# Patient Record
Sex: MALE | Race: BLACK OR AFRICAN AMERICAN | NOT HISPANIC OR LATINO
[De-identification: names, ages, dates, MRNs, and addresses within clinical notes are randomized per-mention and may not be internally consistent; named-entity substitution may affect disease eponyms.]

---

## 2018-07-12 ENCOUNTER — APPOINTMENT (OUTPATIENT)
Dept: NEPHROLOGY | Facility: CLINIC | Age: 83
End: 2018-07-12
Payer: MEDICARE

## 2018-07-12 VITALS
HEART RATE: 85 BPM | BODY MASS INDEX: 25.16 KG/M2 | DIASTOLIC BLOOD PRESSURE: 70 MMHG | HEIGHT: 68 IN | SYSTOLIC BLOOD PRESSURE: 138 MMHG | WEIGHT: 166 LBS | TEMPERATURE: 97.6 F | RESPIRATION RATE: 15 BRPM | OXYGEN SATURATION: 98 %

## 2018-07-12 DIAGNOSIS — Z86.79 PERSONAL HISTORY OF OTHER DISEASES OF THE CIRCULATORY SYSTEM: ICD-10-CM

## 2018-07-12 PROCEDURE — 99204 OFFICE O/P NEW MOD 45 MIN: CPT | Mod: GC

## 2018-07-12 RX ORDER — PROMETHAZINE HYDROCHLORIDE AND DEXTROMETHORPHAN HYDROBROMIDE ORAL SOLUTION 15; 6.25 MG/5ML; MG/5ML
6.25-15 SOLUTION ORAL
Qty: 118 | Refills: 0 | Status: DISCONTINUED | COMMUNITY
Start: 2018-06-09

## 2018-07-12 RX ORDER — PNV NO.95/FERROUS FUM/FOLIC AC 28MG-0.8MG
100 TABLET ORAL DAILY
Refills: 0 | Status: ACTIVE | COMMUNITY
Start: 2018-07-12

## 2018-07-19 ENCOUNTER — FORM ENCOUNTER (OUTPATIENT)
Age: 83
End: 2018-07-19

## 2018-07-19 ENCOUNTER — RX RENEWAL (OUTPATIENT)
Age: 83
End: 2018-07-19

## 2018-07-19 ENCOUNTER — RESULT REVIEW (OUTPATIENT)
Age: 83
End: 2018-07-19

## 2018-07-20 ENCOUNTER — OUTPATIENT (OUTPATIENT)
Dept: OUTPATIENT SERVICES | Facility: HOSPITAL | Age: 83
LOS: 1 days | End: 2018-07-20
Payer: MEDICARE

## 2018-07-20 ENCOUNTER — APPOINTMENT (OUTPATIENT)
Dept: ULTRASOUND IMAGING | Facility: IMAGING CENTER | Age: 83
End: 2018-07-20
Payer: MEDICARE

## 2018-07-20 DIAGNOSIS — Z00.8 ENCOUNTER FOR OTHER GENERAL EXAMINATION: ICD-10-CM

## 2018-07-20 LAB
ALBUMIN SERPL ELPH-MCNC: 3.6 G/DL
ANION GAP SERPL CALC-SCNC: 17 MMOL/L
APPEARANCE: CLEAR
BACTERIA: NEGATIVE
BILIRUBIN URINE: NEGATIVE
BLOOD URINE: ABNORMAL
BUN SERPL-MCNC: 21 MG/DL
CALCIUM SERPL-MCNC: 9.8 MG/DL
CHLORIDE SERPL-SCNC: 104 MMOL/L
CO2 SERPL-SCNC: 24 MMOL/L
COLOR: YELLOW
CREAT SERPL-MCNC: 1.43 MG/DL
CREAT SPEC-SCNC: 155 MG/DL
CREAT/PROT UR: 4.3 RATIO
GLUCOSE QUALITATIVE U: NEGATIVE MG/DL
GLUCOSE SERPL-MCNC: 153 MG/DL
HBV SURFACE AG SER QL: NONREACTIVE
HCV AB SER QL: NONREACTIVE
HCV S/CO RATIO: 0.07 S/CO
HYALINE CASTS: 0 /LPF
KETONES URINE: NEGATIVE
LEUKOCYTE ESTERASE URINE: NEGATIVE
M PROTEIN SPEC IFE-MCNC: NORMAL
MICROSCOPIC-UA: NORMAL
NITRITE URINE: NEGATIVE
PH URINE: 5.5
PHOSPHATE SERPL-MCNC: 3.2 MG/DL
PHOSPHOLIPASE A2 RECEPTOR ELISA: <2 RU/ML
PHOSPHOLIPASE A2 RECEPTOR IFA: NEGATIVE
POTASSIUM SERPL-SCNC: 4.7 MMOL/L
PROT UR-MCNC: 670 MG/DL
PROTEIN URINE: >300 MG/DL
RED BLOOD CELLS URINE: 15 /HPF
SODIUM SERPL-SCNC: 145 MMOL/L
SPECIFIC GRAVITY URINE: 1.02
SQUAMOUS EPITHELIAL CELLS: 2 /HPF
UROBILINOGEN URINE: NEGATIVE MG/DL
WHITE BLOOD CELLS URINE: 6 /HPF

## 2018-07-20 PROCEDURE — 93975 VASCULAR STUDY: CPT | Mod: 26

## 2018-07-20 PROCEDURE — 93975 VASCULAR STUDY: CPT

## 2019-04-18 ENCOUNTER — APPOINTMENT (OUTPATIENT)
Dept: NEPHROLOGY | Facility: CLINIC | Age: 84
End: 2019-04-18
Payer: MEDICARE

## 2019-04-18 VITALS
HEIGHT: 68 IN | WEIGHT: 165 LBS | TEMPERATURE: 97.9 F | HEART RATE: 89 BPM | SYSTOLIC BLOOD PRESSURE: 170 MMHG | DIASTOLIC BLOOD PRESSURE: 72 MMHG | RESPIRATION RATE: 16 BRPM | BODY MASS INDEX: 25.01 KG/M2

## 2019-04-18 VITALS — SYSTOLIC BLOOD PRESSURE: 160 MMHG | DIASTOLIC BLOOD PRESSURE: 75 MMHG

## 2019-04-18 PROCEDURE — 99214 OFFICE O/P EST MOD 30 MIN: CPT | Mod: GC

## 2019-04-18 RX ORDER — SIMVASTATIN 20 MG/1
20 TABLET, FILM COATED ORAL
Qty: 90 | Refills: 0 | Status: ACTIVE | COMMUNITY
Start: 2019-04-18

## 2019-04-18 NOTE — HISTORY OF PRESENT ILLNESS
[FreeTextEntry1] : 84-year-old male with long standing history of DM, HTN, CKD and proteinuria has come for follow-up visit after ~9 months. Pt. was seen for initial visit for CKD/proteinuria on 7/12/18. During initial visit on 7/12/18, pt found to have an elevated but stable Scr of 1.43 and elevated spot urine TP/CR of 4.3. Pt. also found to have IgA kappa band on serum CARLY. Pt. was subsequently advised to undergo hematology/oncology evaluation at [facility] however has not seen a hematologist/oncologist as advised.  \par \par Currently, pt. feels well but gives history of bilateral LE edema. Pt. denies SOB, CP, abdominal pain, nausea, vomiting or headache.

## 2019-04-18 NOTE — REVIEW OF SYSTEMS
[Lower Ext Edema] : lower extremity edema [As noted in HPI] : as noted in HPI [As Noted in HPI] : as noted in HPI [Limb Swelling] : limb swelling [Fever] : no fever [Chills] : no chills [Feeling Tired] : not feeling tired [Eyesight Problems] : no eyesight problems [Sore Throat] : no sore throat [Chest Pain] : no chest pain [Shortness Of Breath] : no shortness of breath [Vomiting] : no vomiting [Dysuria] : no dysuria [Joint Swelling] : no joint swelling [Limb Pain] : no limb pain [Skin Lesions] : no skin lesions [Confused] : no confusion [Dizziness] : no dizziness [Anxiety] : no anxiety [Depression] : no depression [Easy Bleeding] : no tendency for easy bleeding [Easy Bruising] : no tendency for easy bruising

## 2019-04-18 NOTE — PHYSICAL EXAM
[General Appearance - Alert] : alert [Outer Ear] : the ears and nose were normal in appearance [Sclera] : the sclera and conjunctiva were normal [Auscultation Breath Sounds / Voice Sounds] : lungs were clear to auscultation bilaterally [Jugular Venous Distention Increased] : there was no jugular-venous distention [Heart Sounds] : normal S1 and S2 [Abdomen Soft] : soft [No CVA Tenderness] : no ~M costovertebral angle tenderness [Skin Color & Pigmentation] : normal skin color and pigmentation [Abnormal Walk] : normal gait [Affect] : the affect was normal [Oriented To Time, Place, And Person] : oriented to person, place, and time [Mood] : the mood was normal [FreeTextEntry1] : Bilateral LE pitting edema present

## 2019-04-18 NOTE — ASSESSMENT
[FreeTextEntry1] : 1. CKD stage 3/Proteinuria: Pt. with CKD in setting of longstanding DM and HTN. On review of previous labs in [facility], Scr was 1.10 on 10/13/15 and 1.01 on 3/3/16. Spot urine microalbumin/creatinine was 72 in 10/13/15 which increased to 235 on 3/3/16. Scr increased to 1.58 on labs done in PMD's office on 5/2/18. Spot urine microalbumin/creatinine ratio was also elevated at 1909 on 5/2/18. Serum albumin was low at 3.4 on 5/2/18. Scr sent during initial office visit on 7/12/18 was elevated but stable at 1.43 and spot urine TP/CR was significantly elevated at 4.3. Serum albumin was WNL at 3.6 on 7/12/19. Serological workup including HBsAg, Hep C ab, and anti-PLA2R Ab were non-reactive/negative. Pt. however found to have IgA kappa band on serum CARLY. Pt. was subsequently advised to undergo hematology/oncology evaluation at [facility] however has not seen a hematologist/oncologist as advised. Renal ultrasound on 7/20/18 without evidence of renal masses, hydronephrosis, calculi, or renal artery stenosis. Check renal panel, spot urine TP/CR, CBC, and serum immunofixation today. Pt. advised to undergo hematology/oncology evaluation at [facility] soon. Phone number and address for hematology/oncology office at [facility]  provided to patient. Patient is currently on ACE-I. Importance of good glycemic and BP control discussed with patient. Patient advised to avoid NSAIDs, RCAs, and other nephrotoxins. Pt. currently on metformin. Will need to discontinue Metformin if eGFR decreases further. \par \par 2. HTN: BP elevated during office visit today. Advised patient to take torsemide 10 mg daily. Low salt diet advised. Monitor BP.\par \par 3. LE edema: Patient noted to have bilateral LE edema on examination today. Advised to start torsemide as above. Low salt diet advised. Monitor body weight. \par \par RTC: 1 month

## 2019-04-24 ENCOUNTER — APPOINTMENT (OUTPATIENT)
Dept: NEPHROLOGY | Facility: CLINIC | Age: 84
End: 2019-04-24
Payer: MEDICARE

## 2019-04-24 PROCEDURE — 36415 COLL VENOUS BLD VENIPUNCTURE: CPT

## 2019-04-25 ENCOUNTER — RESULT REVIEW (OUTPATIENT)
Age: 84
End: 2019-04-25

## 2019-04-25 LAB
ALBUMIN SERPL ELPH-MCNC: 3.5 G/DL
ANION GAP SERPL CALC-SCNC: 12 MMOL/L
BASOPHILS # BLD AUTO: 0.02 K/UL
BASOPHILS NFR BLD AUTO: 0.4 %
BUN SERPL-MCNC: 39 MG/DL
CALCIUM SERPL-MCNC: 9.4 MG/DL
CHLORIDE SERPL-SCNC: 106 MMOL/L
CO2 SERPL-SCNC: 25 MMOL/L
CREAT SERPL-MCNC: 2.68 MG/DL
CREAT SPEC-SCNC: 145 MG/DL
CREAT/PROT UR: 4 RATIO
EOSINOPHIL # BLD AUTO: 0.28 K/UL
EOSINOPHIL NFR BLD AUTO: 5.2 %
GLUCOSE SERPL-MCNC: 110 MG/DL
HCT VFR BLD CALC: 33.6 %
HGB BLD-MCNC: 10.3 G/DL
IMM GRANULOCYTES NFR BLD AUTO: 0.2 %
LYMPHOCYTES # BLD AUTO: 1.68 K/UL
LYMPHOCYTES NFR BLD AUTO: 31.5 %
M PROTEIN SPEC IFE-MCNC: NORMAL
MAN DIFF?: NORMAL
MCHC RBC-ENTMCNC: 27.5 PG
MCHC RBC-ENTMCNC: 30.7 GM/DL
MCV RBC AUTO: 89.8 FL
MONOCYTES # BLD AUTO: 0.47 K/UL
MONOCYTES NFR BLD AUTO: 8.8 %
NEUTROPHILS # BLD AUTO: 2.88 K/UL
NEUTROPHILS NFR BLD AUTO: 53.9 %
PHOSPHATE SERPL-MCNC: 4 MG/DL
PLATELET # BLD AUTO: 264 K/UL
POTASSIUM SERPL-SCNC: 5.7 MMOL/L
PROT UR-MCNC: 574 MG/DL
RBC # BLD: 3.74 M/UL
RBC # FLD: 13.5 %
SODIUM SERPL-SCNC: 143 MMOL/L
WBC # FLD AUTO: 5.34 K/UL

## 2019-04-26 LAB
ALBUMIN SERPL ELPH-MCNC: 3.6 G/DL
ANION GAP SERPL CALC-SCNC: 12 MMOL/L
BUN SERPL-MCNC: 42 MG/DL
CALCIUM SERPL-MCNC: 9.8 MG/DL
CHLORIDE SERPL-SCNC: 100 MMOL/L
CO2 SERPL-SCNC: 27 MMOL/L
CREAT SERPL-MCNC: 2.6 MG/DL
GLUCOSE SERPL-MCNC: 137 MG/DL
PHOSPHATE SERPL-MCNC: 4.9 MG/DL
POTASSIUM SERPL-SCNC: 5.1 MMOL/L
SODIUM SERPL-SCNC: 139 MMOL/L

## 2019-05-08 ENCOUNTER — OUTPATIENT (OUTPATIENT)
Dept: OUTPATIENT SERVICES | Facility: HOSPITAL | Age: 84
LOS: 1 days | Discharge: HOME | End: 2019-05-08

## 2019-05-08 DIAGNOSIS — D64.9 ANEMIA, UNSPECIFIED: ICD-10-CM

## 2019-05-09 ENCOUNTER — APPOINTMENT (OUTPATIENT)
Dept: HEMATOLOGY ONCOLOGY | Facility: CLINIC | Age: 84
End: 2019-05-09
Payer: MEDICARE

## 2019-05-09 VITALS
RESPIRATION RATE: 16 BRPM | SYSTOLIC BLOOD PRESSURE: 172 MMHG | TEMPERATURE: 97.6 F | HEIGHT: 67.8 IN | BODY MASS INDEX: 24.27 KG/M2 | OXYGEN SATURATION: 99 % | HEART RATE: 90 BPM | WEIGHT: 158.29 LBS | DIASTOLIC BLOOD PRESSURE: 76 MMHG

## 2019-05-09 DIAGNOSIS — Z78.9 OTHER SPECIFIED HEALTH STATUS: ICD-10-CM

## 2019-05-09 PROCEDURE — 99205 OFFICE O/P NEW HI 60 MIN: CPT

## 2019-05-09 RX ORDER — SITAGLIPTIN 100 MG/1
100 TABLET, FILM COATED ORAL DAILY
Qty: 30 | Refills: 0 | Status: ACTIVE | COMMUNITY
Start: 2019-05-09

## 2019-05-09 RX ORDER — METFORMIN HYDROCHLORIDE 1000 MG/1
1000 TABLET, EXTENDED RELEASE ORAL
Qty: 60 | Refills: 0 | Status: DISCONTINUED | COMMUNITY
Start: 2017-12-01 | End: 2019-05-09

## 2019-05-09 NOTE — PHYSICAL EXAM
[Fully active, able to carry on all pre-disease performance without restriction] : Status 0 - Fully active, able to carry on all pre-disease performance without restriction [Normal] : affect appropriate [de-identified] : upper and lower dentures; no oral lesions

## 2019-05-09 NOTE — HISTORY OF PRESENT ILLNESS
[Constitutional] : Constitutional [Cardiovascular] : Cardiovascular [Dermatologic] : Dermatologic [ENT] : ENT [Endocrine] : Endocrine [Gastrointestinal] : Gastrointestinal [Genitourinary] : Genitourinary [Gynecologic] : Gynecologic [Infectious] : Infectious [Musculoskeletal] : Musculoskeletal [Pain] : Pain [Neurologic] : Neurologic [Hematologic] : Hematologic [Pulmonary] : Pulmonary [Date: ____________] : Patient's last distress assessment performed on [unfilled]. [0 - No Distress] : Distress Level: 0 [de-identified] : Kris Ponce is a 84 year old male presenting to the office for hematologic evaluation. He is referred here by Dr. Jorje Abreu (nephrologist).\par The patient has a history of hypertension for 25 years. He has been taking medication for control og blood pressure. There is no history of myocardial infarction stroke or peripheral vascular disease\par The patient has a history of diabetes for 25 years. He has been treated for diabetes with metformin.\par The patietn was noted to have IgA kappa light chain elevation in 2018\par  Patient completed lab studies completed between 4/18/2019 - 4/25/2019 and the following results were noted: Creatinine 1.43, elevated spot urine T P/CR 4.3, IgA kappa band on serum I F E.\par Past medical history includes DM, HTN, CKD, proteinuria, bilateral lower extremity edema. \par \par  [FreeTextEntry1] : initial evaluation [de-identified] : Currently, pt. feels well but gives history of bilateral LE edema. Pt. denies SOB, CP, abdominal pain, nausea, vomiting or headache.  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 The patient feels entirely well. He has occasional joint pains. there is no history of spontaneous fracture

## 2019-05-09 NOTE — CONSULT LETTER
[Consult Letter:] : I had the pleasure of evaluating your patient, [unfilled]. [Dear  ___] : Dear  [unfilled], [Sincerely,] : Sincerely, [FreeTextEntry2] : Ventura Abreu MD\par Nephology\par [address] [place] [address]\par [facility] [id number] [FreeTextEntry3] : Tom Palencia

## 2019-05-09 NOTE — REVIEW OF SYSTEMS
[Skin Rash] : skin rash [Negative] : Allergic/Immunologic [Joint Stiffness] : no joint stiffness [Joint Pain] : no joint pain [FreeTextEntry2] : 8 lbs [Muscle Pain] : no muscle pain [de-identified] : tinea

## 2019-05-09 NOTE — ASSESSMENT
[Supportive] : Goals of care discussed with patient: Supportive [Palliative Care Plan] : not applicable at this time [FreeTextEntry1] : Mr cathy Ponce is a well appearing 84 year old male who continues to work (as a ); he is interactive and he has not required dialysis despite moderate renal insufficiency. 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 It is not likely to be contributory to his mild renal insufficiency as he has prior diabetes and hypertension. The mono clonal gammopathy has been stable; it is of the Ig A variety and I would advise continued characterization and monitoring. There is no history of skeletal events.\par I shall request serum immunoelectrophoresis, CMP, free serum light chains, and measurement of 24 hour light chain production. THe clinical findings are not compatible with light chain disease or multiple myeloma. Urinary and serum levels to be obtained on May 13; requisition mailed to patient. RTC 2 weeks. Patient seen with SAI CLANCY

## 2019-05-13 ENCOUNTER — RESULT REVIEW (OUTPATIENT)
Age: 84
End: 2019-05-13

## 2019-05-13 ENCOUNTER — APPOINTMENT (OUTPATIENT)
Dept: HEMATOLOGY ONCOLOGY | Facility: CLINIC | Age: 84
End: 2019-05-13

## 2019-05-13 LAB
ALBUMIN SERPL ELPH-MCNC: 3.7 G/DL
ALP BLD-CCNC: 95 U/L
ALT SERPL-CCNC: 12 U/L
ANION GAP SERPL CALC-SCNC: 13 MMOL/L
AST SERPL-CCNC: 19 U/L
BASOPHILS # BLD AUTO: 0 K/UL — SIGNIFICANT CHANGE UP (ref 0–0.2)
BASOPHILS NFR BLD AUTO: 0.4 % — SIGNIFICANT CHANGE UP (ref 0–2)
BILIRUB SERPL-MCNC: 0.2 MG/DL
BUN SERPL-MCNC: 31 MG/DL
CALCIUM SERPL-MCNC: 9.4 MG/DL
CHLORIDE SERPL-SCNC: 105 MMOL/L
CO2 SERPL-SCNC: 24 MMOL/L
CREAT SERPL-MCNC: 2.35 MG/DL
EOSINOPHIL # BLD AUTO: 0.2 K/UL — SIGNIFICANT CHANGE UP (ref 0–0.5)
EOSINOPHIL NFR BLD AUTO: 3.7 % — SIGNIFICANT CHANGE UP (ref 0–6)
GLUCOSE SERPL-MCNC: 156 MG/DL
HCT VFR BLD CALC: 32.7 % — LOW (ref 39–50)
HGB BLD-MCNC: 11.5 G/DL — LOW (ref 13–17)
LYMPHOCYTES # BLD AUTO: 2 K/UL — SIGNIFICANT CHANGE UP (ref 1–3.3)
LYMPHOCYTES # BLD AUTO: 37.9 % — SIGNIFICANT CHANGE UP (ref 13–44)
MCHC RBC-ENTMCNC: 30.1 PG — SIGNIFICANT CHANGE UP (ref 27–34)
MCHC RBC-ENTMCNC: 35.1 G/DL — SIGNIFICANT CHANGE UP (ref 32–36)
MCV RBC AUTO: 85.7 FL — SIGNIFICANT CHANGE UP (ref 80–100)
MONOCYTES # BLD AUTO: 0.4 K/UL — SIGNIFICANT CHANGE UP (ref 0–0.9)
MONOCYTES NFR BLD AUTO: 8 % — SIGNIFICANT CHANGE UP (ref 2–14)
NEUTROPHILS # BLD AUTO: 2.7 K/UL — SIGNIFICANT CHANGE UP (ref 1.8–7.4)
NEUTROPHILS NFR BLD AUTO: 50 % — SIGNIFICANT CHANGE UP (ref 43–77)
PLATELET # BLD AUTO: 245 K/UL — SIGNIFICANT CHANGE UP (ref 150–400)
POTASSIUM SERPL-SCNC: 4.8 MMOL/L
PROT SERPL-MCNC: 6.8 G/DL
RBC # BLD: 3.81 M/UL — LOW (ref 4.2–5.8)
RBC # FLD: 12.3 % — SIGNIFICANT CHANGE UP (ref 10.3–14.5)
SODIUM SERPL-SCNC: 142 MMOL/L
WBC # BLD: 5.4 K/UL — SIGNIFICANT CHANGE UP (ref 3.8–10.5)
WBC # FLD AUTO: 5.4 K/UL — SIGNIFICANT CHANGE UP (ref 3.8–10.5)

## 2019-05-14 LAB
DEPRECATED KAPPA LC FREE/LAMBDA SER: 2.3 RATIO
KAPPA LC CSF-MCNC: 3.41 MG/DL
KAPPA LC SERPL-MCNC: 7.84 MG/DL

## 2019-05-15 LAB
ALBUMIN MFR SERPL ELPH: 49.9 %
ALBUMIN SERPL-MCNC: 3.4 G/DL
ALBUMIN/GLOB SERPL: 1 RATIO
ALPHA1 GLOB MFR SERPL ELPH: 4.8 %
ALPHA1 GLOB SERPL ELPH-MCNC: 0.3 G/DL
ALPHA2 GLOB MFR SERPL ELPH: 17.1 %
ALPHA2 GLOB SERPL ELPH-MCNC: 1.2 G/DL
B-GLOBULIN MFR SERPL ELPH: 20.1 %
B-GLOBULIN SERPL ELPH-MCNC: 1.4 G/DL
DEPRECATED KAPPA LC FREE/LAMBDA SER: 2.3 RATIO
GAMMA GLOB FLD ELPH-MCNC: 0.6 G/DL
GAMMA GLOB MFR SERPL ELPH: 8.1 %
IGA SER QL IEP: 701 MG/DL
IGG SER QL IEP: 534 MG/DL
IGM SER QL IEP: 20 MG/DL
INTERPRETATION SERPL IEP-IMP: NORMAL
KAPPA LC CSF-MCNC: 3.41 MG/DL
KAPPA LC SERPL-MCNC: 7.84 MG/DL
M PROTEIN MFR SERPL ELPH: 4.8 %
M PROTEIN SPEC IFE-MCNC: NORMAL
MONOCLON BAND OBS SERPL: 0.3 G/DL
PROT SERPL-MCNC: 6.8 G/DL
PROT SERPL-MCNC: 6.8 G/DL

## 2019-05-16 ENCOUNTER — APPOINTMENT (OUTPATIENT)
Dept: NEPHROLOGY | Facility: CLINIC | Age: 84
End: 2019-05-16
Payer: MEDICARE

## 2019-05-16 VITALS — SYSTOLIC BLOOD PRESSURE: 160 MMHG | DIASTOLIC BLOOD PRESSURE: 80 MMHG

## 2019-05-16 VITALS
DIASTOLIC BLOOD PRESSURE: 72 MMHG | TEMPERATURE: 97.7 F | BODY MASS INDEX: 24.7 KG/M2 | OXYGEN SATURATION: 97 % | SYSTOLIC BLOOD PRESSURE: 163 MMHG | HEART RATE: 90 BPM | RESPIRATION RATE: 15 BRPM | WEIGHT: 161.5 LBS

## 2019-05-16 DIAGNOSIS — R80.9 PROTEINURIA, UNSPECIFIED: ICD-10-CM

## 2019-05-16 LAB
KAPPA LC 24H UR-MCNC: 15.9 MG/DL
KAPPA LC 24H UR-MRATE: 190.8 MG/24 H
LAMBDA LC 24H UR-MCNC: 5.16 MG/DL
LAMBDA LC 24H UR-MRATE: 61.92 MG/24 H
SPECIMEN VOL 24H UR: 1200 ML/24 H

## 2019-05-16 PROCEDURE — 99214 OFFICE O/P EST MOD 30 MIN: CPT

## 2019-05-16 NOTE — ASSESSMENT
[FreeTextEntry1] : 1. CKD stage 3/Proteinuria: Pt. with CKD and proteinuria in setting of longstanding DM and HTN. Serological workup (sent in the past) including HBsAg, Hep C ab, and anti-PLA2R Ab were non-reactive/negative. Pt. however found to have IgA kappa band on serum CARLY. Pt. saw hematologist/oncologist Dr. Palencia at [facility] on 5/9/19 for evaluation. Dr. Conroy's note reviewed. Pt. has a follow-up appointment with Dr. Palencia on 5/22/19. Renal ultrasound on 7/20/18 without evidence of renal masses, hydronephrosis, calculi, or renal artery stenosis. Recent Scr elevated/stable at 2.35 on 5/13/19. Patient is currently on ACE-I. Importance of good glycemic and BP control discussed with patient. Patient advised to avoid NSAIDs, RCAs, and other nephrotoxins. Monitor renal function.\par \par 2. HTN: Pt. with elevated BP readings during office visit today. Increased torsemide to 20 mg daily for LE edema and HTN management. Low salt diet advised. Monitor BP.\par \par 3. LE edema: Patient with bilateral LE edema on examination today. Dose of torsemide to be increased (see above). Low salt diet advised. Monitor body weight. \par \par RTC: 1 month

## 2019-05-16 NOTE — PHYSICAL EXAM
[Sclera] : the sclera and conjunctiva were normal [General Appearance - Alert] : alert [Outer Ear] : the ears and nose were normal in appearance [Jugular Venous Distention Increased] : there was no jugular-venous distention [Auscultation Breath Sounds / Voice Sounds] : lungs were clear to auscultation bilaterally [Heart Sounds] : normal S1 and S2 [Abdomen Soft] : soft [No CVA Tenderness] : no ~M costovertebral angle tenderness [Abnormal Walk] : normal gait [Skin Color & Pigmentation] : normal skin color and pigmentation [Affect] : the affect was normal [Oriented To Time, Place, And Person] : oriented to person, place, and time [Mood] : the mood was normal [FreeTextEntry1] : Bilateral LE pitting edema present

## 2019-05-16 NOTE — REVIEW OF SYSTEMS
[As noted in HPI] : as noted in HPI [Lower Ext Edema] : lower extremity edema [As Noted in HPI] : as noted in HPI [Limb Swelling] : limb swelling [Fever] : no fever [Chills] : no chills [Eyesight Problems] : no eyesight problems [Feeling Tired] : not feeling tired [Sore Throat] : no sore throat [Chest Pain] : no chest pain [Vomiting] : no vomiting [Shortness Of Breath] : no shortness of breath [Dysuria] : no dysuria [Joint Swelling] : no joint swelling [Limb Pain] : no limb pain [Skin Lesions] : no skin lesions [Confused] : no confusion [Dizziness] : no dizziness [Anxiety] : no anxiety [Depression] : no depression [Easy Bleeding] : no tendency for easy bleeding [Easy Bruising] : no tendency for easy bruising

## 2019-05-16 NOTE — HISTORY OF PRESENT ILLNESS
[FreeTextEntry1] : 84-year-old male with long standing history of DM, HTN, CKD and proteinuria has come for follow-up visit. Pt. was last seen in clinic on 4/18/19. Pt. saw hematologist/oncologist Dr. Palencia at [facility] on 5/9/19 for evaluation of IgA kappa band that was seen on serum CARLY. Pt. says he has a follow-up appointment with Dr. Palencia on 5/22/19. \par \par Currently, pt. feels well. Pt. says his bilateral LE edema decreased after initiating oral diuretic therapy. Pt. denies SOB, CP, abdominal pain, nausea, vomiting or headache.

## 2019-05-22 ENCOUNTER — APPOINTMENT (OUTPATIENT)
Dept: HEMATOLOGY ONCOLOGY | Facility: CLINIC | Age: 84
End: 2019-05-22
Payer: MEDICARE

## 2019-05-22 VITALS
BODY MASS INDEX: 25.15 KG/M2 | DIASTOLIC BLOOD PRESSURE: 67 MMHG | OXYGEN SATURATION: 99 % | SYSTOLIC BLOOD PRESSURE: 156 MMHG | WEIGHT: 164.46 LBS | TEMPERATURE: 97.4 F | RESPIRATION RATE: 16 BRPM | HEART RATE: 82 BPM

## 2019-05-22 DIAGNOSIS — D47.2 MONOCLONAL GAMMOPATHY: ICD-10-CM

## 2019-05-22 DIAGNOSIS — Z86.39 PERSONAL HISTORY OF OTHER ENDOCRINE, NUTRITIONAL AND METABOLIC DISEASE: ICD-10-CM

## 2019-05-22 PROCEDURE — 99214 OFFICE O/P EST MOD 30 MIN: CPT

## 2019-05-22 NOTE — PHYSICAL EXAM
[Restricted in physically strenuous activity but ambulatory and able to carry out work of a light or sedentary nature] : Status 1- Restricted in physically strenuous activity but ambulatory and able to carry out work of a light or sedentary nature, e.g., light house work, office work [Normal] : affect appropriate [de-identified] : upper and lower dentures; no oral lesions

## 2019-05-22 NOTE — ASSESSMENT
[Supportive] : Goals of care discussed with patient: Supportive [Palliative Care Plan] : not applicable at this time [FreeTextEntry1] : I think that the major reason for creatine elevation is long standing hypertension and diabetes. he does have monoclonal gammopathy but the kappa and lambda levels are elevated; the degree of elevation is not to the extent typically seen in light chain disease.\par History and physical examination reviews and no new findings are noted\par Findings are not compatible with diagnosis of multiple myeloma\par Please continue treatment of hypertension and diabetes.\par RTC in 6 months

## 2019-05-22 NOTE — RESULTS/DATA
[FreeTextEntry1] : review of the immunoelectrophoresis: lower IgG and IgM; elevation IgA to 701; elevation of both free light kappa 7.84  and lambda light chains 3.41; ratio is minimally elevated at 3.3. M spike 0.3;\par He has excretion of both lambda and kappa free light in his 24 hour total urine

## 2019-05-22 NOTE — HISTORY OF PRESENT ILLNESS
[de-identified] : Kris Ponce is a 84 year old male presenting to the office for hematologic evaluation. He is referred here by Dr. Jorje Abreu (nephrologist).\par The patient has a history of hypertension for 25 years. He has been taking medication for control og blood pressure. There is no history of myocardial infarction stroke or peripheral vascular disease\par The patient has a history of diabetes for 25 years. He has been treated for diabetes with metformin.\par The patietn was noted to have IgA kappa light chain elevation in 2018\par  Patient completed lab studies completed between 4/18/2019 - 4/25/2019 and the following results were noted: Creatinine 1.43, elevated spot urine T P/CR 4.3, IgA kappa band on serum I F E.\par Past medical history includes DM, HTN, CKD, proteinuria, bilateral lower extremity edema. \par \par  [FreeTextEntry1] : observation

## 2019-05-22 NOTE — REASON FOR VISIT
[Follow-Up Visit] : a follow-up visit for [Blood Count Assessment] : blood count assessment [FreeTextEntry2] : I am here for the elevated plasma protein

## 2019-08-30 ENCOUNTER — APPOINTMENT (OUTPATIENT)
Dept: NEPHROLOGY | Facility: CLINIC | Age: 84
End: 2019-08-30

## 2019-09-06 ENCOUNTER — RX RENEWAL (OUTPATIENT)
Age: 84
End: 2019-09-06

## 2019-09-08 ENCOUNTER — RX RENEWAL (OUTPATIENT)
Age: 84
End: 2019-09-08

## 2019-09-19 ENCOUNTER — RX RENEWAL (OUTPATIENT)
Age: 84
End: 2019-09-19

## 2019-09-19 ENCOUNTER — EMERGENCY (EMERGENCY)
Facility: HOSPITAL | Age: 84
LOS: 1 days | Discharge: HOME | End: 2019-09-19
Attending: EMERGENCY MEDICINE | Admitting: EMERGENCY MEDICINE
Payer: MEDICARE

## 2019-09-19 ENCOUNTER — APPOINTMENT (OUTPATIENT)
Dept: NEPHROLOGY | Facility: CLINIC | Age: 84
End: 2019-09-19
Payer: MEDICARE

## 2019-09-19 ENCOUNTER — RESULT REVIEW (OUTPATIENT)
Age: 84
End: 2019-09-19

## 2019-09-19 VITALS
SYSTOLIC BLOOD PRESSURE: 166 MMHG | TEMPERATURE: 98 F | DIASTOLIC BLOOD PRESSURE: 71 MMHG | RESPIRATION RATE: 16 BRPM | HEART RATE: 82 BPM | OXYGEN SATURATION: 100 %

## 2019-09-19 VITALS
SYSTOLIC BLOOD PRESSURE: 175 MMHG | HEIGHT: 67.8 IN | RESPIRATION RATE: 16 BRPM | BODY MASS INDEX: 24.07 KG/M2 | OXYGEN SATURATION: 99 % | TEMPERATURE: 98.4 F | WEIGHT: 157 LBS | HEART RATE: 68 BPM | DIASTOLIC BLOOD PRESSURE: 74 MMHG

## 2019-09-19 VITALS
DIASTOLIC BLOOD PRESSURE: 75 MMHG | SYSTOLIC BLOOD PRESSURE: 163 MMHG | TEMPERATURE: 98 F | HEART RATE: 79 BPM | RESPIRATION RATE: 16 BRPM | OXYGEN SATURATION: 100 %

## 2019-09-19 VITALS — SYSTOLIC BLOOD PRESSURE: 170 MMHG | DIASTOLIC BLOOD PRESSURE: 85 MMHG

## 2019-09-19 DIAGNOSIS — N18.9 CHRONIC KIDNEY DISEASE, UNSPECIFIED: ICD-10-CM

## 2019-09-19 LAB
ALBUMIN SERPL ELPH-MCNC: 3.4 G/DL — SIGNIFICANT CHANGE UP (ref 3.3–5)
ALP SERPL-CCNC: 81 U/L — SIGNIFICANT CHANGE UP (ref 40–120)
ALT FLD-CCNC: 23 U/L — SIGNIFICANT CHANGE UP (ref 4–41)
ANION GAP SERPL CALC-SCNC: 12 MMO/L — SIGNIFICANT CHANGE UP (ref 7–14)
AST SERPL-CCNC: 39 U/L — SIGNIFICANT CHANGE UP (ref 4–40)
BASOPHILS # BLD AUTO: 0.02 K/UL — SIGNIFICANT CHANGE UP (ref 0–0.2)
BASOPHILS NFR BLD AUTO: 0.3 % — SIGNIFICANT CHANGE UP (ref 0–2)
BILIRUB SERPL-MCNC: 0.3 MG/DL — SIGNIFICANT CHANGE UP (ref 0.2–1.2)
BUN SERPL-MCNC: 42 MG/DL — HIGH (ref 7–23)
CALCIUM SERPL-MCNC: 8.7 MG/DL — SIGNIFICANT CHANGE UP (ref 8.4–10.5)
CHLORIDE SERPL-SCNC: 105 MMOL/L — SIGNIFICANT CHANGE UP (ref 98–107)
CO2 SERPL-SCNC: 22 MMOL/L — SIGNIFICANT CHANGE UP (ref 22–31)
CREAT SERPL-MCNC: 3.26 MG/DL — HIGH (ref 0.5–1.3)
EOSINOPHIL # BLD AUTO: 0.18 K/UL — SIGNIFICANT CHANGE UP (ref 0–0.5)
EOSINOPHIL NFR BLD AUTO: 2.8 % — SIGNIFICANT CHANGE UP (ref 0–6)
GLUCOSE SERPL-MCNC: 117 MG/DL — HIGH (ref 70–99)
HCT VFR BLD CALC: 34.2 % — LOW (ref 39–50)
HGB BLD-MCNC: 10.7 G/DL — LOW (ref 13–17)
IMM GRANULOCYTES NFR BLD AUTO: 0.2 % — SIGNIFICANT CHANGE UP (ref 0–1.5)
LYMPHOCYTES # BLD AUTO: 1.91 K/UL — SIGNIFICANT CHANGE UP (ref 1–3.3)
LYMPHOCYTES # BLD AUTO: 29.9 % — SIGNIFICANT CHANGE UP (ref 13–44)
MCHC RBC-ENTMCNC: 26.4 PG — LOW (ref 27–34)
MCHC RBC-ENTMCNC: 31.3 % — LOW (ref 32–36)
MCV RBC AUTO: 84.4 FL — SIGNIFICANT CHANGE UP (ref 80–100)
MONOCYTES # BLD AUTO: 0.55 K/UL — SIGNIFICANT CHANGE UP (ref 0–0.9)
MONOCYTES NFR BLD AUTO: 8.6 % — SIGNIFICANT CHANGE UP (ref 2–14)
NEUTROPHILS # BLD AUTO: 3.72 K/UL — SIGNIFICANT CHANGE UP (ref 1.8–7.4)
NEUTROPHILS NFR BLD AUTO: 58.2 % — SIGNIFICANT CHANGE UP (ref 43–77)
NRBC # FLD: 0 K/UL — SIGNIFICANT CHANGE UP (ref 0–0)
PLATELET # BLD AUTO: 232 K/UL — SIGNIFICANT CHANGE UP (ref 150–400)
PMV BLD: 10.3 FL — SIGNIFICANT CHANGE UP (ref 7–13)
POTASSIUM SERPL-MCNC: SIGNIFICANT CHANGE UP MMOL/L (ref 3.5–5.3)
POTASSIUM SERPL-SCNC: SIGNIFICANT CHANGE UP MMOL/L (ref 3.5–5.3)
PROT SERPL-MCNC: 7.2 G/DL — SIGNIFICANT CHANGE UP (ref 6–8.3)
RBC # BLD: 4.05 M/UL — LOW (ref 4.2–5.8)
RBC # FLD: 13.2 % — SIGNIFICANT CHANGE UP (ref 10.3–14.5)
SODIUM SERPL-SCNC: 139 MMOL/L — SIGNIFICANT CHANGE UP (ref 135–145)
WBC # BLD: 6.39 K/UL — SIGNIFICANT CHANGE UP (ref 3.8–10.5)
WBC # FLD AUTO: 6.39 K/UL — SIGNIFICANT CHANGE UP (ref 3.8–10.5)

## 2019-09-19 PROCEDURE — 99284 EMERGENCY DEPT VISIT MOD MDM: CPT | Mod: GC

## 2019-09-19 PROCEDURE — 99214 OFFICE O/P EST MOD 30 MIN: CPT

## 2019-09-19 RX ORDER — CALCIUM GLUCONATE 100 MG/ML
1 VIAL (ML) INTRAVENOUS ONCE
Refills: 0 | Status: COMPLETED | OUTPATIENT
Start: 2019-09-19 | End: 2019-09-19

## 2019-09-19 RX ADMIN — Medication 200 GRAM(S): at 23:57

## 2019-09-19 NOTE — ED ADULT NURSE NOTE - OBJECTIVE STATEMENT
pt received to room 18, AAOx3, sent by PMD for abnormal lab work, Potassium was elevated at PMD office. PMH- HTN, DM, CKD. 20G IV placed to rt hand, labs drawn and sent, VS as noted, pt in NAD, family at bedside, will continue to monitor pt.

## 2019-09-19 NOTE — ED PROVIDER NOTE - NSFOLLOWUPINSTRUCTIONS_ED_ALL_ED_FT
- Follow up with your nephrologist this week. Please maintain a low potassium diet (ie avoid greens, potatoes, beans, and tomatoes)    - Lab and imaging results, if performed, were discussed with you along with your discharge diagnosis    - Follow up with your doctor in 1 week - bring copies of your results if you were given. If you do not have a primary doctor, please call [phone] to find one convenient for you    - Return to the ED for any new, worsening, or concerning symptoms to you    - Continue all prescribed medications    - Rest and keep yourself hydrated with fluids

## 2019-09-19 NOTE — ED PROVIDER NOTE - ATTENDING CONTRIBUTION TO CARE
84M h/o CKD sent to ED when outpt labs showed hyperK and rising creatinine. Pt feel well, asymptomatic, EKG w/o e/o hyperK, exam unremarkable. WIll recheck BMP here and reassess.

## 2019-09-19 NOTE — REVIEW OF SYSTEMS
[As noted in HPI] : as noted in HPI [Lower Ext Edema] : lower extremity edema [As Noted in HPI] : as noted in HPI [Limb Swelling] : limb swelling [Fever] : no fever [Chills] : no chills [Feeling Tired] : not feeling tired [Eyesight Problems] : no eyesight problems [Sore Throat] : no sore throat [Chest Pain] : no chest pain [Shortness Of Breath] : no shortness of breath [Vomiting] : no vomiting [Dysuria] : no dysuria [Joint Swelling] : no joint swelling [Limb Pain] : no limb pain [Skin Lesions] : no skin lesions [Confused] : no confusion [Dizziness] : no dizziness [Anxiety] : no anxiety [Depression] : no depression [Easy Bleeding] : no tendency for easy bleeding [Easy Bruising] : no tendency for easy bruising

## 2019-09-19 NOTE — ED ADULT TRIAGE NOTE - CHIEF COMPLAINT QUOTE
Pt was seen by nephrologist today as a follow up appointment for his kidneys. Pt had bloodwork drawn today. Was called by MD to come to ED for abnormal labs. Pt unsure what labs are abnormal . Pt states has had unintentional weight loss in about 6 months.

## 2019-09-19 NOTE — ED PROVIDER NOTE - OBJECTIVE STATEMENT
83yo M hx htn dm CKD p/w routine bloodwork showing hyper K and worsening Cr. Asymptomatic currently. 83yo M hx htn dm CKD p/w routine bloodwork showing hyper K and worsening Cr today by his nephrologist. Asymptomatic currently. Denies recent illness, fever, chills, cp, sob, palpitations, n/v/d, decreased PO, or abd pain. No recent change to meds.

## 2019-09-19 NOTE — PHYSICAL EXAM
[General Appearance - Alert] : alert [Sclera] : the sclera and conjunctiva were normal [Outer Ear] : the ears and nose were normal in appearance [Jugular Venous Distention Increased] : there was no jugular-venous distention [Auscultation Breath Sounds / Voice Sounds] : lungs were clear to auscultation bilaterally [Heart Sounds] : normal S1 and S2 [Abdomen Soft] : soft [No CVA Tenderness] : no ~M costovertebral angle tenderness [Abnormal Walk] : normal gait [Skin Color & Pigmentation] : normal skin color and pigmentation [Oriented To Time, Place, And Person] : oriented to person, place, and time [Affect] : the affect was normal [Mood] : the mood was normal [FreeTextEntry1] : Bilateral LE pitting edema present

## 2019-09-19 NOTE — ASSESSMENT
[FreeTextEntry1] : 1. CKD stage 3/Proteinuria: Pt. with CKD and proteinuria in setting of longstanding DM and HTN. Serological workup (sent in the past) including HBsAg, Hep C ab, and anti-PLA2R Ab were non-reactive/negative. Pt. however found to have IgA kappa band on serum CARLY. Pt. saw hematologist/oncologist Dr. Palencia at [facility] in May 2019. Dr. Palencia's notes reviewed. Renal ultrasound on 7/20/18 without evidence of renal masses, hydronephrosis, calculi, or renal artery stenosis. Recent Scr elevated/stable at 2.35 on 5/13/19. Patient is currently on ACE-I. Importance of good glycemic and BP control discussed with patient. Patient advised to avoid NSAIDs, RCAs, and other nephrotoxins. Check renal panel today. Monitor renal function.\par \par 2. HTN: Pt. with elevated BP readings during office visit today. Increased torsemide to 40 mg daily for LE edema and HTN management. Low salt diet advised. Monitor BP.\par \par 3. LE edema: Patient with bilateral LE edema on examination today. Dose of torsemide to be increased (see above). Low salt diet advised. Monitor body weight. \par \par Pt. to see his PMD next month. RTC: 2 month

## 2019-09-19 NOTE — ED PROVIDER NOTE - PATIENT PORTAL LINK FT
You can access the FollowMyHealth Patient Portal offered by [facility] by registering at the following website: [email or web address]. By joining [employer]’s FollowMyHealth portal, you will also be able to view your health information using other applications (apps) compatible with our system.

## 2019-09-19 NOTE — ED PROVIDER NOTE - PROGRESS NOTE DETAILS
[facility] DO PGY2 - spoke to nephrology fellow [place] - given no medications given or fluids to decrease potassium, and potassium spontaneously normalized. It is felt that pt is safe for f/u outpt with his nephrologist - fellow will expedite follow up. Calcium was given prophylactically until labs were resulted at the time.

## 2019-09-20 ENCOUNTER — RESULT REVIEW (OUTPATIENT)
Age: 84
End: 2019-09-20

## 2019-09-20 LAB
ALBUMIN SERPL ELPH-MCNC: 3.5 G/DL
ANION GAP SERPL CALC-SCNC: 11 MMOL/L
ANION GAP SERPL CALC-SCNC: 9 MMO/L — SIGNIFICANT CHANGE UP (ref 7–14)
BUN SERPL-MCNC: 41 MG/DL
BUN SERPL-MCNC: 43 MG/DL — HIGH (ref 7–23)
CALCIUM SERPL-MCNC: 8.9 MG/DL — SIGNIFICANT CHANGE UP (ref 8.4–10.5)
CALCIUM SERPL-MCNC: 9.2 MG/DL
CHLORIDE SERPL-SCNC: 107 MMOL/L
CHLORIDE SERPL-SCNC: 109 MMOL/L — HIGH (ref 98–107)
CO2 SERPL-SCNC: 25 MMOL/L
CO2 SERPL-SCNC: 25 MMOL/L — SIGNIFICANT CHANGE UP (ref 22–31)
CREAT SERPL-MCNC: 3.35 MG/DL — HIGH (ref 0.5–1.3)
CREAT SERPL-MCNC: 3.42 MG/DL
GLUCOSE SERPL-MCNC: 108 MG/DL
GLUCOSE SERPL-MCNC: 95 MG/DL — SIGNIFICANT CHANGE UP (ref 70–99)
PHOSPHATE SERPL-MCNC: 4.7 MG/DL
POTASSIUM SERPL-MCNC: 5.3 MMOL/L — SIGNIFICANT CHANGE UP (ref 3.5–5.3)
POTASSIUM SERPL-SCNC: 5.3 MMOL/L — SIGNIFICANT CHANGE UP (ref 3.5–5.3)
POTASSIUM SERPL-SCNC: 6.3 MMOL/L
SODIUM SERPL-SCNC: 143 MMOL/L
SODIUM SERPL-SCNC: 143 MMOL/L — SIGNIFICANT CHANGE UP (ref 135–145)

## 2019-09-20 RX ORDER — AMLODIPINE BESYLATE AND BENAZEPRIL HYDROCHLORIDE 10; 20 MG/1; MG/1
10-20 CAPSULE ORAL
Qty: 90 | Refills: 0 | Status: DISCONTINUED | COMMUNITY
Start: 2018-05-15 | End: 2019-09-20

## 2019-10-03 ENCOUNTER — APPOINTMENT (OUTPATIENT)
Dept: NEPHROLOGY | Facility: CLINIC | Age: 84
End: 2019-10-03
Payer: MEDICARE

## 2019-10-03 VITALS — DIASTOLIC BLOOD PRESSURE: 70 MMHG | SYSTOLIC BLOOD PRESSURE: 160 MMHG

## 2019-10-03 VITALS
SYSTOLIC BLOOD PRESSURE: 176 MMHG | BODY MASS INDEX: 23.48 KG/M2 | HEART RATE: 76 BPM | TEMPERATURE: 98.1 F | OXYGEN SATURATION: 99 % | RESPIRATION RATE: 16 BRPM | WEIGHT: 153.5 LBS | DIASTOLIC BLOOD PRESSURE: 80 MMHG

## 2019-10-03 LAB
ALBUMIN SERPL ELPH-MCNC: 3.5 G/DL
ANION GAP SERPL CALC-SCNC: 14 MMOL/L
BUN SERPL-MCNC: 47 MG/DL
CALCIUM SERPL-MCNC: 8.8 MG/DL
CHLORIDE SERPL-SCNC: 105 MMOL/L
CO2 SERPL-SCNC: 24 MMOL/L
CREAT SERPL-MCNC: 3.37 MG/DL
GLUCOSE SERPL-MCNC: 112 MG/DL
PHOSPHATE SERPL-MCNC: 4 MG/DL
POTASSIUM SERPL-SCNC: 4.4 MMOL/L
SODIUM SERPL-SCNC: 143 MMOL/L

## 2019-10-03 PROCEDURE — 99214 OFFICE O/P EST MOD 30 MIN: CPT

## 2019-10-03 NOTE — ASSESSMENT
[FreeTextEntry1] : 1. CKD stage 3/Proteinuria: Pt. with CKD and proteinuria in setting of longstanding DM and HTN. Serological workup (sent in the past) including HBsAg, Hep C ab, and anti-PLA2R Ab were non-reactive/negative. Pt. however found to have IgA kappa band on serum CARLY. Pt. saw hematologist/oncologist Dr. Palencia at [facility] in May 2019. Dr. Palencia's notes reviewed. Renal ultrasound on 7/20/18 without evidence of renal masses, hydronephrosis, calculi, or renal artery stenosis. Recent Scr increased to 3.35 on 9/20/19. ACE-I discontinued. Check renal panel today. Importance of good glycemic and BP control discussed with patient. Patient advised to avoid NSAIDs, RCAs, and other nephrotoxins.  Monitor renal function.\par \par 2. Hyperkalemia: Pt. found to have elevated serum potassium of 6.3 on labs done during clinic visit on 9/19/19. Pt. was seen at [facility] on 9/19/19 for hyperkalemia. Serum potassium was in high normal range (5.3) on labs done at [facility] on 9/20/19. ACE-I therapy was discontinued. Low potassium diet advised. Check serum potassium today. \par \par 3. HTN: Pt. with elevated BP readings during office visit today. Dose of torsemide recently increased to 40 mg daily for LE edema and HTN management. Low salt diet advised. Monitor BP.\par \par 4. LE edema: Patient with improved bilateral LE edema on examination today. Dose of torsemide to be increased (see above). Low salt diet advised. Monitor body weight. \par \par RTC: 1 month

## 2019-10-03 NOTE — PHYSICAL EXAM
[General Appearance - Alert] : alert [Sclera] : the sclera and conjunctiva were normal [Outer Ear] : the ears and nose were normal in appearance [Jugular Venous Distention Increased] : there was no jugular-venous distention [Auscultation Breath Sounds / Voice Sounds] : lungs were clear to auscultation bilaterally [Heart Sounds] : normal S1 and S2 [Abdomen Soft] : soft [No CVA Tenderness] : no ~M costovertebral angle tenderness [Abnormal Walk] : normal gait [Skin Color & Pigmentation] : normal skin color and pigmentation [Oriented To Time, Place, And Person] : oriented to person, place, and time [Affect] : the affect was normal [Mood] : the mood was normal [FreeTextEntry1] : bilateral LE: mild pitting edema present

## 2019-10-03 NOTE — HISTORY OF PRESENT ILLNESS
[FreeTextEntry1] : 84-year-old male with long standing history of DM, HTN, CKD, IgA kappa band and proteinuria has come for follow-up visit. Pt. was last seen in clinic on 9/19/19. Pt. found to have elevated serum potassium of 6.3 on labs done during clinic visit on 9/19/19. Pt. was seen at [facility] on 9/19/19 for hyperkalemia. Serum potassium was in high normal range (5.3) and Scr was elevated at 3.35 on labs done at [facility] on 9/20/19. ACE-I therapy was discontinued.\par \par Currently, pt. feels well and says his bilateral LE edema has decreased with increased diuretic (torsemide) dose. Pt. denies SOB, CP, abdominal pain, nausea, vomiting or headache.

## 2019-10-04 ENCOUNTER — RESULT REVIEW (OUTPATIENT)
Age: 84
End: 2019-10-04

## 2019-12-12 ENCOUNTER — APPOINTMENT (OUTPATIENT)
Dept: NEPHROLOGY | Facility: CLINIC | Age: 84
End: 2019-12-12
Payer: MEDICARE

## 2019-12-12 ENCOUNTER — RX RENEWAL (OUTPATIENT)
Age: 84
End: 2019-12-12

## 2019-12-12 VITALS
TEMPERATURE: 97.6 F | OXYGEN SATURATION: 98 % | WEIGHT: 156.5 LBS | DIASTOLIC BLOOD PRESSURE: 74 MMHG | BODY MASS INDEX: 23.94 KG/M2 | SYSTOLIC BLOOD PRESSURE: 168 MMHG | HEART RATE: 94 BPM | RESPIRATION RATE: 16 BRPM

## 2019-12-12 VITALS — DIASTOLIC BLOOD PRESSURE: 70 MMHG | SYSTOLIC BLOOD PRESSURE: 160 MMHG

## 2019-12-12 DIAGNOSIS — N18.3 CHRONIC KIDNEY DISEASE, STAGE 3 (MODERATE): ICD-10-CM

## 2019-12-12 PROCEDURE — 99214 OFFICE O/P EST MOD 30 MIN: CPT | Mod: GC

## 2019-12-12 NOTE — ASSESSMENT
[FreeTextEntry1] : 1. CKD stage 4/Proteinuria: Pt. with advanced CKD and proteinuria in setting of longstanding DM and HTN. Serological workup (sent in the past) including HBsAg, Hep C ab, and anti-PLA2R Ab were non-reactive/negative. Pt. however found to have IgA kappa band on serum CARLY. Pt. saw hematologist/oncologist Dr. Palencia at [facility] in May 2019. Dr. Palencia's notes reviewed. Renal ultrasound on 7/20/18 without evidence of renal masses, hydronephrosis, calculi, or renal artery stenosis. Scr increased to 3.35 on 9/20/19 and ACE-I was subsequently discontinued. Last Scr was stable at 3.37 on 10/3/2019. Check renal panel today. Importance of good glycemic and BP control discussed with patient. Patient advised to avoid NSAIDs, RCAs, and other nephrotoxins. Monitor renal function.\par \par 2. Hyperkalemia: Pt. found to have elevated serum potassium of 6.3 on labs done during clinic visit on 9/19/19. Pt. was seen at [facility] on 9/19/19 for hyperkalemia. Serum potassium was in high normal range (5.3) on labs done at [facility] on 9/20/19. ACE-I therapy was discontinued. Last serum potassium on 10/3/2019 was WNL (4.4). Low potassium diet advised. Check serum potassium today. \par \par 3. HTN: Pt. with elevated BP readings during office visit today. Dose of torsemide increased to 60 mg daily for LE edema and HTN management. Low salt diet advised. Monitor BP.\par \par 4. LE edema: Pt. with significant bilateral LE edema on examination today. Dose of torsemide increased (see above). Low salt diet advised. Monitor body weight. \par \par Follow-up pending review of lab results.

## 2019-12-12 NOTE — REVIEW OF SYSTEMS
[As noted in HPI] : as noted in HPI [Lower Ext Edema] : lower extremity edema [As Noted in HPI] : as noted in HPI [Limb Swelling] : limb swelling [Fever] : no fever [Chills] : no chills [Feeling Tired] : not feeling tired [Eyesight Problems] : no eyesight problems [Sore Throat] : no sore throat [Shortness Of Breath] : no shortness of breath [Chest Pain] : no chest pain [Joint Swelling] : no joint swelling [Vomiting] : no vomiting [Dysuria] : no dysuria [Limb Pain] : no limb pain [Skin Lesions] : no skin lesions [Confused] : no confusion [Dizziness] : no dizziness [Depression] : no depression [Anxiety] : no anxiety [Easy Bruising] : no tendency for easy bruising [Easy Bleeding] : no tendency for easy bleeding

## 2019-12-12 NOTE — HISTORY OF PRESENT ILLNESS
[FreeTextEntry1] : 84-year-old male with long standing history of DM, HTN, CKD, IgA kappa band, hyperkalemia and proteinuria has come for follow-up visit. Pt. was last seen in clinic on 10/03/19.\par \par Currently, pt. feels well but gives history of bilateral LE edema. Pt. admits compliance to diuretic (torsemide) therapy. Pt. denies SOB, CP, abdominal pain, nausea, vomiting or headache. Last Scr  was elevated/stable at 3.37 on 10/3/2019 with serum potassium of 4.4.

## 2019-12-12 NOTE — PHYSICAL EXAM
[Sclera] : the sclera and conjunctiva were normal [General Appearance - Alert] : alert [Outer Ear] : the ears and nose were normal in appearance [Jugular Venous Distention Increased] : there was no jugular-venous distention [Auscultation Breath Sounds / Voice Sounds] : lungs were clear to auscultation bilaterally [Heart Sounds] : normal S1 and S2 [Abdomen Soft] : soft [No CVA Tenderness] : no ~M costovertebral angle tenderness [Abnormal Walk] : normal gait [Skin Color & Pigmentation] : normal skin color and pigmentation [Oriented To Time, Place, And Person] : oriented to person, place, and time [Affect] : the affect was normal [Mood] : the mood was normal [FreeTextEntry1] : bilateral LE: pitting edema present

## 2019-12-13 ENCOUNTER — RESULT REVIEW (OUTPATIENT)
Age: 84
End: 2019-12-13

## 2019-12-13 LAB
ALBUMIN SERPL ELPH-MCNC: 3.3 G/DL
ANION GAP SERPL CALC-SCNC: 12 MMOL/L
BUN SERPL-MCNC: 48 MG/DL
CALCIUM SERPL-MCNC: 8.9 MG/DL
CHLORIDE SERPL-SCNC: 105 MMOL/L
CO2 SERPL-SCNC: 25 MMOL/L
CREAT SERPL-MCNC: 4.03 MG/DL
GLUCOSE SERPL-MCNC: 121 MG/DL
PHOSPHATE SERPL-MCNC: 4.6 MG/DL
POTASSIUM SERPL-SCNC: 4.9 MMOL/L
SODIUM SERPL-SCNC: 142 MMOL/L

## 2020-01-23 ENCOUNTER — APPOINTMENT (OUTPATIENT)
Dept: NEPHROLOGY | Facility: CLINIC | Age: 85
End: 2020-01-23
Payer: MEDICARE

## 2020-01-23 VITALS
BODY MASS INDEX: 24.53 KG/M2 | HEART RATE: 98 BPM | TEMPERATURE: 97 F | WEIGHT: 160 LBS | HEIGHT: 67.8 IN | DIASTOLIC BLOOD PRESSURE: 73 MMHG | SYSTOLIC BLOOD PRESSURE: 164 MMHG | RESPIRATION RATE: 18 BRPM

## 2020-01-23 VITALS — SYSTOLIC BLOOD PRESSURE: 154 MMHG | DIASTOLIC BLOOD PRESSURE: 76 MMHG

## 2020-01-23 PROCEDURE — 99214 OFFICE O/P EST MOD 30 MIN: CPT | Mod: GC

## 2020-01-23 NOTE — PHYSICAL EXAM
[General Appearance - Alert] : alert [Sclera] : the sclera and conjunctiva were normal [Jugular Venous Distention Increased] : there was no jugular-venous distention [Outer Ear] : the ears and nose were normal in appearance [Auscultation Breath Sounds / Voice Sounds] : lungs were clear to auscultation bilaterally [Heart Sounds] : normal S1 and S2 [Abdomen Soft] : soft [No CVA Tenderness] : no ~M costovertebral angle tenderness [Abnormal Walk] : normal gait [Skin Color & Pigmentation] : normal skin color and pigmentation [Oriented To Time, Place, And Person] : oriented to person, place, and time [Affect] : the affect was normal [Mood] : the mood was normal [FreeTextEntry1] : bilateral LE: pitting edema present

## 2020-01-23 NOTE — ASSESSMENT
[FreeTextEntry1] : 1. CKD stage 4/Proteinuria: Pt. with advanced CKD and proteinuria in setting of longstanding DM and HTN. Serological workup (sent in the past) including HBsAg, Hep C ab, and anti-PLA2R Ab were non-reactive/negative. Pt. however found to have IgA kappa band on serum CARLY. Pt. saw hematologist/oncologist Dr. Palencia at [facility] in May 2019. Dr. Palencia's notes reviewed. Renal ultrasound on 7/20/18 without evidence of renal masses, hydronephrosis, calculi, or renal artery stenosis. Scr increased to 3.35 on 9/20/19 and ACE-I was subsequently discontinued. Last Scr increased to 4.03 on 12/12/2019. Check renal panel today. Importance of good glycemic and BP control discussed with patient. Patient advised to avoid NSAIDs, RCAs, and other nephrotoxins. Monitor renal function.\par \par 2. Hyperkalemia: in setting of advanced CKD. Last serum potassium was WNL (4.9) on 12/12/2019. Low potassium diet advised. Check serum potassium today. \par \par 3. HTN: Pt. with elevated BP readings during office visit today. Add oral doxazosin 2 mg daily. Low salt diet advised. Monitor BP.\par \par 4. LE edema: Pt. with improved bilateral LE edema on examination today. Continue Torsemide 60 mg daily. Low salt diet advised. Monitor body weight. \par \par Follow-up pending review of lab results.

## 2020-01-23 NOTE — HISTORY OF PRESENT ILLNESS
[FreeTextEntry1] : 84-year-old male with long standing history of DM, HTN, CKD, IgA kappa band, hyperkalemia and proteinuria has come for follow-up visit. Pt. was last seen in clinic on 12/12/19.\par \par Currently, pt. feels well, denies any complaints. Reports compliance to diuretic (torsemide) therapy.  Patient reports good urine output and attributes weight gain to eating more during holiday season. Pt. denies SOB, CP, abdominal pain, nausea, vomiting or headache. Last Scr was elevated at 4.03 and serum potassium was WNL (4.9) on 12/12/19.

## 2020-01-24 ENCOUNTER — RESULT REVIEW (OUTPATIENT)
Age: 85
End: 2020-01-24

## 2020-01-24 LAB
ALBUMIN SERPL ELPH-MCNC: 3.2 G/DL
ANION GAP SERPL CALC-SCNC: 13 MMOL/L
BUN SERPL-MCNC: 45 MG/DL
CALCIUM SERPL-MCNC: 8.2 MG/DL
CHLORIDE SERPL-SCNC: 107 MMOL/L
CO2 SERPL-SCNC: 24 MMOL/L
CREAT SERPL-MCNC: 4.6 MG/DL
GLUCOSE SERPL-MCNC: 191 MG/DL
PHOSPHATE SERPL-MCNC: 4.7 MG/DL
POTASSIUM SERPL-SCNC: 4.4 MMOL/L
SODIUM SERPL-SCNC: 143 MMOL/L

## 2020-03-05 ENCOUNTER — RX RENEWAL (OUTPATIENT)
Age: 85
End: 2020-03-05

## 2020-03-05 ENCOUNTER — APPOINTMENT (OUTPATIENT)
Dept: NEPHROLOGY | Facility: CLINIC | Age: 85
End: 2020-03-05
Payer: MEDICARE

## 2020-03-05 VITALS
BODY MASS INDEX: 24.07 KG/M2 | DIASTOLIC BLOOD PRESSURE: 67 MMHG | RESPIRATION RATE: 16 BRPM | TEMPERATURE: 98 F | HEART RATE: 83 BPM | SYSTOLIC BLOOD PRESSURE: 163 MMHG | OXYGEN SATURATION: 100 % | HEIGHT: 67.8 IN | WEIGHT: 157 LBS

## 2020-03-05 VITALS — DIASTOLIC BLOOD PRESSURE: 70 MMHG | SYSTOLIC BLOOD PRESSURE: 154 MMHG

## 2020-03-05 PROCEDURE — 99214 OFFICE O/P EST MOD 30 MIN: CPT | Mod: GC

## 2020-03-05 NOTE — ASSESSMENT
[FreeTextEntry1] : 1. CKD stage 5/Proteinuria: Pt. with advanced CKD and proteinuria in setting of longstanding DM and HTN. Serological workup (sent in the past) including HBsAg, Hep C ab, and anti-PLA2R Ab were non-reactive/negative. Pt. however found to have IgA kappa band on serum CARLY. Pt. saw hematologist/oncologist Dr. Palencia at [facility] in May 2019. Dr. Palencia's notes reviewed. Renal ultrasound on 7/20/18 without evidence of renal masses, hydronephrosis, calculi, or renal artery stenosis. Scr increased to 3.35 on 9/20/19 and ACE-I was subsequently discontinued. Scr however increased further to 4.60 on labs done in 1/24/2020. Last Scr elevated but stable at 4.47 on labs done in PMD's office on 2/10/20. Pt. with progressive/advanced CKD. Various options for RRT including HD reviewed with patient in presence of RN from [facility] program. Pt. agreeable to undergo HD in the future (when needed). Pt. to be referred to vascular surgeon Dr. Emmanuel for AVF creation surgery. Importance of good glycemic and BP control discussed with patient. Patient advised to avoid NSAIDs, RCAs, and other nephrotoxins. Monitor renal function.\par \par 2. Hyperkalemia: in setting of advanced CKD. Last serum potassium was WNL (4.1) on 2/10/20. Low potassium diet advised. Monitor serum potassium. \par \par 3. HTN: Pt. with elevated BP readings during office visit today. Increased oral doxazosin to 4 mg daily. Low salt diet advised. Monitor BP.\par \par 4. LE edema: Pt. with improved bilateral LE edema on examination today. Continue Torsemide 60 mg daily. Low salt diet advised. Monitor body weight. \par \par Follow-up in 6-8 weeks.

## 2020-03-05 NOTE — PHYSICAL EXAM
[General Appearance - Alert] : alert [Sclera] : the sclera and conjunctiva were normal [Outer Ear] : the ears and nose were normal in appearance [Jugular Venous Distention Increased] : there was no jugular-venous distention [Auscultation Breath Sounds / Voice Sounds] : lungs were clear to auscultation bilaterally [Heart Sounds] : normal S1 and S2 [Abdomen Soft] : soft [No CVA Tenderness] : no ~M costovertebral angle tenderness [Abnormal Walk] : normal gait [Skin Color & Pigmentation] : normal skin color and pigmentation [Oriented To Time, Place, And Person] : oriented to person, place, and time [Affect] : the affect was normal [Mood] : the mood was normal [FreeTextEntry1] : bilateral LE: pitting edema present

## 2020-03-05 NOTE — HISTORY OF PRESENT ILLNESS
[FreeTextEntry1] : 85-year-old male with long standing history of DM, HTN, advanced CKD, IgA kappa band, hyperkalemia and proteinuria has come for follow-up visit. Pt. was last seen in clinic on 1/23/20.\par \par Currently, pt. feels well, denies any complaints. Reports compliance to diuretic (torsemide) therapy. Pt. denies SOB, CP, abdominal pain, nausea, vomiting or headache. Last Scr was elevated at 4.47 on labs done in PMD's office on 2/10/20.

## 2020-04-02 ENCOUNTER — APPOINTMENT (OUTPATIENT)
Dept: VASCULAR SURGERY | Facility: CLINIC | Age: 85
End: 2020-04-02

## 2020-04-27 LAB
ALBUMIN SERPL ELPH-MCNC: 3.1 G/DL
ANION GAP SERPL CALC-SCNC: 14 MMOL/L
BASOPHILS # BLD AUTO: 0.02 K/UL
BASOPHILS NFR BLD AUTO: 0.3 %
BUN SERPL-MCNC: 66 MG/DL
CALCIUM SERPL-MCNC: 8 MG/DL
CHLORIDE SERPL-SCNC: 108 MMOL/L
CO2 SERPL-SCNC: 20 MMOL/L
CREAT SERPL-MCNC: 5.83 MG/DL
EOSINOPHIL # BLD AUTO: 0.14 K/UL
EOSINOPHIL NFR BLD AUTO: 2.3 %
GLUCOSE SERPL-MCNC: 155 MG/DL
HCT VFR BLD CALC: 32.7 %
HGB BLD-MCNC: 10.1 G/DL
IMM GRANULOCYTES NFR BLD AUTO: 0.2 %
LYMPHOCYTES # BLD AUTO: 1.48 K/UL
LYMPHOCYTES NFR BLD AUTO: 24.7 %
MAN DIFF?: NORMAL
MCHC RBC-ENTMCNC: 26.2 PG
MCHC RBC-ENTMCNC: 30.9 GM/DL
MCV RBC AUTO: 84.7 FL
MONOCYTES # BLD AUTO: 0.41 K/UL
MONOCYTES NFR BLD AUTO: 6.8 %
NEUTROPHILS # BLD AUTO: 3.93 K/UL
NEUTROPHILS NFR BLD AUTO: 65.7 %
PHOSPHATE SERPL-MCNC: 5.6 MG/DL
PLATELET # BLD AUTO: 186 K/UL
POTASSIUM SERPL-SCNC: 4.7 MMOL/L
RBC # BLD: 3.86 M/UL
RBC # FLD: 14 %
SODIUM SERPL-SCNC: 141 MMOL/L
WBC # FLD AUTO: 5.99 K/UL

## 2020-04-28 ENCOUNTER — APPOINTMENT (OUTPATIENT)
Dept: VASCULAR SURGERY | Facility: CLINIC | Age: 85
End: 2020-04-28
Payer: MEDICARE

## 2020-04-28 VITALS
BODY MASS INDEX: 24.64 KG/M2 | HEIGHT: 67 IN | DIASTOLIC BLOOD PRESSURE: 76 MMHG | WEIGHT: 157 LBS | SYSTOLIC BLOOD PRESSURE: 173 MMHG | HEART RATE: 86 BPM | TEMPERATURE: 98.5 F

## 2020-04-28 PROCEDURE — 99203 OFFICE O/P NEW LOW 30 MIN: CPT

## 2020-04-28 PROCEDURE — 93986 DUP-SCAN HEMO COMPL UNI STD: CPT

## 2020-04-28 NOTE — HISTORY OF PRESENT ILLNESS
[FreeTextEntry1] : 85-year-old gentleman with a history of diabetes and hypertension presents to the office with worsening renal function.  Patient's creatinine clearance at this point is approximately 10.  He is here in consultation for creation of hemodialysis access.  Patient is right-handed.

## 2020-04-28 NOTE — ASSESSMENT
[FreeTextEntry1] : Patient underwent left arm vein mapping which shows adequate cephalic vein beginning at the left wrist.  We will schedule patient for left radiocephalic fistula

## 2020-04-28 NOTE — PHYSICAL EXAM
[Normal Rate and Rhythm] : normal rate and rhythm [Normal Breath Sounds] : Normal breath sounds [2+] : left 2+ [No Rash or Lesion] : No rash or lesion [Alert] : alert [Calm] : calm [JVD] : no jugular venous distention  [Ankle Swelling (On Exam)] : not present [Varicose Veins Of Lower Extremities] : not present [] : not present [Abdomen Tenderness] : ~T ~M No abdominal tenderness [de-identified] : Appears well [Skin Ulcer] : no ulcer

## 2020-05-06 PROBLEM — N18.4 CKD (CHRONIC KIDNEY DISEASE), STAGE IV: Status: ACTIVE | Noted: 2019-12-12

## 2020-05-07 ENCOUNTER — APPOINTMENT (OUTPATIENT)
Dept: ENDOVASCULAR SURGERY | Facility: CLINIC | Age: 85
End: 2020-05-07
Payer: MEDICARE

## 2020-05-07 DIAGNOSIS — N18.4 CHRONIC KIDNEY DISEASE, STAGE 4 (SEVERE): ICD-10-CM

## 2020-05-07 PROCEDURE — 35321 RECHANNELING OF ARTERY: CPT | Mod: 82,LT

## 2020-05-07 PROCEDURE — 35321 RECHANNELING OF ARTERY: CPT | Mod: LT

## 2020-05-07 PROCEDURE — 36820 AV FUSION/FOREARM VEIN: CPT | Mod: 59,LT

## 2020-05-07 NOTE — PROCEDURE
[D/C IV on discharge] : D/C IV on discharge [Resume diet] : resume diet [FreeTextEntry1] : Creation left radiocephalic transposed AV fistula\par Left radial artery endarterectomy [FreeTextEntry2] : 85-year-old gentleman not currently on hemodialysis presents to the office for creation of left radiocephalic AV fistula. [FreeTextEntry3] : Attending Surgeon: Savage Adams MD\par Assistant Surgeon: Juan Rincon MD\par \par The patient was taken to the procedure area placed in supine position his left arm was prepped and draped in usual sterile fashion, a timeout was performed care was discussed with anesthesia.  1% lidocaine was infiltrated over the left wrist.  An incision was then made.  This was carried down to the subcutaneous tissue and the cephalic vein was visualized.  It was then dissected both proximally and distally at all side branches were ligated divided using 4-0 silk ties.  It was then transected distally and gently dilated with heparinized saline solution.  The deep fascia was then opened and the radial artery was identified Vesseloops were placed and a small amount of systemic heparin was given.  Clamps were then placed and arteriotomy was made.  There was debris found in the artery and a limited endarterectomy was performed and a good endpoint was then obtained.  The cephalic vein was sewn to the radial artery using a running 6-0 Prolene stitch.  Upon completion the clamps were released there was found to be good flow into the cephalic vein.  Hemostasis was then achieved.  The deep layer was closed using interrupted Vicryl stitches.  The skin was closed using nylon sutures.  Dry sterile dressing was applied and an excellent thrill was noted at the completion of the case.

## 2020-05-21 ENCOUNTER — APPOINTMENT (OUTPATIENT)
Dept: VASCULAR SURGERY | Facility: CLINIC | Age: 85
End: 2020-05-21
Payer: MEDICARE

## 2020-05-21 PROCEDURE — 93990 DOPPLER FLOW TESTING: CPT

## 2020-05-21 PROCEDURE — 99024 POSTOP FOLLOW-UP VISIT: CPT

## 2020-05-21 NOTE — REASON FOR VISIT
[de-identified] : Creation left radiocephalic AV fistula [de-identified] : Status post creation left radiocephalic AV fistula.  Patient is doing well.  No complaints.

## 2020-05-21 NOTE — PHYSICAL EXAM
[JVD] : no jugular venous distention  [Normal Rate and Rhythm] : normal rate and rhythm [Normal Breath Sounds] : Normal breath sounds [de-identified] : Appears well [FreeTextEntry1] : Patient with weak thrill in the cephalic vein.

## 2020-05-21 NOTE — DISCUSSION/SUMMARY
[FreeTextEntry1] : The patient underwent a duplex study which shows a patent fistula.  There is stenosis in the cephalic vein in the forearm.  There is still a low flow rate of only 480 cc/min.  Will schedule patient for maturation several weeks.

## 2020-06-02 ENCOUNTER — APPOINTMENT (OUTPATIENT)
Dept: DISASTER EMERGENCY | Facility: CLINIC | Age: 85
End: 2020-06-02

## 2020-06-02 DIAGNOSIS — Z01.818 ENCOUNTER FOR OTHER PREPROCEDURAL EXAMINATION: ICD-10-CM

## 2020-06-02 LAB — SARS-COV-2 N GENE NPH QL NAA+PROBE: NOT DETECTED

## 2020-06-04 ENCOUNTER — FORM ENCOUNTER (OUTPATIENT)
Age: 85
End: 2020-06-04

## 2020-06-05 ENCOUNTER — APPOINTMENT (OUTPATIENT)
Dept: ENDOVASCULAR SURGERY | Facility: CLINIC | Age: 85
End: 2020-06-05
Payer: MEDICARE

## 2020-06-05 VITALS
TEMPERATURE: 98 F | DIASTOLIC BLOOD PRESSURE: 77 MMHG | SYSTOLIC BLOOD PRESSURE: 170 MMHG | RESPIRATION RATE: 18 BRPM | WEIGHT: 165 LBS | HEART RATE: 86 BPM | OXYGEN SATURATION: 98 % | HEIGHT: 68 IN | BODY MASS INDEX: 25.01 KG/M2

## 2020-06-05 PROCEDURE — 36011Z: CUSTOM | Mod: 59

## 2020-06-05 PROCEDURE — 36902Z: CUSTOM | Mod: 58,59

## 2020-06-05 PROCEDURE — 36909Z: CUSTOM

## 2020-06-05 NOTE — HISTORY OF PRESENT ILLNESS
[] : left radiocephalic fistula [FreeTextEntry1] : 5/7/2020 Dr Adams [FreeTextEntry4] : N/A [FreeTextEntry6] : Dr Munoz  [FreeTextEntry5] : yesterday at 6:30

## 2020-06-05 NOTE — PAST MEDICAL HISTORY
[Increasing age ( >40 years old)] : Increasing age ( >40 years old) [No therapy indicated for cases scheduled for less than one hour] : No therapy indicated for cases scheduled for less than one hour. [FreeTextEntry1] : Malignant Hyperthermia Screening Tool and Risk of Bleeding Assessment \par \par Mr. VINCENT VALE denies family of unexpected death following Anesthesia or Exercise.\par Denies Family history of Malignant Hyperthermia, Muscle or Neuromuscular disorder and High Temperature  following exercise.\par \par Mr. VINCENT VALE denies history of Muscle Spasm, Dark or Chocolate- Colored and Unanticipated fever immediately following anaesthesia or serious exercise.\par Mr. VINCENT VALE also denies bleeding tendencies/Risks of Bleeding.\par

## 2020-06-09 ENCOUNTER — LABORATORY RESULT (OUTPATIENT)
Age: 85
End: 2020-06-09

## 2020-06-11 ENCOUNTER — APPOINTMENT (OUTPATIENT)
Dept: NEPHROLOGY | Facility: CLINIC | Age: 85
End: 2020-06-11
Payer: MEDICARE

## 2020-06-11 VITALS — SYSTOLIC BLOOD PRESSURE: 154 MMHG | DIASTOLIC BLOOD PRESSURE: 70 MMHG

## 2020-06-11 VITALS
DIASTOLIC BLOOD PRESSURE: 66 MMHG | TEMPERATURE: 97.7 F | HEART RATE: 82 BPM | WEIGHT: 167 LBS | HEIGHT: 68 IN | RESPIRATION RATE: 16 BRPM | SYSTOLIC BLOOD PRESSURE: 162 MMHG | BODY MASS INDEX: 25.31 KG/M2

## 2020-06-11 PROCEDURE — 99214 OFFICE O/P EST MOD 30 MIN: CPT

## 2020-06-11 NOTE — REVIEW OF SYSTEMS
[As noted in HPI] : as noted in HPI [Lower Ext Edema] : lower extremity edema [As Noted in HPI] : as noted in HPI [Limb Swelling] : limb swelling [Fever] : no fever [Chills] : no chills [Eyesight Problems] : no eyesight problems [Feeling Tired] : not feeling tired [Sore Throat] : no sore throat [Shortness Of Breath] : no shortness of breath [Chest Pain] : no chest pain [Joint Swelling] : no joint swelling [Dysuria] : no dysuria [Vomiting] : no vomiting [Skin Lesions] : no skin lesions [Limb Pain] : no limb pain [Confused] : no confusion [Dizziness] : no dizziness [Anxiety] : no anxiety [Easy Bleeding] : no tendency for easy bleeding [Depression] : no depression [Easy Bruising] : no tendency for easy bruising

## 2020-06-11 NOTE — HISTORY OF PRESENT ILLNESS
[FreeTextEntry1] : 85-year-old male with long standing history of DM, HTN, advanced CKD, IgA kappa band, hyperkalemia and proteinuria has come for follow-up visit. Pt. was last seen in clinic on 3/5/20.\par \par Pt. says he underwent LUE AVF creation surgery by Dr. Adams last month. Dr. Adams's (surgical/endovascular) note dated 5/7/20 reviewed. Pt. underwent LUE AVF creation surgery on 5/7/20. Scr increased to 7.2 on labs done in vascular surgeon's office this week on 6/9/20.   \par \par Pt. currently feels well but gives history of increased bilateral LE swelling. Pt. reports compliance to diuretic (torsemide) therapy. Pt. denies SOB, CP, abdominal pain, nausea, vomiting or headache. Last Scr was elevated at 4.47 on labs done in PMD's office on 2/10/20.

## 2020-06-11 NOTE — PHYSICAL EXAM
[General Appearance - Alert] : alert [Sclera] : the sclera and conjunctiva were normal [Outer Ear] : the ears and nose were normal in appearance [Neck Appearance] : the appearance of the neck was normal [Jugular Venous Distention Increased] : there was no jugular-venous distention [] : no respiratory distress [Respiration, Rhythm And Depth] : normal respiratory rhythm and effort [Auscultation Breath Sounds / Voice Sounds] : lungs were clear to auscultation bilaterally [Heart Sounds] : normal S1 and S2 [Abnormal Walk] : normal gait [Abdomen Soft] : soft [No CVA Tenderness] : no ~M costovertebral angle tenderness [Oriented To Time, Place, And Person] : oriented to person, place, and time [Skin Color & Pigmentation] : normal skin color and pigmentation [Affect] : the affect was normal [Mood] : the mood was normal [Bruit] : a bruit was present [___ (cm) Fistula] : [unfilled] (cm) fistula [Thrill] : a thrill was present [FreeTextEntry1] : LUE AVF
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
## 2020-06-17 ENCOUNTER — APPOINTMENT (OUTPATIENT)
Dept: DISASTER EMERGENCY | Facility: CLINIC | Age: 85
End: 2020-06-17

## 2020-06-17 ENCOUNTER — LABORATORY RESULT (OUTPATIENT)
Age: 85
End: 2020-06-17

## 2020-06-18 ENCOUNTER — FORM ENCOUNTER (OUTPATIENT)
Age: 85
End: 2020-06-18

## 2020-06-19 ENCOUNTER — APPOINTMENT (OUTPATIENT)
Dept: ENDOVASCULAR SURGERY | Facility: CLINIC | Age: 85
End: 2020-06-19
Payer: MEDICARE

## 2020-06-19 VITALS
DIASTOLIC BLOOD PRESSURE: 57 MMHG | HEART RATE: 83 BPM | WEIGHT: 162 LBS | BODY MASS INDEX: 24.55 KG/M2 | SYSTOLIC BLOOD PRESSURE: 161 MMHG | HEIGHT: 68 IN | OXYGEN SATURATION: 98 % | TEMPERATURE: 97.8 F | RESPIRATION RATE: 18 BRPM

## 2020-06-19 PROCEDURE — 36902Z: CUSTOM | Mod: 58

## 2020-06-19 NOTE — HISTORY OF PRESENT ILLNESS
[] : left radiocephalic fistula [FreeTextEntry1] : 5/7/2020 Dr Adams [FreeTextEntry4] : N/A [FreeTextEntry5] : yesterday at 6:30 [FreeTextEntry6] : Dr Munoz

## 2020-06-25 ENCOUNTER — APPOINTMENT (OUTPATIENT)
Dept: NEPHROLOGY | Facility: CLINIC | Age: 85
End: 2020-06-25
Payer: MEDICARE

## 2020-06-25 VITALS
BODY MASS INDEX: 25.46 KG/M2 | HEART RATE: 91 BPM | SYSTOLIC BLOOD PRESSURE: 157 MMHG | DIASTOLIC BLOOD PRESSURE: 67 MMHG | HEIGHT: 68 IN | TEMPERATURE: 98.1 F | WEIGHT: 168 LBS | RESPIRATION RATE: 18 BRPM

## 2020-06-25 VITALS — DIASTOLIC BLOOD PRESSURE: 70 MMHG | SYSTOLIC BLOOD PRESSURE: 150 MMHG

## 2020-06-25 DIAGNOSIS — E87.5 HYPERKALEMIA: ICD-10-CM

## 2020-06-25 DIAGNOSIS — R60.0 LOCALIZED EDEMA: ICD-10-CM

## 2020-06-25 DIAGNOSIS — N18.5 CHRONIC KIDNEY DISEASE, STAGE 5: ICD-10-CM

## 2020-06-25 DIAGNOSIS — I10 ESSENTIAL (PRIMARY) HYPERTENSION: ICD-10-CM

## 2020-06-25 LAB
BASOPHILS # BLD AUTO: 0.01 K/UL
BASOPHILS NFR BLD AUTO: 0.2 %
EOSINOPHIL # BLD AUTO: 0.33 K/UL
EOSINOPHIL NFR BLD AUTO: 6.1 %
HCT VFR BLD CALC: 27.2 %
HGB BLD-MCNC: 8.5 G/DL
IMM GRANULOCYTES NFR BLD AUTO: 0.4 %
LYMPHOCYTES # BLD AUTO: 1.26 K/UL
LYMPHOCYTES NFR BLD AUTO: 23.2 %
MAN DIFF?: NORMAL
MCHC RBC-ENTMCNC: 27.3 PG
MCHC RBC-ENTMCNC: 31.3 GM/DL
MCV RBC AUTO: 87.5 FL
MONOCYTES # BLD AUTO: 0.46 K/UL
MONOCYTES NFR BLD AUTO: 8.5 %
NEUTROPHILS # BLD AUTO: 3.36 K/UL
NEUTROPHILS NFR BLD AUTO: 61.6 %
PLATELET # BLD AUTO: 199 K/UL
RBC # BLD: 3.11 M/UL
RBC # FLD: 14 %
WBC # FLD AUTO: 5.44 K/UL

## 2020-06-25 PROCEDURE — 99214 OFFICE O/P EST MOD 30 MIN: CPT

## 2020-06-25 RX ORDER — OXYCODONE AND ACETAMINOPHEN 5; 325 MG/1; MG/1
5-325 TABLET ORAL
Qty: 18 | Refills: 0 | Status: DISCONTINUED | COMMUNITY
Start: 2020-05-07 | End: 2020-06-25

## 2020-06-25 NOTE — HISTORY OF PRESENT ILLNESS
[FreeTextEntry1] : 85-year-old male with long standing history of DM, HTN, advanced CKD, IgA kappa band, hyperkalemia and proteinuria has come for follow-up visit. Pt. was last seen in clinic on 6/11/20.\par \par Pt. underwent LUE AVF creation surgery by Dr. Adams on 5/7/20. Scr increased to 7.2 on labs done in vascular surgeon's office on 6/9/20.   \par \par Pt. currently feels well but gives history of bilateral LE swelling. Pt. reports compliance to diuretic (torsemide) therapy. Pt. denies SOB, CP, abdominal pain, nausea, vomiting or headache. Last Scr increased to 7.2 on labs done in vascular surgeon's office on 6/9/20.

## 2020-06-25 NOTE — PHYSICAL EXAM
[General Appearance - Alert] : alert [Sclera] : the sclera and conjunctiva were normal [Outer Ear] : the ears and nose were normal in appearance [Neck Appearance] : the appearance of the neck was normal [Jugular Venous Distention Increased] : there was no jugular-venous distention [] : no respiratory distress [Respiration, Rhythm And Depth] : normal respiratory rhythm and effort [Auscultation Breath Sounds / Voice Sounds] : lungs were clear to auscultation bilaterally [Heart Sounds] : normal S1 and S2 [Abdomen Soft] : soft [No CVA Tenderness] : no ~M costovertebral angle tenderness [Abnormal Walk] : normal gait [Bruit] : a bruit was present [___ (cm) Fistula] : [unfilled] (cm) fistula [Thrill] : a thrill was present [Skin Color & Pigmentation] : normal skin color and pigmentation [Affect] : the affect was normal [Oriented To Time, Place, And Person] : oriented to person, place, and time [Mood] : the mood was normal [FreeTextEntry1] : LUE AVF

## 2020-06-25 NOTE — ASSESSMENT
[FreeTextEntry1] : 1. CKD stage 5/Proteinuria: Pt. with advanced CKD and proteinuria in setting of longstanding DM and HTN. Serological workup (sent in the past) including HBsAg, Hep C ab, and anti-PLA2R Ab were non-reactive/negative. Pt. however found to have IgA kappa band on serum CARLY. Pt. saw hematologist/oncologist Dr. Palencia at [facility] in May 2019. Renal ultrasound on 7/20/18 without evidence of renal masses, hydronephrosis, calculi, or renal artery stenosis. Scr increased to 3.35 on 9/20/19 and ACE-I was subsequently discontinued. Scr however increased further to 4.60 on labs done in 1/24/2020. Pt. with progressive/advanced CKD. Scr increased to 7.2 on labs done in vascular surgeon's office this week on 6/9/20. Pt. underwent LUE AVF creation surgery on 5/7/20. AVF maturing well at present. Pt. has a follow-up appointment with Dr. Adams on 7/7/20. No uremic complaints at present. Pt. advised to go to ER if he develops any new complaints. Patient advised to avoid NSAIDs, RCAs, and other nephrotoxins.Check renal panel today. \par \par 2. Hyperkalemia: in setting of advanced CKD. Last serum potassium was WNL (4.1) on 6/11/20. Low potassium diet advised. Check serum potassium today. \par \par 3. HTN: Repeat BP reading improved during office visit today. Low salt diet advised. Monitor BP on current BP meds.\par \par 4. LE edema: Pt. with bilateral LE edema on examination today. Continue with torsemide to 100 mg once daily. Low salt diet advised. Monitor body weight. \par \par Follow-up in 2-3 weeks.

## 2020-06-25 NOTE — REVIEW OF SYSTEMS
[As noted in HPI] : as noted in HPI [Lower Ext Edema] : lower extremity edema [As Noted in HPI] : as noted in HPI [Limb Swelling] : limb swelling [Fever] : no fever [Chills] : no chills [Eyesight Problems] : no eyesight problems [Feeling Tired] : not feeling tired [Sore Throat] : no sore throat [Chest Pain] : no chest pain [Shortness Of Breath] : no shortness of breath [Vomiting] : no vomiting [Joint Swelling] : no joint swelling [Dysuria] : no dysuria [Limb Pain] : no limb pain [Confused] : no confusion [Skin Lesions] : no skin lesions [Anxiety] : no anxiety [Dizziness] : no dizziness [Easy Bruising] : no tendency for easy bruising [Easy Bleeding] : no tendency for easy bleeding [Depression] : no depression

## 2020-06-26 PROBLEM — R60.0 BILATERAL LEG EDEMA: Status: ACTIVE | Noted: 2018-07-12

## 2020-06-26 PROBLEM — I10 HYPERTENSION: Status: ACTIVE | Noted: 2018-07-12

## 2020-06-26 PROBLEM — E87.5 HYPERKALEMIA: Status: ACTIVE | Noted: 2019-10-03

## 2020-06-26 PROBLEM — N18.5 STAGE 5 CHRONIC KIDNEY DISEASE: Status: ACTIVE | Noted: 2020-03-04

## 2020-06-26 LAB
ALBUMIN SERPL ELPH-MCNC: 2.9 G/DL
ANION GAP SERPL CALC-SCNC: 19 MMOL/L
BUN SERPL-MCNC: 61 MG/DL
CALCIUM SERPL-MCNC: 7 MG/DL
CHLORIDE SERPL-SCNC: 109 MMOL/L
CO2 SERPL-SCNC: 16 MMOL/L
CREAT SERPL-MCNC: 7.85 MG/DL
GLUCOSE SERPL-MCNC: 162 MG/DL
HAV IGM SER QL: NONREACTIVE
HBV CORE IGM SER QL: NONREACTIVE
HBV SURFACE AG SER QL: NONREACTIVE
HCV AB SER QL: NONREACTIVE
HCV S/CO RATIO: 0.06 S/CO
PHOSPHATE SERPL-MCNC: 5.8 MG/DL
POTASSIUM SERPL-SCNC: 4.4 MMOL/L
SODIUM SERPL-SCNC: 144 MMOL/L

## 2020-07-02 LAB
M TB IFN-G BLD-IMP: NEGATIVE
QUANTIFERON TB PLUS MITOGEN MINUS NIL: 6.48 IU/ML
QUANTIFERON TB PLUS NIL: 0.05 IU/ML
QUANTIFERON TB PLUS TB1 MINUS NIL: -0.02 IU/ML
QUANTIFERON TB PLUS TB2 MINUS NIL: -0.03 IU/ML

## 2020-07-07 ENCOUNTER — APPOINTMENT (OUTPATIENT)
Dept: VASCULAR SURGERY | Facility: CLINIC | Age: 85
End: 2020-07-07
Payer: MEDICARE

## 2020-07-07 PROCEDURE — 93990 DOPPLER FLOW TESTING: CPT

## 2020-07-07 PROCEDURE — 99024 POSTOP FOLLOW-UP VISIT: CPT

## 2020-07-07 NOTE — REASON FOR VISIT
[Other ___] : a [unfilled] visit for [Follow-Up Visit] : a follow-up visit for [Fistula Creation] : fistula creation

## 2020-07-07 NOTE — DISCUSSION/SUMMARY
[FreeTextEntry1] : 86 yo male with history of ckd not currently on hd presents for follow up of left upper extremity radial cephalic avf\par duplex shows patent avf measuring about 0.6 cm with flow rate of 1219 \par on exam avf is superficial with good thrill \par \par avf is ready to be accessed if necessary\par pt to follow up in 3 months with repeat duplex

## 2020-07-07 NOTE — PHYSICAL EXAM
[Pulsatile Thrill] : no pulsatile thrill [Aneurysm] : no aneurysm [Bleeding] : no bleeding [Ulcer] : no ulcer [de-identified] : incision healed

## 2020-07-07 NOTE — HISTORY OF PRESENT ILLNESS
[FreeTextEntry1] : 86 yo male with history of ckd not currently on hd presents for follow up of left upper extremity radial cephalic avf.  pt without any complaints \par per discussion with the daughter pt was supposed to start hd soon.

## 2020-07-07 NOTE — PHYSICAL EXAM
[Normal] : no acute distress, well-nourished, well developed, well appearing [Thrill] : thrill [Hand well perfused] : hand well perfused [2+] : left 2+

## 2020-07-17 ENCOUNTER — APPOINTMENT (OUTPATIENT)
Dept: NEPHROLOGY | Facility: CLINIC | Age: 85
End: 2020-07-17

## 2020-08-10 RX ORDER — LIDOCAINE AND PRILOCAINE 25; 25 MG/G; MG/G
2.5-2.5 CREAM TOPICAL
Qty: 3 | Refills: 5 | Status: ACTIVE | COMMUNITY
Start: 2020-08-10 | End: 1900-01-01

## 2020-09-02 ENCOUNTER — RX RENEWAL (OUTPATIENT)
Age: 85
End: 2020-09-02

## 2020-09-02 RX ORDER — AMLODIPINE BESYLATE 10 MG/1
10 TABLET ORAL DAILY
Qty: 90 | Refills: 1 | Status: ACTIVE | COMMUNITY
Start: 2019-09-20 | End: 1900-01-01

## 2020-09-04 LAB
POTASSIUM SERPL-MCNC: 5.8 MMOL/L — HIGH (ref 3.5–5.3)
POTASSIUM SERPL-SCNC: 5.8 MMOL/L — HIGH (ref 3.5–5.3)

## 2020-10-13 ENCOUNTER — APPOINTMENT (OUTPATIENT)
Dept: VASCULAR SURGERY | Facility: CLINIC | Age: 85
End: 2020-10-13
Payer: MEDICARE

## 2020-10-13 PROCEDURE — 93990 DOPPLER FLOW TESTING: CPT

## 2020-10-13 PROCEDURE — 99213 OFFICE O/P EST LOW 20 MIN: CPT

## 2020-10-13 NOTE — DISCUSSION/SUMMARY
[FreeTextEntry1] : 84 yo male with history of esrd on hd presents for follow up of left upper extremity radial cephalic avf\par \par duplex shows 50-75% stenosis fo the mid forearm with flow rate of 812 \par \par will continue to monitor given no issues with hd \par pt to follow up in 3-4 months with repeat duplex\par

## 2020-10-13 NOTE — HISTORY OF PRESENT ILLNESS
[] : left radiocephalic fistula [FreeTextEntry1] : 84 yo male with history of esrd on hd presents for follow up of left upper extremity radial cephalic avf.  pt without any complaints \par

## 2020-10-13 NOTE — PHYSICAL EXAM
169.9 [Normal] : no acute distress, well-nourished, well developed, well appearing [Thrill] : thrill [Hand well perfused] : hand well perfused [Ulcer] : no ulcer [de-identified] :  intact

## 2020-10-15 DIAGNOSIS — E83.39 OTHER DISORDERS OF PHOSPHORUS METABOLISM: ICD-10-CM

## 2020-10-16 ENCOUNTER — RX RENEWAL (OUTPATIENT)
Age: 85
End: 2020-10-16

## 2020-11-10 ENCOUNTER — OUTPATIENT (OUTPATIENT)
Dept: OUTPATIENT SERVICES | Facility: HOSPITAL | Age: 85
LOS: 1 days | Discharge: HOME | End: 2020-11-10

## 2020-11-10 ENCOUNTER — EMERGENCY (EMERGENCY)
Facility: HOSPITAL | Age: 85
LOS: 1 days | Discharge: HOME | End: 2020-11-10
Attending: STUDENT IN AN ORGANIZED HEALTH CARE EDUCATION/TRAINING PROGRAM | Admitting: STUDENT IN AN ORGANIZED HEALTH CARE EDUCATION/TRAINING PROGRAM
Payer: MEDICARE

## 2020-11-10 VITALS
HEIGHT: 68 IN | SYSTOLIC BLOOD PRESSURE: 158 MMHG | TEMPERATURE: 98 F | HEART RATE: 95 BPM | OXYGEN SATURATION: 98 % | RESPIRATION RATE: 18 BRPM | DIASTOLIC BLOOD PRESSURE: 62 MMHG

## 2020-11-10 DIAGNOSIS — I77.0 ARTERIOVENOUS FISTULA, ACQUIRED: Chronic | ICD-10-CM

## 2020-11-10 LAB
BASE EXCESS BLDV CALC-SCNC: 3.3 MMOL/L — SIGNIFICANT CHANGE UP
BASOPHILS # BLD AUTO: 0.02 K/UL — SIGNIFICANT CHANGE UP (ref 0–0.2)
BASOPHILS NFR BLD AUTO: 0.3 % — SIGNIFICANT CHANGE UP (ref 0–2)
BLOOD GAS VENOUS - CREATININE: 9.67 MG/DL — HIGH (ref 0.5–1.3)
BLOOD GAS VENOUS - FIO2: 21 — SIGNIFICANT CHANGE UP
CHLORIDE BLDV-SCNC: 103 MMOL/L — SIGNIFICANT CHANGE UP (ref 96–108)
EOSINOPHIL # BLD AUTO: 0.23 K/UL — SIGNIFICANT CHANGE UP (ref 0–0.5)
EOSINOPHIL NFR BLD AUTO: 3.7 % — SIGNIFICANT CHANGE UP (ref 0–6)
GAS PNL BLDV: 141 MMOL/L — SIGNIFICANT CHANGE UP (ref 136–146)
GLUCOSE BLDV-MCNC: 88 MG/DL — SIGNIFICANT CHANGE UP (ref 70–99)
HCO3 BLDV-SCNC: 26 MMOL/L — SIGNIFICANT CHANGE UP (ref 20–27)
HCT VFR BLD CALC: 31.9 % — LOW (ref 39–50)
HCT VFR BLDV CALC: 30.6 % — LOW (ref 39–51)
HGB BLD-MCNC: 9.5 G/DL — LOW (ref 13–17)
HGB BLDV-MCNC: 9.9 G/DL — LOW (ref 13–17)
IMM GRANULOCYTES NFR BLD AUTO: 0.3 % — SIGNIFICANT CHANGE UP (ref 0–1.5)
LACTATE BLDV-MCNC: 1 MMOL/L — SIGNIFICANT CHANGE UP (ref 0.5–2)
LYMPHOCYTES # BLD AUTO: 2.13 K/UL — SIGNIFICANT CHANGE UP (ref 1–3.3)
LYMPHOCYTES # BLD AUTO: 34.1 % — SIGNIFICANT CHANGE UP (ref 13–44)
MCHC RBC-ENTMCNC: 27.3 PG — SIGNIFICANT CHANGE UP (ref 27–34)
MCHC RBC-ENTMCNC: 29.8 % — LOW (ref 32–36)
MCV RBC AUTO: 91.7 FL — SIGNIFICANT CHANGE UP (ref 80–100)
MONOCYTES # BLD AUTO: 0.64 K/UL — SIGNIFICANT CHANGE UP (ref 0–0.9)
MONOCYTES NFR BLD AUTO: 10.3 % — SIGNIFICANT CHANGE UP (ref 2–14)
NEUTROPHILS # BLD AUTO: 3.2 K/UL — SIGNIFICANT CHANGE UP (ref 1.8–7.4)
NEUTROPHILS NFR BLD AUTO: 51.3 % — SIGNIFICANT CHANGE UP (ref 43–77)
NRBC # FLD: 0 K/UL — SIGNIFICANT CHANGE UP (ref 0–0)
OB PNL STL: POSITIVE — SIGNIFICANT CHANGE UP
OB PNL STL: POSITIVE — SIGNIFICANT CHANGE UP
PCO2 BLDV: 60 MMHG — HIGH (ref 41–51)
PH BLDV: 7.31 PH — LOW (ref 7.32–7.43)
PLATELET # BLD AUTO: 212 K/UL — SIGNIFICANT CHANGE UP (ref 150–400)
PMV BLD: 10.3 FL — SIGNIFICANT CHANGE UP (ref 7–13)
PO2 BLDV: 30 MMHG — LOW (ref 35–40)
POTASSIUM BLDV-SCNC: 4 MMOL/L — SIGNIFICANT CHANGE UP (ref 3.4–4.5)
RBC # BLD: 3.48 M/UL — LOW (ref 4.2–5.8)
RBC # FLD: 13.7 % — SIGNIFICANT CHANGE UP (ref 10.3–14.5)
SAO2 % BLDV: 43.1 % — LOW (ref 60–85)
WBC # BLD: 6.24 K/UL — SIGNIFICANT CHANGE UP (ref 3.8–10.5)
WBC # FLD AUTO: 6.24 K/UL — SIGNIFICANT CHANGE UP (ref 3.8–10.5)

## 2020-11-10 PROCEDURE — 99285 EMERGENCY DEPT VISIT HI MDM: CPT | Mod: 25

## 2020-11-10 PROCEDURE — 93010 ELECTROCARDIOGRAM REPORT: CPT

## 2020-11-10 RX ORDER — PANTOPRAZOLE SODIUM 20 MG/1
40 TABLET, DELAYED RELEASE ORAL ONCE
Refills: 0 | Status: COMPLETED | OUTPATIENT
Start: 2020-11-10 | End: 2020-11-10

## 2020-11-10 NOTE — ED ADULT TRIAGE NOTE - CHIEF COMPLAINT QUOTE
hx ESRD with LAVF, M-W-F with last full session yesterday, sent in by MD at dialysis clinical for K of 6.3, denies CP/SOB/HA/dizziness/weakness, NAD noted, EKG to be completed

## 2020-11-10 NOTE — ED PROVIDER NOTE - PHYSICAL EXAMINATION
Gen: Awake, Alert, WD, WN, NAD  Head:  NC/AT  Eyes:  PERRL, EOMI, Conjunctiva pink, lids normal, no scleral icterus  ENT: OP clear, no exudates, no erythema, uvula midline,, moist mucus membranes  Neck: supple, nontender, no meningismus, no JVD, trachea midline  Cardiac/CV:  S1 S2, RRR, no M/G/R  Respiratory/Pulm:  CTAB, good air movement, normal resp effort, no wheezes/stridor/retractions/rales/rhonchi  Gastrointestinal/Abdomen:  Soft, nontender, nondistended, +BS, no rebound/guarding  Back:  no CVAT, no MLT  Ext:  warm, well perfused, moving all extremities spontaneously, trace peripheral edema, distal pulses intact left UE AVF + bruit   Skin: intact, no rash  Neuro:  AAOx3, sensation intact, motor 5/5 x 4 extremities, speech clear

## 2020-11-10 NOTE — ED PROVIDER NOTE - OBJECTIVE STATEMENT
86 y/o M with PMH HTN , DM, HLD, ESRD on HD last HD monday sent in for + FOBT and for elevated potassium of 6.3 pt states he has been feeling well he had HD yesterday denies dizziness, chest pain, SOB, abdominal pain. States he gets HD from [facility] has had HD since june of this year. States they did blood work and told him to come in to the hospital. He denies rectal blood in his stool or cough. He denies chest pain or syncope 84 y/o M with PMH HTN , DM, HLD, ESRD on HD last HD monday sent in for + FOBT and for elevated potassium of 6.3 pt states he has been feeling well he had HD yesterday denies dizziness, chest pain, SOB, abdominal pain. States he gets HD from [facility] has had HD since june of this year. States they did blood work and told him to come in to the hospital. He denies rectal blood in his stool or cough. He denies chest pain or syncope. Pt does endorse dark tarry stool for a week and was tested for fecal occult at his dialysis center.

## 2020-11-10 NOTE — ED ADULT NURSE NOTE - OBJECTIVE STATEMENT
86 y/o male arrives to E.D. rm 2 for abnormal lab values, denies any medical complaints. A&Ox4, ambulatory, neg SOB, respirations even & unlabored, denies chest pain/palpitations/dizziness, denies N/V/D, denies blood in stool. Pt states he went for HD yesterday and lab values revealed elevated K and a + occult blood in stool. L arm precautions in place. Pt on cardiac monitoring. R 20g IV placed, labs sent. Report given to primary RN. -Facilitator RN

## 2020-11-10 NOTE — ED PROVIDER NOTE - NSFOLLOWUPINSTRUCTIONS_ED_ALL_ED_FT
You are likely having a slow gastrointestinal bleed here given your black stools. You should see your primary care doctor immediately for further follow up regarding you stools. Please return to the emergency department immediately if you develop any weakness, shortness of breath, lightheadedness, fatigue or if you believe that your bleeding is getting worse at all. Your potassium is normal here.

## 2020-11-10 NOTE — ED PROVIDER NOTE - PROGRESS NOTE DETAILS
Emanuel Wynne MD: Pt continues to be asymptomatic. Potassium returned as normal here. Pt with dark tarry stool; however, he states that he would much prefer to go home unless we are planning an acute intervention here. Given that his bleeding is likely slow and chronic patient is unlikely to have an acute intervention. He states that he understands that there is a risk for worsening bleeding and that he will return if that should occur. Will discharge the patient.

## 2020-11-10 NOTE — ED PROVIDER NOTE - ATTENDING CONTRIBUTION TO CARE
attending wrote note  Dr. Allen: I have personally seen and examined this patient at the bedside. I have fully participated in the care of this patient. I have reviewed all pertinent clinical information, including history, physical exam, plan and the Resident's note and agree except as noted. HPI above as by me. PE above as by me. DDX PLAN

## 2020-11-10 NOTE — ED PROVIDER NOTE - PATIENT PORTAL LINK FT
You can access the FollowMyHealth Patient Portal offered by [facility] by registering at the following website: [email or web address]. By joining [facility]’s FollowMyHealth portal, you will also be able to view your health information using other applications (apps) compatible with our system.

## 2020-11-10 NOTE — ED PROVIDER NOTE - CLINICAL SUMMARY MEDICAL DECISION MAKING FREE TEXT BOX
86 y/o M with PMH HTN , DM, HLD, ESRD on HD last HD monday sent in for + FOBT and for elevated potassium of 6.3. pt reports feeling well was told to come to hospital , had elevated potassium and positive occult blood denies SOB, dizziness, bleeding, Will check ekg, cbc, cmp, pt/inr, vbg , reassess

## 2020-11-11 LAB
ALBUMIN SERPL ELPH-MCNC: 4.5 G/DL — SIGNIFICANT CHANGE UP (ref 3.3–5)
ALP SERPL-CCNC: 62 U/L — SIGNIFICANT CHANGE UP (ref 40–120)
ALT FLD-CCNC: 8 U/L — SIGNIFICANT CHANGE UP (ref 4–41)
ANION GAP SERPL CALC-SCNC: 16 MMO/L — HIGH (ref 7–14)
APTT BLD: 36.9 SEC — HIGH (ref 27–36.3)
AST SERPL-CCNC: 15 U/L — SIGNIFICANT CHANGE UP (ref 4–40)
BILIRUB SERPL-MCNC: < 0.2 MG/DL — LOW (ref 0.2–1.2)
BLD GP AB SCN SERPL QL: NEGATIVE — SIGNIFICANT CHANGE UP
BUN SERPL-MCNC: 44 MG/DL — HIGH (ref 7–23)
CALCIUM SERPL-MCNC: 9.5 MG/DL — SIGNIFICANT CHANGE UP (ref 8.4–10.5)
CHLORIDE SERPL-SCNC: 102 MMOL/L — SIGNIFICANT CHANGE UP (ref 98–107)
CO2 SERPL-SCNC: 24 MMOL/L — SIGNIFICANT CHANGE UP (ref 22–31)
CREAT SERPL-MCNC: 9.64 MG/DL — HIGH (ref 0.5–1.3)
GLUCOSE SERPL-MCNC: 94 MG/DL — SIGNIFICANT CHANGE UP (ref 70–99)
HGB BLD-MCNC: 8 G/DL — LOW (ref 13–17)
INR BLD: 0.96 — SIGNIFICANT CHANGE UP (ref 0.88–1.16)
MAGNESIUM SERPL-MCNC: 2.2 MG/DL — SIGNIFICANT CHANGE UP (ref 1.6–2.6)
PHOSPHATE SERPL-MCNC: 4.9 MG/DL — HIGH (ref 2.5–4.5)
POTASSIUM SERPL-MCNC: 4.3 MMOL/L — SIGNIFICANT CHANGE UP (ref 3.5–5.3)
POTASSIUM SERPL-SCNC: 4.3 MMOL/L — SIGNIFICANT CHANGE UP (ref 3.5–5.3)
PROT SERPL-MCNC: 7.3 G/DL — SIGNIFICANT CHANGE UP (ref 6–8.3)
PROTHROM AB SERPL-ACNC: 11 SEC — SIGNIFICANT CHANGE UP (ref 10.6–13.6)
RH IG SCN BLD-IMP: NEGATIVE — SIGNIFICANT CHANGE UP
SARS-COV-2 RNA SPEC QL NAA+PROBE: SIGNIFICANT CHANGE UP
SODIUM SERPL-SCNC: 142 MMOL/L — SIGNIFICANT CHANGE UP (ref 135–145)

## 2020-11-11 RX ADMIN — PANTOPRAZOLE SODIUM 40 MILLIGRAM(S): 20 TABLET, DELAYED RELEASE ORAL at 01:17

## 2020-11-13 LAB — HGB BLD-MCNC: 8.1 G/DL — LOW (ref 13–17)

## 2020-11-18 PROBLEM — N18.6 END STAGE RENAL DISEASE: Chronic | Status: ACTIVE | Noted: 2020-11-10

## 2020-12-04 LAB
POTASSIUM SERPL-MCNC: 5.5 MMOL/L — HIGH (ref 3.5–5.3)
POTASSIUM SERPL-SCNC: 5.5 MMOL/L — HIGH (ref 3.5–5.3)

## 2020-12-16 ENCOUNTER — RX RENEWAL (OUTPATIENT)
Age: 85
End: 2020-12-16

## 2021-01-04 PROCEDURE — 99285 EMERGENCY DEPT VISIT HI MDM: CPT

## 2021-01-04 PROCEDURE — 93010 ELECTROCARDIOGRAM REPORT: CPT

## 2021-01-13 ENCOUNTER — RX RENEWAL (OUTPATIENT)
Age: 86
End: 2021-01-13

## 2021-02-04 ENCOUNTER — INPATIENT (INPATIENT)
Facility: HOSPITAL | Age: 86
LOS: 1 days | Discharge: HOME | End: 2021-02-06
Attending: STUDENT IN AN ORGANIZED HEALTH CARE EDUCATION/TRAINING PROGRAM | Admitting: STUDENT IN AN ORGANIZED HEALTH CARE EDUCATION/TRAINING PROGRAM
Payer: MEDICARE

## 2021-02-04 VITALS
OXYGEN SATURATION: 100 % | HEART RATE: 91 BPM | RESPIRATION RATE: 16 BRPM | TEMPERATURE: 98 F | DIASTOLIC BLOOD PRESSURE: 74 MMHG | HEIGHT: 68 IN | SYSTOLIC BLOOD PRESSURE: 164 MMHG

## 2021-02-04 DIAGNOSIS — I77.0 ARTERIOVENOUS FISTULA, ACQUIRED: Chronic | ICD-10-CM

## 2021-02-04 NOTE — ED ADULT TRIAGE NOTE - CHIEF COMPLAINT QUOTE
Pt c/o sent in by PMD for elevated protein level. Pt is a M,T,F dialysis with left AV fistula. and last received full treatment yesterday. pt PMD told him to come to the ED due to protein level. Pt denies any medical complaints. No complaints of chest pain, headache, nausea, dizziness, vomiting  SOB, fever, chills verbalized.

## 2021-02-05 DIAGNOSIS — I10 ESSENTIAL (PRIMARY) HYPERTENSION: ICD-10-CM

## 2021-02-05 DIAGNOSIS — E87.5 HYPERKALEMIA: ICD-10-CM

## 2021-02-05 DIAGNOSIS — Z29.9 ENCOUNTER FOR PROPHYLACTIC MEASURES, UNSPECIFIED: ICD-10-CM

## 2021-02-05 DIAGNOSIS — E11.9 TYPE 2 DIABETES MELLITUS WITHOUT COMPLICATIONS: ICD-10-CM

## 2021-02-05 DIAGNOSIS — N18.6 END STAGE RENAL DISEASE: ICD-10-CM

## 2021-02-05 LAB
ALBUMIN SERPL ELPH-MCNC: 4.3 G/DL — SIGNIFICANT CHANGE UP (ref 3.3–5)
ALP SERPL-CCNC: 69 U/L — SIGNIFICANT CHANGE UP (ref 40–120)
ALT FLD-CCNC: 11 U/L — SIGNIFICANT CHANGE UP (ref 4–41)
ANION GAP SERPL CALC-SCNC: 17 MMOL/L — HIGH (ref 7–14)
ANION GAP SERPL CALC-SCNC: 20 MMOL/L — HIGH (ref 7–14)
AST SERPL-CCNC: 14 U/L — SIGNIFICANT CHANGE UP (ref 4–40)
BASOPHILS # BLD AUTO: 0.02 K/UL — SIGNIFICANT CHANGE UP (ref 0–0.2)
BASOPHILS NFR BLD AUTO: 0.3 % — SIGNIFICANT CHANGE UP (ref 0–2)
BILIRUB SERPL-MCNC: 0.2 MG/DL — SIGNIFICANT CHANGE UP (ref 0.2–1.2)
BLOOD GAS VENOUS COMPREHENSIVE RESULT: SIGNIFICANT CHANGE UP
BUN SERPL-MCNC: 44 MG/DL — HIGH (ref 7–23)
BUN SERPL-MCNC: 47 MG/DL — HIGH (ref 7–23)
CALCIUM SERPL-MCNC: 9.4 MG/DL — SIGNIFICANT CHANGE UP (ref 8.4–10.5)
CALCIUM SERPL-MCNC: 9.6 MG/DL — SIGNIFICANT CHANGE UP (ref 8.4–10.5)
CHLORIDE SERPL-SCNC: 101 MMOL/L — SIGNIFICANT CHANGE UP (ref 98–107)
CHLORIDE SERPL-SCNC: 97 MMOL/L — LOW (ref 98–107)
CO2 SERPL-SCNC: 25 MMOL/L — SIGNIFICANT CHANGE UP (ref 22–31)
CO2 SERPL-SCNC: 27 MMOL/L — SIGNIFICANT CHANGE UP (ref 22–31)
CREAT SERPL-MCNC: 13.34 MG/DL — HIGH (ref 0.5–1.3)
CREAT SERPL-MCNC: 13.67 MG/DL — HIGH (ref 0.5–1.3)
DIALYSIS INSTRUMENT RESULT - HEPATITIS B SURFACE ANTIGEN: NEGATIVE — SIGNIFICANT CHANGE UP
EOSINOPHIL # BLD AUTO: 0.24 K/UL — SIGNIFICANT CHANGE UP (ref 0–0.5)
EOSINOPHIL NFR BLD AUTO: 3.5 % — SIGNIFICANT CHANGE UP (ref 0–6)
GLUCOSE BLDC GLUCOMTR-MCNC: 107 MG/DL — HIGH (ref 70–99)
GLUCOSE BLDC GLUCOMTR-MCNC: 127 MG/DL — HIGH (ref 70–99)
GLUCOSE BLDC GLUCOMTR-MCNC: 151 MG/DL — HIGH (ref 70–99)
GLUCOSE BLDC GLUCOMTR-MCNC: 162 MG/DL — HIGH (ref 70–99)
GLUCOSE BLDC GLUCOMTR-MCNC: 167 MG/DL — HIGH (ref 70–99)
GLUCOSE BLDC GLUCOMTR-MCNC: 228 MG/DL — HIGH (ref 70–99)
GLUCOSE BLDC GLUCOMTR-MCNC: 36 MG/DL — CRITICAL LOW (ref 70–99)
GLUCOSE BLDC GLUCOMTR-MCNC: 380 MG/DL — HIGH (ref 70–99)
GLUCOSE BLDC GLUCOMTR-MCNC: 50 MG/DL — CRITICAL LOW (ref 70–99)
GLUCOSE BLDC GLUCOMTR-MCNC: 54 MG/DL — CRITICAL LOW (ref 70–99)
GLUCOSE BLDC GLUCOMTR-MCNC: 68 MG/DL — LOW (ref 70–99)
GLUCOSE BLDC GLUCOMTR-MCNC: 72 MG/DL — SIGNIFICANT CHANGE UP (ref 70–99)
GLUCOSE BLDC GLUCOMTR-MCNC: 89 MG/DL — SIGNIFICANT CHANGE UP (ref 70–99)
GLUCOSE BLDC GLUCOMTR-MCNC: 90 MG/DL — SIGNIFICANT CHANGE UP (ref 70–99)
GLUCOSE BLDC GLUCOMTR-MCNC: 93 MG/DL — SIGNIFICANT CHANGE UP (ref 70–99)
GLUCOSE BLDC GLUCOMTR-MCNC: <25 MG/DL — CRITICAL LOW (ref 70–99)
GLUCOSE SERPL-MCNC: 113 MG/DL — HIGH (ref 70–99)
GLUCOSE SERPL-MCNC: 86 MG/DL — SIGNIFICANT CHANGE UP (ref 70–99)
HCT VFR BLD CALC: 48.5 % — SIGNIFICANT CHANGE UP (ref 39–50)
HGB BLD-MCNC: 14.1 G/DL — SIGNIFICANT CHANGE UP (ref 13–17)
IANC: 3.88 K/UL — SIGNIFICANT CHANGE UP (ref 1.5–8.5)
IMM GRANULOCYTES NFR BLD AUTO: 0.1 % — SIGNIFICANT CHANGE UP (ref 0–1.5)
LYMPHOCYTES # BLD AUTO: 1.94 K/UL — SIGNIFICANT CHANGE UP (ref 1–3.3)
LYMPHOCYTES # BLD AUTO: 28.6 % — SIGNIFICANT CHANGE UP (ref 13–44)
MAGNESIUM SERPL-MCNC: 2.6 MG/DL — SIGNIFICANT CHANGE UP (ref 1.6–2.6)
MCHC RBC-ENTMCNC: 26.5 PG — LOW (ref 27–34)
MCHC RBC-ENTMCNC: 29.1 GM/DL — LOW (ref 32–36)
MCV RBC AUTO: 91.2 FL — SIGNIFICANT CHANGE UP (ref 80–100)
MONOCYTES # BLD AUTO: 0.69 K/UL — SIGNIFICANT CHANGE UP (ref 0–0.9)
MONOCYTES NFR BLD AUTO: 10.2 % — SIGNIFICANT CHANGE UP (ref 2–14)
NEUTROPHILS # BLD AUTO: 3.88 K/UL — SIGNIFICANT CHANGE UP (ref 1.8–7.4)
NEUTROPHILS NFR BLD AUTO: 57.3 % — SIGNIFICANT CHANGE UP (ref 43–77)
NRBC # BLD: 0 /100 WBCS — SIGNIFICANT CHANGE UP
NRBC # FLD: 0 K/UL — SIGNIFICANT CHANGE UP
PHOSPHATE SERPL-MCNC: 6.1 MG/DL — HIGH (ref 2.5–4.5)
PLATELET # BLD AUTO: 205 K/UL — SIGNIFICANT CHANGE UP (ref 150–400)
POTASSIUM SERPL-MCNC: 4.2 MMOL/L — SIGNIFICANT CHANGE UP (ref 3.5–5.3)
POTASSIUM SERPL-MCNC: 6.1 MMOL/L — HIGH (ref 3.5–5.3)
POTASSIUM SERPL-SCNC: 4.2 MMOL/L — SIGNIFICANT CHANGE UP (ref 3.5–5.3)
POTASSIUM SERPL-SCNC: 6.1 MMOL/L — HIGH (ref 3.5–5.3)
PROT SERPL-MCNC: 7.9 G/DL — SIGNIFICANT CHANGE UP (ref 6–8.3)
RBC # BLD: 5.32 M/UL — SIGNIFICANT CHANGE UP (ref 4.2–5.8)
RBC # FLD: 14.9 % — HIGH (ref 10.3–14.5)
SARS-COV-2 RNA SPEC QL NAA+PROBE: SIGNIFICANT CHANGE UP
SODIUM SERPL-SCNC: 141 MMOL/L — SIGNIFICANT CHANGE UP (ref 135–145)
SODIUM SERPL-SCNC: 146 MMOL/L — HIGH (ref 135–145)
WBC # BLD: 6.78 K/UL — SIGNIFICANT CHANGE UP (ref 3.8–10.5)
WBC # FLD AUTO: 6.78 K/UL — SIGNIFICANT CHANGE UP (ref 3.8–10.5)

## 2021-02-05 PROCEDURE — 99223 1ST HOSP IP/OBS HIGH 75: CPT | Mod: GC

## 2021-02-05 PROCEDURE — 99223 1ST HOSP IP/OBS HIGH 75: CPT

## 2021-02-05 RX ORDER — DEXTROSE 50 % IN WATER 50 %
25 SYRINGE (ML) INTRAVENOUS ONCE
Refills: 0 | Status: DISCONTINUED | OUTPATIENT
Start: 2021-02-05 | End: 2021-02-06

## 2021-02-05 RX ORDER — SODIUM BICARBONATE 1 MEQ/ML
50 SYRINGE (ML) INTRAVENOUS ONCE
Refills: 0 | Status: COMPLETED | OUTPATIENT
Start: 2021-02-05 | End: 2021-02-05

## 2021-02-05 RX ORDER — SODIUM ZIRCONIUM CYCLOSILICATE 10 G/10G
10 POWDER, FOR SUSPENSION ORAL ONCE
Refills: 0 | Status: COMPLETED | OUTPATIENT
Start: 2021-02-05 | End: 2021-02-05

## 2021-02-05 RX ORDER — SODIUM CHLORIDE 9 MG/ML
1000 INJECTION, SOLUTION INTRAVENOUS
Refills: 0 | Status: DISCONTINUED | OUTPATIENT
Start: 2021-02-05 | End: 2021-02-05

## 2021-02-05 RX ORDER — AMLODIPINE BESYLATE 2.5 MG/1
10 TABLET ORAL DAILY
Refills: 0 | Status: DISCONTINUED | OUTPATIENT
Start: 2021-02-05 | End: 2021-02-06

## 2021-02-05 RX ORDER — DEXTROSE 50 % IN WATER 50 %
12.5 SYRINGE (ML) INTRAVENOUS ONCE
Refills: 0 | Status: COMPLETED | OUTPATIENT
Start: 2021-02-05 | End: 2021-02-05

## 2021-02-05 RX ORDER — HEPARIN SODIUM 5000 [USP'U]/ML
5000 INJECTION INTRAVENOUS; SUBCUTANEOUS EVERY 8 HOURS
Refills: 0 | Status: DISCONTINUED | OUTPATIENT
Start: 2021-02-05 | End: 2021-02-06

## 2021-02-05 RX ORDER — CALCIUM ACETATE 667 MG
667 TABLET ORAL
Refills: 0 | Status: DISCONTINUED | OUTPATIENT
Start: 2021-02-05 | End: 2021-02-06

## 2021-02-05 RX ORDER — SIMVASTATIN 20 MG/1
20 TABLET, FILM COATED ORAL AT BEDTIME
Refills: 0 | Status: DISCONTINUED | OUTPATIENT
Start: 2021-02-05 | End: 2021-02-06

## 2021-02-05 RX ORDER — DEXTROSE 50 % IN WATER 50 %
15 SYRINGE (ML) INTRAVENOUS ONCE
Refills: 0 | Status: DISCONTINUED | OUTPATIENT
Start: 2021-02-05 | End: 2021-02-06

## 2021-02-05 RX ORDER — CHLORHEXIDINE GLUCONATE 213 G/1000ML
1 SOLUTION TOPICAL DAILY
Refills: 0 | Status: DISCONTINUED | OUTPATIENT
Start: 2021-02-05 | End: 2021-02-06

## 2021-02-05 RX ORDER — GLUCAGON INJECTION, SOLUTION 0.5 MG/.1ML
1 INJECTION, SOLUTION SUBCUTANEOUS ONCE
Refills: 0 | Status: DISCONTINUED | OUTPATIENT
Start: 2021-02-05 | End: 2021-02-06

## 2021-02-05 RX ORDER — SODIUM CHLORIDE 9 MG/ML
1000 INJECTION, SOLUTION INTRAVENOUS
Refills: 0 | Status: DISCONTINUED | OUTPATIENT
Start: 2021-02-05 | End: 2021-02-06

## 2021-02-05 RX ORDER — CALCIUM GLUCONATE 100 MG/ML
1 VIAL (ML) INTRAVENOUS ONCE
Refills: 0 | Status: COMPLETED | OUTPATIENT
Start: 2021-02-05 | End: 2021-02-05

## 2021-02-05 RX ORDER — DEXTROSE 50 % IN WATER 50 %
50 SYRINGE (ML) INTRAVENOUS ONCE
Refills: 0 | Status: COMPLETED | OUTPATIENT
Start: 2021-02-05 | End: 2021-02-05

## 2021-02-05 RX ORDER — INFLUENZA VIRUS VACCINE 15; 15; 15; 15 UG/.5ML; UG/.5ML; UG/.5ML; UG/.5ML
0.7 SUSPENSION INTRAMUSCULAR ONCE
Refills: 0 | Status: DISCONTINUED | OUTPATIENT
Start: 2021-02-05 | End: 2021-02-06

## 2021-02-05 RX ORDER — FOLIC ACID 0.8 MG
1 TABLET ORAL DAILY
Refills: 0 | Status: DISCONTINUED | OUTPATIENT
Start: 2021-02-05 | End: 2021-02-06

## 2021-02-05 RX ORDER — INFLUENZA VIRUS VACCINE 15; 15; 15; 15 UG/.5ML; UG/.5ML; UG/.5ML; UG/.5ML
0.5 SUSPENSION INTRAMUSCULAR ONCE
Refills: 0 | Status: DISCONTINUED | OUTPATIENT
Start: 2021-02-05 | End: 2021-02-05

## 2021-02-05 RX ORDER — CALCIUM ACETATE 667 MG
3 TABLET ORAL
Qty: 0 | Refills: 0 | DISCHARGE

## 2021-02-05 RX ORDER — DEXTROSE 50 % IN WATER 50 %
12.5 SYRINGE (ML) INTRAVENOUS ONCE
Refills: 0 | Status: DISCONTINUED | OUTPATIENT
Start: 2021-02-05 | End: 2021-02-06

## 2021-02-05 RX ORDER — INSULIN HUMAN 100 [IU]/ML
5 INJECTION, SOLUTION SUBCUTANEOUS ONCE
Refills: 0 | Status: COMPLETED | OUTPATIENT
Start: 2021-02-05 | End: 2021-02-05

## 2021-02-05 RX ADMIN — Medication 667 MILLIGRAM(S): at 12:08

## 2021-02-05 RX ADMIN — Medication 50 MILLILITER(S): at 05:30

## 2021-02-05 RX ADMIN — SODIUM ZIRCONIUM CYCLOSILICATE 10 GRAM(S): 10 POWDER, FOR SUSPENSION ORAL at 06:01

## 2021-02-05 RX ADMIN — INSULIN HUMAN 5 UNIT(S): 100 INJECTION, SOLUTION SUBCUTANEOUS at 03:45

## 2021-02-05 RX ADMIN — Medication 100 GRAM(S): at 03:45

## 2021-02-05 RX ADMIN — Medication 12.5 GRAM(S): at 10:48

## 2021-02-05 RX ADMIN — Medication 1 MILLIGRAM(S): at 12:08

## 2021-02-05 RX ADMIN — Medication 50 MILLIEQUIVALENT(S): at 03:40

## 2021-02-05 RX ADMIN — SODIUM CHLORIDE 100 MILLILITER(S): 9 INJECTION, SOLUTION INTRAVENOUS at 07:25

## 2021-02-05 RX ADMIN — Medication 50 MILLILITER(S): at 03:40

## 2021-02-05 RX ADMIN — Medication 50 MILLILITER(S): at 07:13

## 2021-02-05 RX ADMIN — SIMVASTATIN 20 MILLIGRAM(S): 20 TABLET, FILM COATED ORAL at 20:59

## 2021-02-05 RX ADMIN — Medication 50 MILLILITER(S): at 05:25

## 2021-02-05 NOTE — CONSULT NOTE ADULT - PROBLEM SELECTOR RECOMMENDATION 9
Pt. with mild hyperkalemia in setting of ESRD and dietary noncompliance.  Serum potassium upon admission was 6.1. Pt received medical management for hyperkalemia. Will plan for HD today with low potassium bath. Will send for recirculation study. Low potassium diet advised. Monitor serum potassium

## 2021-02-05 NOTE — H&P ADULT - NEGATIVE MUSCULOSKELETAL SYMPTOMS
no arthralgia/no joint swelling/no muscle weakness/no neck pain/no back pain/no leg pain L/no leg pain R

## 2021-02-05 NOTE — H&P ADULT - PROBLEM SELECTOR PLAN 2
low salt, low fat, low cholesterol   norvasc, lasix low salt, low fat, low cholesterol   continue norvasc, lasix

## 2021-02-05 NOTE — H&P ADULT - RS GEN PE MLT RESP DETAILS PC
normal/airway patent/breath sounds equal/good air movement/respirations non-labored/clear to auscultation bilaterally/no chest wall tenderness/no intercostal retractions/no rales/no rhonchi/no wheezes

## 2021-02-05 NOTE — H&P ADULT - ATTENDING COMMENTS
86M h/o ESRD on HD, sent in to ED after found to be hyperkalemic. Initial ECG with T wave peaking, given calcium gluconate, IV insulin. Nephro consulted and patient to undergo HD today. Course c/b hypoglycemia which may be result of IV insulin administered for hyperkalemia protocol; patient also is not on renally-dosed Januvia (he is taking twice the dose he should be taking). Will c/w IV D5 given recurrent hypoglycemia and monitor FS glucose. Checking A1c with AM labs. Can consider endocrine consultation if A1c markedly above goal or FSG becomes difficult to manage.    Also obtaining nutrition consult to assess/educate for any dietary contributions to hyperkalemia.

## 2021-02-05 NOTE — ED PROVIDER NOTE - PROGRESS NOTE DETAILS
K+ back at 6.1 not hemolyzed. Pts nephrologist Dr Jorje Taylor on call colleague aware. Will admit to hospitalist for HD later today since pt is not due to HD until 15 hours from now. Pt amenable. Will treat HyperK with meds here in ED. Pt has no complaints. Pt was found to be diaphoretic and minimally responsive, fingerstick was checked immediately and was read LOW, given 2 amps D50 push and pt returned to baseline MS. Will provide PO food and closely monitor. Repeat . Cause of his acute condition most likely secondary to insulin given for his HyperK cocktail and renal dysfunction despite giving pt only half of weight based dose of insulin. Repeat , will continue to monitor and repeat Fingerstick in 15 mins. Repeat FS 37, pt AOx3, mentating well. At this time will start D51/2 NS drip for glucose control. MAR paged.

## 2021-02-05 NOTE — CONSULT NOTE ADULT - PROBLEM SELECTOR RECOMMENDATION 3
BP above target range. Resume home BP medications. Will plan for HD today with UF 2L as tolerated. Low salt diet.    Case discussed with Nephrology Attending On Call Dr. Herrera  If you have any questions, please feel free to contact me  Waldemar Lemus  Nephrology Fellow  Pager: [phone] / [phone]  (After 5pm or on weekends please page the on-call fellow)

## 2021-02-05 NOTE — H&P ADULT - NEGATIVE GASTROINTESTINAL SYMPTOMS
no nausea/no vomiting/no diarrhea/no change in bowel habits/no abdominal pain/no melena/no hematochezia/no jaundice

## 2021-02-05 NOTE — H&P ADULT - ASSESSMENT
87 yo M with PMH HTN, DM, HLD, ESRD on HD last HD Wed, sent by PMD for "elevated potassium" being admitted to tele for monitoring and HD.

## 2021-02-05 NOTE — H&P ADULT - NEGATIVE GENERAL SYMPTOMS
no fever/no chills/no sweating/no anorexia/no weight loss/no weight gain/no polyuria/no polydipsia/no malaise/no fatigue

## 2021-02-05 NOTE — ED PROVIDER NOTE - OBJECTIVE STATEMENT
85 yo M with Past Medical History of HTN , DM, HLD, ESRD on HD last HD Wed, sent by PMD for "elevated protein". Per pt, he went to HD, full course, then had labs taken, has been well, got call tonight for him to come to ED for "elevated protein". When asked about if it was "potassium" he says maybe. Denies any symptoms such as fever, chills, chest pain, SOB, abd pain, N/V/D, weakness, paresthesias. He does report however that he has been having night sweats occasionally and weight loss but weight loss has been since starting HD last July 2020. Night sweats are recently but did not have it last two nights. No cough, funny nose or other symptoms. Non smoker.

## 2021-02-05 NOTE — PROVIDER CONTACT NOTE (OTHER) - SITUATION
Patient hypoglycemic repeat FS 54
Patient hypoglycemic, blood glucose remains low despite providers order of apple juice PO and D5 running from previous hypoglycemic episode

## 2021-02-05 NOTE — CONSULT NOTE ADULT - SUBJECTIVE AND OBJECTIVE BOX
[facility] Division of Kidney Diseases & Hypertension  INITIAL CONSULT NOTE  [phone]--------------------------------------------------------------------------------  HPI: 86 year old M with Past Medical History of HTN , DM, HLD, ESRD on HD was sent from [facility] for elevated serum potassium on monthly labs. Nephrology team consulted for history of ESRD/HD management.    Patient with history of ESRD on HD since July 2020. Pt reports ESRD is secondary to DM and HTN. Pt goes to [facility] and is being seen by nephrologist Dr. Jorje Abreu. Pt is on HD TIW every MWF with last HD on Wednesday 2/3/21 via LUE AVF. Pt found  to have hyperkalemia on monthly labs with a serum K of 7.6 and was advised to go to the ED. On admission, pt with serum potassium 6.1 non-hemolyzed. Pt was treated with Insulin/D50, Lokelma 10gm, Calcium Gluc 1 gm with Sodium bicarbonate 50meq IV push. Pt seen and examined in the ED. Pt reports that he has been eating excessively the past week and has been noncompliant with his diet. Denies any symptoms such as fever, chills, chest pain, SOB, abd pain, N/V/D, weakness, paresthesias. Pt still reports making urine.     PAST HISTORY  --------------------------------------------------------------------------------  PAST MEDICAL & SURGICAL HISTORY:  ESRD (end stage renal disease)    HTN (hypertension)    Diabetes    AVF (arteriovenous fistula)      FAMILY HISTORY:    PAST SOCIAL HISTORY:    ALLERGIES & MEDICATIONS  --------------------------------------------------------------------------------  Allergies    No Known Allergies    Intolerances      Standing Inpatient Medications  sodium zirconium cyclosilicate 10 Gram(s) Oral Once    PRN Inpatient Medications      REVIEW OF SYSTEMS  --------------------------------------------------------------------------------  Gen: No  fevers/chills, weakness  Skin: No rashes  Head/Eyes/Ears/Mouth: No headache; Normal hearing; Normal vision w/o blurriness;   Respiratory: No dyspnea, cough, wheezing, hemoptysis  CV: No chest pain,   GI: No abdominal pain, diarrhea, constipation, nausea, vomiting  : No increased frequency, dysuria, hematuria, nocturia  MSK: No joint pain/swelling;   Neuro: No dizziness/lightheadedness, weakness, seizures    All other systems were reviewed and are negative, except as noted.    VITALS/PHYSICAL EXAM  --------------------------------------------------------------------------------  T(C): 36.4 (02-05-21 @ 01:37), Max: 36.7 (02-04-21 @ 21:00)  HR: 76 (02-05-21 @ 01:37) (76 - 91)  BP: 181/66 (02-05-21 @ 01:37) (158/77 - 181/66)  RR: 16 (02-05-21 @ 01:37) (16 - 16)  SpO2: 100% (02-05-21 @ 01:37) (100% - 100%)  Wt(kg): --  Height (cm): 172.7 (02-04-21 @ 21:00)      Physical Exam:  	Gen: NAD, well-appearing  	HEENT: PERRL, supple neck  	Pulm: CTA B/L  	CV:  S1S2  	Abd: +BS, soft   	Ext: No B/L Lower ext edema  	Neuro: No focal deficits  	Skin: Warm, without rashes  	Vascular access: LUE AVF + thrill/bruit    LABS/STUDIES  --------------------------------------------------------------------------------              14.1   6.78  >-----------<  205      [02-05-21 @ 02:12]              48.5     141  |  97  |  44  ----------------------------<  86      [02-05-21 @ 02:12]  6.1   |  27  |  13.34        Ca     9.6     [02-05-21 @ 02:12]      Mg     2.6     [02-05-21 @ 02:12]      Phos  6.1     [02-05-21 @ 02:12]    TPro  7.9  /  Alb  4.3  /  TBili  0.2  /  DBili  x   /  AST  14  /  ALT  11  /  AlkPhos  69  [02-05-21 @ 02:12]      Creatinine Trend:  SCr 13.34 [02-05 @ 02:12]

## 2021-02-05 NOTE — PROVIDER CONTACT NOTE (OTHER) - BACKGROUND
Patient admitted fo hyperkalemia, pmhx ESRD, HTN, diabetes
Patient admitted for hyperkalemia, pmhx esrd, htn, diabetes

## 2021-02-05 NOTE — ED ADULT NURSE NOTE - NSIMPLEMENTINTERV_GEN_ALL_ED
Implemented All Universal Safety Interventions:  [place] to call system. Call bell, personal items and telephone within reach. Instruct patient to call for assistance. Room bathroom lighting operational. Non-slip footwear when patient is off stretcher. Physically safe environment: no spills, clutter or unnecessary equipment. Stretcher in lowest position, wheels locked, appropriate side rails in place.

## 2021-02-05 NOTE — CONSULT NOTE ADULT - PROBLEM SELECTOR RECOMMENDATION 2
Pt. with ESRD on HD three times a week (MWF). Last HD was on 2/3/21 via LUE AVF. EDW : 65 kg outpatient. Pt with hyperkalemia on admission. Will arrange for maintenance HD today. Consent obtained from patient and placed on chart. Monitor labs

## 2021-02-05 NOTE — H&P ADULT - NEGATIVE NEUROLOGICAL SYMPTOMS
no transient paralysis/no weakness/no paresthesias/no generalized seizures/no focal seizures/no syncope/no tremors/no vertigo/no loss of sensation/no headache/no loss of consciousness/no hemiparesis/no confusion/no facial palsy

## 2021-02-05 NOTE — H&P ADULT - HISTORY OF PRESENT ILLNESS
85 yo M with PMH HTN, DM, HLD, ESRD on HD last HD Wed, sent by PMD for "elevated potassium ". He has been having diaphoresis occasionally and weight loss, but weight loss has been since starting HD last July 2020. Patient without any complaints. NO chest pain, SOB at rest, PARKER, PND, orthopnea, palpitations, diaphoresis, lightheadedness, dizziness, syncope, increased lower extremity edema, fever chills, malaise, myalgias, anorexia, weight changes ( loss or gain), night sweats, generalized fatigue, abdominal pain, N/V/C/D BRBPR, melena, urinary symptoms, cough, and wheezing.  87 yo M with PMH HTN, DM, HLD, ESRD on HD last HD Wed, sent by PMD for "elevated potassium". He has been having diaphoresis occasionally and weight loss, but weight loss has been since starting HD last July 2020. Reports occasional constipation x 6 months. Last BM was yesterday and was normal, without blood. Patient without any complaints. NO abdominal pain, N/V/D BRBPR, melena, urinary symptoms, chest pain, SOB at rest, PARKER, PND, orthopnea, palpitations, diaphoresis, lightheadedness, dizziness, syncope, increased lower extremity edema, fever chills, malaise, myalgias, anorexia, generalized fatigue, cough, and wheezing.

## 2021-02-05 NOTE — H&P ADULT - PROBLEM SELECTOR PLAN 1
Hyperkalemia 2/2/ ESRD  K=6.1--> 4.2 ( tx with sodium bicarb, calcium gluconate, lokelma and insulin with glucose)  Renal following for HD  CXR: P  Ekg: SR @ 77 bpm Q V1- V2  Case to be discussed with hospitalist Hyperkalemia 2/2/ ESRD  K=6.1--> 4.2 (tx with sodium bicarb, calcium gluconate, lokelma and insulin with glucose), continue monitoring K  Renal following for HD  CXR: P  Ekg: SR @ 77 bpm Q V1- V2  Case to be discussed with hospitalist  Neuro checks Hyperkalemia 2/2/ ESRD  admit to tele monitor  K=6.1--> 4.2 (tx with sodium bicarb, calcium gluconate, lokelma and insulin with glucose), continue monitoring K  Renal following for HD  CXR: P  Ekg: SR @ 77 bpm Q V1- V2  Case to be discussed with hospitalist  Neuro checks

## 2021-02-05 NOTE — ED ADULT NURSE NOTE - OBJECTIVE STATEMENT
Pt presents to rm 4, alert&orientedx4, ambulatory, pmhx ESRD (M,W,F dialysis), last completed Wednesday, no complications coming to ED sent from PMD for abnomal labs, elevated protein. Pt offers no complaints at this time, denies any chest pain, SOB, no extremity swelling observed, Left AV fistula left arm band precautions applied. Pt appears in no acute distress, breathing even and non-labored, 20g IV established on right forear, NSR on cardiac monitor. Will endorse to primary RN.

## 2021-02-05 NOTE — H&P ADULT - MUSCULOSKELETAL
normal/ROM intact/no joint swelling/no joint erythema/no calf tenderness/normal strength details… detailed exam

## 2021-02-05 NOTE — ED PROVIDER NOTE - CLINICAL SUMMARY MEDICAL DECISION MAKING FREE TEXT BOX
87 yo M with Past Medical History of HTN , DM, HLD, ESRD on HD last HD Wed, sent by PMD for "elevated protein". Per pt, he went to HD, full course, then had labs taken, has been well, got call tonight for him to come to ED for "elevated protein" but most likely call was for elevated POTASSIUM. Will obtain labs and EKG and monitor. Pt has no current complaints except for recurrent night sweats which is possibly due to unknown/undiagnosed malignancy. Will reassess.

## 2021-02-05 NOTE — PROVIDER CONTACT NOTE (OTHER) - ASSESSMENT
Patient hypoglycemic, blood glucose remains low despite providers order of apple juice PO and D5 running from previous hypoglycemic episode, no acute distress or hypoglycemic s/s noted at this time
Patient in no acute at this time, fingerstick 54, not displaying any symptoms of hypoglycemia at this time, D5 running

## 2021-02-05 NOTE — ED ADULT NURSE NOTE - [facility] ED NUR SEVERITY2
Date of Service: 01/13/2020    PROCEDURE:  Consultation.    PREPROCEDURE DIAGNOSES:  1.  Post-laminectomy syndrome (lumbar).  2.  Low back pain with radicular pain.    HISTORY OF PRESENT ILLNESS:  The patient is a very pleasant 57-year-old female seen for evaluation and treatment of low back pain and radicular pain.  Her pain is primarily controlled with a spinal cord stimulator device.  Her stimulator is functioning well.  Reprogramming is not required.    The patient is using low-dose narcotic analgesics for breakthrough pain control.    PRESENT REGIMEN INCLUDES:  1.  MS Contin 15 mg 1 tablet p.o. q.a.m.  2.  Hydrocodone/APAP (5/325) on a p.r.n. basis.  She is using a maximum of 3 per day.    The patient is doing extremely well.  She feels her pain is under better control after her stimulator leads were modified.  She is using less narcotic than in the past.  We will continue MS Contin at 15 mg p.o. q.a.m.  We will decrease her Hydrocodone/APAP (5/325) to a total of 60 tablets per month.  She will use these on a p.r.n. basis using a maximum of 3-4 per day on bad days.  On many days she does not use any medications.  She is quite pleased with this.  She is considering decreasing her total requirements of Hydrocodone to 30 tablets per month in the future.  We will evaluate after she has trialed this for 1-2 months at 60 tablets per month.  The patient understands and agrees.  She was discharged home in excellent condition.      Dictated By: Alan Arzola MD  Signing Provider: MD AREN Leger/ARIELLA ([id number])  DD: 01/13/2020 10:03:22 TD: 01/13/2020 10:14:52    Copy Sent To:   
Date of Service: 2020    ADDENDUM    HISTORY OF PRESENT ILLNESS:  The patient is a pleasant 57-year-old female seen for evaluation and treatment of post-laminectomy syndrome (lumbar) with chronic low back pain and lumbar radicular pain.  Her pain control is consistent with the followin.  Spinal cord stimulator therapy.  This is functioning well.  2.  Very low-dose long-acting narcotic (MS Contin).  The patient uses 15 mg p.o. q.a.m.  She does not use it in the evening.  She does not have trouble sleeping.  3.  Hydrocodone/APAP (5/325) used on a p.r.n. basis for breakthrough pain.  She typically uses only 2-3 on active days.  Other days she uses none.    The patient will attempt to decrease her Hydrocodone/APAP (5/325) used.  We will decrease to an average of 2 per day and hopefully wean to 1 per day over the next several months.    The patient has a signed agreement regarding narcotics and we have assessed the risk of drug addiction.    We have a plan to manage her pain as outlined above.      We have addressed diversion in the past at her most recent appointment.    This will hopefully fulfill the requirements of her provider.  The patient has been on this medication long-term and uses it judiciously.  She has never asked for medications early.  She typically uses less than she is allowed for breakthrough pain medication.  She is doing extremely well and functional at work.  A combination with very low-dose long-acting agent and occasional breakthrough pain medication is quite appropriate and should be approved.    Thank you for your consideration.      Dictated By: Alan Arzola MD  Signing Provider: Alan Arzola MD    RL/msc ([id number])  DD: 2020 16:08:23 TD: 2020 16:15:55    Copy Sent To:   
PAIN SCALE 10 OF 10.

## 2021-02-06 ENCOUNTER — TRANSCRIPTION ENCOUNTER (OUTPATIENT)
Age: 86
End: 2021-02-06

## 2021-02-06 VITALS
SYSTOLIC BLOOD PRESSURE: 163 MMHG | OXYGEN SATURATION: 97 % | DIASTOLIC BLOOD PRESSURE: 67 MMHG | RESPIRATION RATE: 18 BRPM | TEMPERATURE: 98 F | HEART RATE: 77 BPM

## 2021-02-06 LAB
A1C WITH ESTIMATED AVERAGE GLUCOSE RESULT: 4.9 % — SIGNIFICANT CHANGE UP (ref 4–5.6)
A1C WITH ESTIMATED AVERAGE GLUCOSE RESULT: 4.9 % — SIGNIFICANT CHANGE UP (ref 4–5.6)
ANION GAP SERPL CALC-SCNC: 13 MMOL/L — SIGNIFICANT CHANGE UP (ref 7–14)
BUN SERPL-MCNC: 30 MG/DL — HIGH (ref 7–23)
CALCIUM SERPL-MCNC: 8.7 MG/DL — SIGNIFICANT CHANGE UP (ref 8.4–10.5)
CHLORIDE SERPL-SCNC: 96 MMOL/L — LOW (ref 98–107)
CO2 SERPL-SCNC: 29 MMOL/L — SIGNIFICANT CHANGE UP (ref 22–31)
CREAT SERPL-MCNC: 9.7 MG/DL — HIGH (ref 0.5–1.3)
ESTIMATED AVERAGE GLUCOSE: 94 MG/DL — SIGNIFICANT CHANGE UP (ref 68–114)
ESTIMATED AVERAGE GLUCOSE: 94 MG/DL — SIGNIFICANT CHANGE UP (ref 68–114)
GLUCOSE BLDC GLUCOMTR-MCNC: 82 MG/DL — SIGNIFICANT CHANGE UP (ref 70–99)
GLUCOSE BLDC GLUCOMTR-MCNC: 85 MG/DL — SIGNIFICANT CHANGE UP (ref 70–99)
GLUCOSE SERPL-MCNC: 96 MG/DL — SIGNIFICANT CHANGE UP (ref 70–99)
HBV CORE AB SER-ACNC: SIGNIFICANT CHANGE UP
HBV SURFACE AB SER-ACNC: 208.8 MIU/ML — SIGNIFICANT CHANGE UP
HCT VFR BLD CALC: 44 % — SIGNIFICANT CHANGE UP (ref 39–50)
HCV AB S/CO SERPL IA: 0.07 S/CO — SIGNIFICANT CHANGE UP (ref 0–0.99)
HCV AB SERPL-IMP: SIGNIFICANT CHANGE UP
HGB BLD-MCNC: 13.4 G/DL — SIGNIFICANT CHANGE UP (ref 13–17)
MAGNESIUM SERPL-MCNC: 2.3 MG/DL — SIGNIFICANT CHANGE UP (ref 1.6–2.6)
MCHC RBC-ENTMCNC: 26.7 PG — LOW (ref 27–34)
MCHC RBC-ENTMCNC: 30.5 GM/DL — LOW (ref 32–36)
MCV RBC AUTO: 87.8 FL — SIGNIFICANT CHANGE UP (ref 80–100)
MRSA PCR RESULT.: SIGNIFICANT CHANGE UP
NRBC # BLD: 0 /100 WBCS — SIGNIFICANT CHANGE UP
NRBC # FLD: 0 K/UL — SIGNIFICANT CHANGE UP
PHOSPHATE SERPL-MCNC: 4.7 MG/DL — HIGH (ref 2.5–4.5)
PLATELET # BLD AUTO: 188 K/UL — SIGNIFICANT CHANGE UP (ref 150–400)
POTASSIUM SERPL-MCNC: 5 MMOL/L — SIGNIFICANT CHANGE UP (ref 3.5–5.3)
POTASSIUM SERPL-SCNC: 5 MMOL/L — SIGNIFICANT CHANGE UP (ref 3.5–5.3)
RBC # BLD: 5.01 M/UL — SIGNIFICANT CHANGE UP (ref 4.2–5.8)
RBC # FLD: 14.8 % — HIGH (ref 10.3–14.5)
S AUREUS DNA NOSE QL NAA+PROBE: SIGNIFICANT CHANGE UP
SODIUM SERPL-SCNC: 138 MMOL/L — SIGNIFICANT CHANGE UP (ref 135–145)
WBC # BLD: 5.96 K/UL — SIGNIFICANT CHANGE UP (ref 3.8–10.5)
WBC # FLD AUTO: 5.96 K/UL — SIGNIFICANT CHANGE UP (ref 3.8–10.5)

## 2021-02-06 PROCEDURE — 71045 X-RAY EXAM CHEST 1 VIEW: CPT | Mod: 26

## 2021-02-06 PROCEDURE — 99239 HOSP IP/OBS DSCHRG MGMT >30: CPT | Mod: GC

## 2021-02-06 RX ORDER — SITAGLIPTIN 50 MG/1
1 TABLET, FILM COATED ORAL
Qty: 30 | Refills: 0
Start: 2021-02-06 | End: 2021-03-07

## 2021-02-06 RX ORDER — SITAGLIPTIN 50 MG/1
1 TABLET, FILM COATED ORAL
Qty: 0 | Refills: 0 | DISCHARGE

## 2021-02-06 RX ADMIN — CHLORHEXIDINE GLUCONATE 1 APPLICATION(S): 213 SOLUTION TOPICAL at 12:47

## 2021-02-06 RX ADMIN — Medication 667 MILLIGRAM(S): at 12:46

## 2021-02-06 RX ADMIN — AMLODIPINE BESYLATE 10 MILLIGRAM(S): 2.5 TABLET ORAL at 05:34

## 2021-02-06 RX ADMIN — HEPARIN SODIUM 5000 UNIT(S): 5000 INJECTION INTRAVENOUS; SUBCUTANEOUS at 01:43

## 2021-02-06 NOTE — DIETITIAN INITIAL EVALUATION ADULT. - DIET TYPE
Add Nepro - 2x daily/renal replacement pts:no protein restr,no conc K & phos, low sodium/supplement (specify)

## 2021-02-06 NOTE — PROGRESS NOTE ADULT - SUBJECTIVE AND OBJECTIVE BOX
[facility] Medicine  Pager [phone]    Patient is a 86y old  Male who presents with a chief complaint of Elevated potassium (05 Feb 2021 11:43)      INTERVAL HPI/OVERNIGHT EVENTS:    MEDICATIONS  (STANDING):  amLODIPine   Tablet 10 milliGRAM(s) Oral daily  calcium acetate 667 milliGRAM(s) Oral three times a day with meals  chlorhexidine 2% Cloths 1 Application(s) Topical daily  dextrose 40% Gel 15 Gram(s) Oral once  dextrose 5%. 1000 milliLiter(s) (50 mL/Hr) IV Continuous <Continuous>  dextrose 5%. 1000 milliLiter(s) (100 mL/Hr) IV Continuous <Continuous>  dextrose 50% Injectable 25 Gram(s) IV Push once  dextrose 50% Injectable 12.5 Gram(s) IV Push once  dextrose 50% Injectable 25 Gram(s) IV Push once  folic acid 1 milliGRAM(s) Oral daily  glucagon  Injectable 1 milliGRAM(s) IntraMuscular once  heparin   Injectable 5000 Unit(s) SubCutaneous every 8 hours  influenza  Vaccine (HIGH DOSE) 0.7 milliLiter(s) IntraMuscular once  simvastatin 20 milliGRAM(s) Oral at bedtime    MEDICATIONS  (PRN):      Allergies    No Known Allergies    Intolerances        REVIEW OF SYSTEMS:  Please see interval HPI:    Vital Signs Last 24 Hrs  T(C): 36.7 (06 Feb 2021 05:32), Max: 36.7 (06 Feb 2021 05:32)  T(F): 98.1 (06 Feb 2021 05:32), Max: 98.1 (06 Feb 2021 05:32)  HR: 76 (06 Feb 2021 05:32) (72 - 87)  BP: 149/65 (06 Feb 2021 05:32) (114/68 - 185/88)  BP(mean): --  RR: 18 (06 Feb 2021 05:32) (17 - 18)  SpO2: 99% (06 Feb 2021 05:32) (98% - 100%)  I&O's Detail    05 Feb 2021 07:01  -  06 Feb 2021 07:00  --------------------------------------------------------  IN:    Oral Fluid: 380 mL    Other (mL): 400 mL  Total IN: 780 mL    OUT:    Other (mL): 2400 mL    Voided (mL): 300 mL  Total OUT: 2700 mL    Total NET: -1920 mL            PHYSICAL EXAM:  GENERAL:   HEAD:    EYES:   ENMT:   NECK:   NERVOUS SYSTEM:    CHEST/LUNG:   HEART:   ABDOMEN:   EXTREMITIES:    LYMPH:   SKIN:     LABS:                        13.4   5.96  )-----------( 188      ( 06 Feb 2021 06:23 )             44.0     06 Feb 2021 06:23    138    |  96     |  30     ----------------------------<  96     5.0     |  29     |  9.70     Ca    8.7        06 Feb 2021 06:23  Phos  4.7       06 Feb 2021 06:23  Mg     2.3       06 Feb 2021 06:23        CAPILLARY BLOOD GLUCOSE      POCT Blood Glucose.: 85 mg/dL (06 Feb 2021 07:55)  POCT Blood Glucose.: 89 mg/dL (05 Feb 2021 19:55)  POCT Blood Glucose.: 90 mg/dL (05 Feb 2021 17:25)  POCT Blood Glucose.: 228 mg/dL (05 Feb 2021 15:35)  POCT Blood Glucose.: 151 mg/dL (05 Feb 2021 13:31)  POCT Blood Glucose.: 162 mg/dL (05 Feb 2021 12:11)    BLOOD CULTURE    RADIOLOGY & ADDITIONAL TESTS:    Imaging Personally Reviewed:  [ ] YES     Consultant(s) Notes Reviewed:      Care Discussed with Consultants/Other Providers: [facility] Medicine  Pager [phone]    Patient is a 86y old  Male who presents with a chief complaint of Elevated potassium (05 Feb 2021 11:43)      INTERVAL HPI/OVERNIGHT EVENTS:  Patient underwent HD yesterday, denies any current SOB/PARKER/palpitations/LE edema. Wants to go home today. Discussed renal diet, patient reports knowing which foods to avoid (including foods high in potassium). Noted to have episodes of hypoglycemia yesterday, patient notes is aware when his sugar is low. However also reports feeling sweaty/weak in the morning at home which improves after he eats something. Discussed importance of glucose monitoring, patient states he has the device at home and now knows the importance of checking his sugars. Discussed potential need to adjust his diabetes medications to avoid further episodes of hypoglycemia, patient in agreement. States he will followup with renal as otpt as well.     MEDICATIONS  (STANDING):  amLODIPine   Tablet 10 milliGRAM(s) Oral daily  calcium acetate 667 milliGRAM(s) Oral three times a day with meals  chlorhexidine 2% Cloths 1 Application(s) Topical daily  dextrose 40% Gel 15 Gram(s) Oral once  dextrose 5%. 1000 milliLiter(s) (50 mL/Hr) IV Continuous <Continuous>  dextrose 5%. 1000 milliLiter(s) (100 mL/Hr) IV Continuous <Continuous>  dextrose 50% Injectable 25 Gram(s) IV Push once  dextrose 50% Injectable 12.5 Gram(s) IV Push once  dextrose 50% Injectable 25 Gram(s) IV Push once  folic acid 1 milliGRAM(s) Oral daily  glucagon  Injectable 1 milliGRAM(s) IntraMuscular once  heparin   Injectable 5000 Unit(s) SubCutaneous every 8 hours  influenza  Vaccine (HIGH DOSE) 0.7 milliLiter(s) IntraMuscular once  simvastatin 20 milliGRAM(s) Oral at bedtime    MEDICATIONS  (PRN):    Allergies  No Known Allergies    Intolerances    REVIEW OF SYSTEMS:  Please see interval HPI:    Vital Signs Last 24 Hrs  T(C): 36.7 (06 Feb 2021 05:32), Max: 36.7 (06 Feb 2021 05:32)  T(F): 98.1 (06 Feb 2021 05:32), Max: 98.1 (06 Feb 2021 05:32)  HR: 76 (06 Feb 2021 05:32) (72 - 87)  BP: 149/65 (06 Feb 2021 05:32) (114/68 - 185/88)  BP(mean): --  RR: 18 (06 Feb 2021 05:32) (17 - 18)  SpO2: 99% (06 Feb 2021 05:32) (98% - 100%)  I&O's Detail    05 Feb 2021 07:01  -  06 Feb 2021 07:00  --------------------------------------------------------  IN:    Oral Fluid: 380 mL    Other (mL): 400 mL  Total IN: 780 mL    OUT:    Other (mL): 2400 mL    Voided (mL): 300 mL  Total OUT: 2700 mL    Total NET: -1920 mL    PHYSICAL EXAM:  GENERAL: NAD, lying in bed  HEAD:  NC/AT  EYES: EOMI, +scleral injection  ENMT: MMM  NECK: supple  NERVOUS SYSTEM: moving extremities, non-focal   CHEST/LUNG: CTAB, comfortable on RA, speaking in full sentences  HEART: S1S2 RRR  ABDOMEN: soft, non-tender  EXTREMITIES:  no c/c/e  SKIN: +LUE AVF    LABS:                        13.4   5.96  )-----------( 188      ( 06 Feb 2021 06:23 )             44.0     06 Feb 2021 06:23    138    |  96     |  30     ----------------------------<  96     5.0     |  29     |  9.70     Ca    8.7        06 Feb 2021 06:23  Phos  4.7       06 Feb 2021 06:23  Mg     2.3       06 Feb 2021 06:23    CAPILLARY BLOOD GLUCOSE  POCT Blood Glucose.: 85 mg/dL (06 Feb 2021 07:55)  POCT Blood Glucose.: 89 mg/dL (05 Feb 2021 19:55)  POCT Blood Glucose.: 90 mg/dL (05 Feb 2021 17:25)  POCT Blood Glucose.: 228 mg/dL (05 Feb 2021 15:35)  POCT Blood Glucose.: 151 mg/dL (05 Feb 2021 13:31)  POCT Blood Glucose.: 162 mg/dL (05 Feb 2021 12:11)    BLOOD CULTURE    RADIOLOGY & ADDITIONAL TESTS:    Imaging Personally Reviewed:  [ ] YES     Consultant(s) Notes Reviewed:  Renal    Care Discussed with Consultants/Other Providers: JEANE Mccall re: patient s/p HD, hyperkalemia improved, will need to ensure otpt HD reinstated, given concerns of hypoglycemia, needs diabetes education, glucose monitoring education, plan to adjust home diabetes regimen (ie if doing januvia should be renally dosed)

## 2021-02-06 NOTE — DISCHARGE NOTE PROVIDER - NSDCMRMEDTOKEN_GEN_ALL_CORE_FT
amLODIPine 10 mg oral tablet: 1 tab(s) orally once a day  calcium acetate 667 mg oral tablet: 1 tab(s) orally 3 times a day  folic acid 1 mg oral tablet: 1 tab(s) orally once a day  Januvia 25 mg oral tablet: 1 tab(s) orally once a day   simvastatin 20 mg oral tablet: 1 tab(s) orally once a day (at bedtime)

## 2021-02-06 NOTE — DIETITIAN INITIAL EVALUATION ADULT. - OTHER INFO
Pt 87 yo male with PMHx of HTN, DM, HLD, ESRD on HD - per chart review.     At time of visit Pt appears alert, oriented. Per Pt his appetite good; no chewing or swallowing difficulty; no nausea, vomiting or diarrhea @ this time. But Pt C/O constipation. Per Pt his height: ~68" & his weight: ~140#, now. Pt with unintended weight loss when HD was initiated July, 2020. Food preferences discussed; will recommend to add PO supplement: Nepro - 2x daily to diet rx.     Pt does not for therapeutic diet at home "as strictly" reported. Better food options discussed with Pt; RDN offered written materials on Renal diet, but Pt receives diet information from dialysis center dietitians reported. No other food related concern voiced @ present. RDN remains available.

## 2021-02-06 NOTE — DISCHARGE NOTE NURSING/CASE MANAGEMENT/SOCIAL WORK - PATIENT PORTAL LINK FT
You can access the FollowMyHealth Patient Portal offered by [facility] by registering at the following website: [email or web address]. By joining [facility]’s FollowMyHealth portal, you will also be able to view your health information using other applications (apps) compatible with our system.

## 2021-02-06 NOTE — DISCHARGE NOTE PROVIDER - NSDCCPCAREPLAN_GEN_ALL_CORE_FT
PRINCIPAL DISCHARGE DIAGNOSIS  Diagnosis: Hyperkalemia  Assessment and Plan of Treatment: Secondary to end stage renal disease. Potassium improved after dialysis. Please follow up with your nephrologist for dialysis care and management.      SECONDARY DISCHARGE DIAGNOSES  Diagnosis: HTN (hypertension)  Assessment and Plan of Treatment: Continue blood pressure medication regimen as directed. Monitor for any visual changes, headaches or dizziness.  Monitor blood pressure regularly.  Follow up with your primary care provider/cardiologist for further management for high blood pressure.    Diagnosis: ESRD (end stage renal disease)  Assessment and Plan of Treatment: Please continue to follow your dialysis schedule and refer to your primary provider/nephrologist for further care and monitoring of kidney function and electrolytes. Continue a renal restricted diet (avoiding foods high in potassium and phosphorus). Continue your prescribed medications and supplementations as directed.    Diagnosis: Diabetes  Assessment and Plan of Treatment: Your Januvia dose was decreased due to your abnormal kidney function and low blood sugar.   Follow a consistent carbohydrate diet (meaning eat the same amount of carbohydrates at the same time each day). Monitor blood glucose levels throughout the day before meals and at bedtime. Record your blood sugars and bring to your outpatient provider's appointment in order to be reviewed by your doctor. If your sugars are more than 400 or less than 70, you should contact your primary care physician/endocrinologist immediately. Monitor for signs/symptoms of low blood glucose, such as dizziness, altered mental status or cool/clammy skin. In addition, monitor for signs/symptoms of high blood glucose such as feeling hot, dry, fatigued or with increased thirst/urination. Make regular podiatry appointments in order to have your feet checked for wounds and uncontrolled toe nail growth to prevent infections. Make an appointment with an ophthalmologist to monitor your vision.

## 2021-02-06 NOTE — DISCHARGE NOTE PROVIDER - NSDCFUSCHEDAPPT_GEN_ALL_CORE_FT
VINCENT VALE ; 02/09/2021 ; [facility] [address] ; 02/09/2021 ; [facility] [address] ; 03/11/2021 ; NPP [address]

## 2021-02-06 NOTE — DISCHARGE NOTE PROVIDER - CARE PROVIDER_API CALL
Jorje Abreu)  Nephrology  [address] [place] [address], [address] [place] [address]  Phone: [phone]  Fax: [phone]  Follow Up Time:

## 2021-02-06 NOTE — DISCHARGE NOTE PROVIDER - HOSPITAL COURSE
87 yo M w/ HTN, HLD, DM2, ESRD on HD MWF, presenting with hyperkalemia, admitted for HD session, course c/b hypoglycemia.     Hyperkalemia 2/2 ESRD  - s/p temporizing measures and HD session  - hyperkalemia improved today, K 5  - c/w phoslo for hyperphosphatemia    DM2 c/b hypoglycemia, not on long term insulin therapy  - hypoglycemia here in the hospital improved  - continue to monitor FS, monitor PO intake  - patient on januvia 100 mg at home, also reporting symptoms of hypoglycemia (sweats/weakness) at home  - januvia dose decreased to 25 mg (renal adjusted for patient on intermittent HD)     HTN  - c/w amlodipine    HLD  - c/w statin    PT eval - home, no needs     This case was reviewed with Dr. Moeller. The patient is medically stable and optimized for discharge. All medications were reviewed and prescriptions were sent to [facility]. 85 yo M w/ HTN, HLD, DM2, ESRD on HD MWF, presenting with hyperkalemia, admitted for HD session, course c/b hypoglycemia.     Hyperkalemia 2/2 ESRD  - s/p temporizing measures and HD session  - hyperkalemia improved today, K 5  - c/w phoslo for hyperphosphatemia    DM2 c/b hypoglycemia, not on long term insulin therapy  - hypoglycemia here in the hospital improved  - continue to monitor FS, monitor PO intake  - patient on januvia 100 mg at home, also reporting symptoms of hypoglycemia (sweats/weakness) at home  - januvia dose decreased to 25 mg (renal adjusted for patient on intermittent HD)     HTN  - c/w amlodipine    HLD  - c/w statin    PT eval - home, no needs     This case was reviewed with Dr. Moeller. The patient is medically stable and optimized for discharge. All medications were reviewed and prescriptions were sent to [facility]. CM/SW assisted with ensuring otpt HD in place.

## 2021-02-06 NOTE — PROGRESS NOTE ADULT - ATTENDING COMMENTS
Addendum: anticipatory guidance provided, dose of januvia adjusted, patient aware to do glucose monitoring, otpt f/u w/ renal, HD on regular schedule.     Discharge time 33 minutes

## 2021-02-06 NOTE — PHYSICAL THERAPY INITIAL EVALUATION ADULT - PERTINENT HX OF CURRENT PROBLEM, REHAB EVAL
85 yo M with PMH HTN, DM, HLD, ESRD on HD last HD Wed, sent by PMD for "elevated potassium". He has been having diaphoresis occasionally and weight loss, but weight loss has been since starting HD last July 2020.

## 2021-02-10 LAB
POTASSIUM SERPL-MCNC: 5.7 MMOL/L — HIGH (ref 3.5–5.3)
POTASSIUM SERPL-SCNC: 5.7 MMOL/L — HIGH (ref 3.5–5.3)

## 2021-02-12 LAB
POTASSIUM SERPL-MCNC: 5.4 MMOL/L — HIGH (ref 3.5–5.3)
POTASSIUM SERPL-SCNC: 5.4 MMOL/L — HIGH (ref 3.5–5.3)

## 2021-02-19 LAB
POTASSIUM SERPL-MCNC: 4.9 MMOL/L — SIGNIFICANT CHANGE UP (ref 3.5–5.3)
POTASSIUM SERPL-SCNC: 4.9 MMOL/L — SIGNIFICANT CHANGE UP (ref 3.5–5.3)

## 2021-02-23 ENCOUNTER — APPOINTMENT (OUTPATIENT)
Dept: VASCULAR SURGERY | Facility: CLINIC | Age: 86
End: 2021-02-23
Payer: MEDICARE

## 2021-02-23 VITALS
SYSTOLIC BLOOD PRESSURE: 142 MMHG | BODY MASS INDEX: 25.46 KG/M2 | WEIGHT: 168 LBS | DIASTOLIC BLOOD PRESSURE: 79 MMHG | HEART RATE: 88 BPM | HEIGHT: 68 IN

## 2021-02-23 VITALS — TEMPERATURE: 97.2 F

## 2021-02-23 PROCEDURE — 93990 DOPPLER FLOW TESTING: CPT

## 2021-02-23 PROCEDURE — 99072 ADDL SUPL MATRL&STAF TM PHE: CPT

## 2021-02-23 PROCEDURE — 99213 OFFICE O/P EST LOW 20 MIN: CPT

## 2021-02-23 NOTE — HISTORY OF PRESENT ILLNESS
[] : left radiocephalic fistula [FreeTextEntry1] : 87 yo male with history of esrd on hd presents for follow up of left upper extremity radial cephalic avf.  pt without any complaints \par pt denies any difficulty with avf during hd 0

## 2021-02-23 NOTE — PHYSICAL EXAM
[Thrill] : thrill [Hand well perfused] : hand well perfused [2+] : left 2+ [Normal] : coordination grossly intact, no focal deficits [Pulsatile Thrill] : no pulsatile thrill [Aneurysm] : no aneurysm [Bleeding] : no bleeding [Ulcer] : no ulcer [de-identified] :  strength 5/5 b/l upper extremities [de-identified] : intact

## 2021-02-23 NOTE — DISCUSSION/SUMMARY
[FreeTextEntry1] : 87 yo male with history of esrd on hd presents for follow up of left upper extremity radial cephalic avf\par \par duplex shows 50-70% stenosis of the distal and mid forearm with flow rate of 1111\par \par will continue to monitor given no issues with hd \par pt to follow up in 3-4 months with repeat duplex\par

## 2021-02-24 LAB
POTASSIUM SERPL-MCNC: 5.1 MMOL/L — SIGNIFICANT CHANGE UP (ref 3.5–5.3)
POTASSIUM SERPL-SCNC: 5.1 MMOL/L — SIGNIFICANT CHANGE UP (ref 3.5–5.3)

## 2021-03-05 ENCOUNTER — APPOINTMENT (OUTPATIENT)
Dept: DISASTER EMERGENCY | Facility: CLINIC | Age: 86
End: 2021-03-05

## 2021-03-05 LAB
POTASSIUM SERPL-MCNC: 5.1 MMOL/L — SIGNIFICANT CHANGE UP (ref 3.5–5.3)
POTASSIUM SERPL-SCNC: 5.1 MMOL/L — SIGNIFICANT CHANGE UP (ref 3.5–5.3)
SARS-COV-2 N GENE NPH QL NAA+PROBE: NOT DETECTED

## 2021-03-09 ENCOUNTER — APPOINTMENT (OUTPATIENT)
Dept: ENDOVASCULAR SURGERY | Facility: CLINIC | Age: 86
End: 2021-03-09
Payer: MEDICARE

## 2021-03-09 ENCOUNTER — RESULT REVIEW (OUTPATIENT)
Age: 86
End: 2021-03-09

## 2021-03-09 VITALS
WEIGHT: 168 LBS | OXYGEN SATURATION: 100 % | RESPIRATION RATE: 15 BRPM | HEIGHT: 68 IN | HEART RATE: 87 BPM | SYSTOLIC BLOOD PRESSURE: 190 MMHG | DIASTOLIC BLOOD PRESSURE: 82 MMHG | BODY MASS INDEX: 25.46 KG/M2 | TEMPERATURE: 97.6 F

## 2021-03-09 PROCEDURE — 36902Z: CUSTOM

## 2021-03-09 PROCEDURE — 36215Z: CUSTOM | Mod: 59

## 2021-03-09 PROCEDURE — 99072 ADDL SUPL MATRL&STAF TM PHE: CPT

## 2021-03-09 RX ORDER — FOLIC ACID 1 MG/1
1 TABLET ORAL DAILY
Refills: 0 | Status: DISCONTINUED | COMMUNITY
Start: 2019-04-18 | End: 2021-03-09

## 2021-03-11 ENCOUNTER — APPOINTMENT (OUTPATIENT)
Dept: GASTROENTEROLOGY | Facility: CLINIC | Age: 86
End: 2021-03-11
Payer: MEDICARE

## 2021-03-11 VITALS
SYSTOLIC BLOOD PRESSURE: 130 MMHG | TEMPERATURE: 98 F | BODY MASS INDEX: 21.98 KG/M2 | WEIGHT: 145 LBS | OXYGEN SATURATION: 99 % | HEART RATE: 99 BPM | DIASTOLIC BLOOD PRESSURE: 60 MMHG | HEIGHT: 68 IN | RESPIRATION RATE: 15 BRPM

## 2021-03-11 DIAGNOSIS — K62.5 HEMORRHAGE OF ANUS AND RECTUM: ICD-10-CM

## 2021-03-11 DIAGNOSIS — K59.09 OTHER CONSTIPATION: ICD-10-CM

## 2021-03-11 DIAGNOSIS — D64.9 ANEMIA, UNSPECIFIED: ICD-10-CM

## 2021-03-11 PROCEDURE — 99072 ADDL SUPL MATRL&STAF TM PHE: CPT

## 2021-03-11 PROCEDURE — 99204 OFFICE O/P NEW MOD 45 MIN: CPT

## 2021-03-11 RX ORDER — POLYETHYLENE GLYCOL 3350 AND ELECTROLYTES WITH LEMON FLAVOR 236; 22.74; 6.74; 5.86; 2.97 G/4L; G/4L; G/4L; G/4L; G/4L
236 POWDER, FOR SOLUTION ORAL
Qty: 1 | Refills: 0 | Status: ACTIVE | COMMUNITY
Start: 2021-03-11 | End: 1900-01-01

## 2021-03-12 NOTE — REASON FOR VISIT
[Other ___] : a [unfilled] visit for [Inadequate Flow within AVF] : inadequate flow within AVF [Family Member] : family member [FreeTextEntry2] : possible recirculation

## 2021-03-12 NOTE — HISTORY OF PRESENT ILLNESS
[] : left radiocephalic fistula [FreeTextEntry1] : 5/7/2020 Dr Adams [FreeTextEntry5] : yesterday at 8 pm [FreeTextEntry6] : Dr Munoz

## 2021-03-14 PROBLEM — K62.5 BLOOD PER RECTUM: Status: ACTIVE | Noted: 2021-03-14

## 2021-03-14 PROBLEM — D64.9 ANEMIA: Status: ACTIVE | Noted: 2021-03-14

## 2021-03-14 PROBLEM — K59.09 CHRONIC CONSTIPATION: Status: ACTIVE | Noted: 2021-03-11

## 2021-03-14 RX ORDER — DOXAZOSIN 4 MG/1
4 TABLET ORAL DAILY
Qty: 90 | Refills: 1 | Status: DISCONTINUED | COMMUNITY
Start: 2020-01-23 | End: 2021-03-14

## 2021-03-14 RX ORDER — TORSEMIDE 100 MG/1
100 TABLET ORAL DAILY
Qty: 90 | Refills: 2 | Status: DISCONTINUED | COMMUNITY
Start: 2019-04-18 | End: 2021-03-14

## 2021-03-14 NOTE — CONSULT LETTER
[Dear  ___] : Dear  [unfilled], [Consult Letter:] : I had the pleasure of evaluating your patient, [unfilled]. [Please see my note below.] : Please see my note below. [Consult Closing:] : Thank you very much for allowing me to participate in the care of this patient.  If you have any questions, please do not hesitate to contact me. [Sincerely,] : Sincerely, [DrMukesh  ___] : Dr. GARZA [FreeTextEntry3] : Alok Lopez MD, MPH, FASGE\par Chief of Clinical Quality in Gastroenterology, [facility]\par Associate Chief of Gastroenterology, [facility]\par Director of Endoscopic Ultrasound, [facility]\par [address], [address]\par [place], [address]\par 24 hours [phone]\par \par

## 2021-03-14 NOTE — ASSESSMENT
[FreeTextEntry1] : Impression:\par \par #1.  Two episodes of GI bleed/blood per rectum, painless, likely hemorrhoidal, rule out neoplasm\par \par #2.   Chronic constipation\par \par #3.  Anemia, normocytic, Hb last 8.5 in mid 2020.\par \par #4.  ESRD on HD Mon/Wed/Fri\par \par #5.  Diabetes\par \par Plan\par \par #1.  Pt was offered colorectal surgery consultation and/or colonoscopy with CRS or GI.  He chooses to have colonoscopy with me.  Will schedule on a Tuesday (nondialysis day).\par \par Risks, benefits, alternatives of the procedure were discussed with the patient and the patient was educated about the procedure. Risks include, but are not limited to, bleeding, infection, injury to internal organs, possible need for further procedures including emergency surgery, missed lesions, risk of anesthesia, and risk of IV site problems.  Patient understands and agrees to proceed.\par \par #2. Golytely ordered.\par \par #3.  Presurgical testing appointment with labs.

## 2021-03-14 NOTE — HISTORY OF PRESENT ILLNESS
[FreeTextEntry1] : PMD:  Mikal Feliciano\par \par Constipation x years\par Rare painless rectal bleed (Nov 2020, Feb 2021)\par \par Feels like rectal prolapse\par \par No fevers, chills, sweats, abdominal pain, heartburn, dysphagia, nausea, vomiting, diarrhea, melena, jaundice or weight loss.\par \par Labs:  June 2020 Hb 8.5, MCV 88\par \par Last colonoscopy more than 5 years ago per patient recall.\par \par \par

## 2021-03-14 NOTE — PHYSICAL EXAM
[General Appearance - Alert] : alert [General Appearance - In No Acute Distress] : in no acute distress [Sclera] : the sclera and conjunctiva were normal [Auscultation Breath Sounds / Voice Sounds] : lungs were clear to auscultation bilaterally [Heart Rate And Rhythm] : heart rate was normal and rhythm regular [Bowel Sounds] : normal bowel sounds [Abdomen Soft] : soft [Abdomen Tenderness] : non-tender [] : no hepato-splenomegaly [Abdomen Mass (___ Cm)] : no abdominal mass palpated [Abnormal Walk] : normal gait [Affect] : the affect was normal [FreeTextEntry1] : No confusion

## 2021-03-17 LAB
POTASSIUM SERPL-MCNC: 4.7 MMOL/L — SIGNIFICANT CHANGE UP (ref 3.5–5.3)
POTASSIUM SERPL-SCNC: 4.7 MMOL/L — SIGNIFICANT CHANGE UP (ref 3.5–5.3)

## 2021-03-24 LAB
POTASSIUM SERPL-MCNC: 3.8 MMOL/L — SIGNIFICANT CHANGE UP (ref 3.5–5.3)
POTASSIUM SERPL-SCNC: 3.8 MMOL/L — SIGNIFICANT CHANGE UP (ref 3.5–5.3)

## 2021-03-29 ENCOUNTER — RX RENEWAL (OUTPATIENT)
Age: 86
End: 2021-03-29

## 2021-04-02 DIAGNOSIS — Z01.818 ENCOUNTER FOR OTHER PREPROCEDURAL EXAMINATION: ICD-10-CM

## 2021-04-03 ENCOUNTER — APPOINTMENT (OUTPATIENT)
Dept: DISASTER EMERGENCY | Facility: CLINIC | Age: 86
End: 2021-04-03

## 2021-04-06 ENCOUNTER — APPOINTMENT (OUTPATIENT)
Dept: GASTROENTEROLOGY | Facility: HOSPITAL | Age: 86
End: 2021-04-06

## 2021-04-07 LAB
POTASSIUM SERPL-MCNC: 5.2 MMOL/L — SIGNIFICANT CHANGE UP (ref 3.5–5.3)
POTASSIUM SERPL-SCNC: 5.2 MMOL/L — SIGNIFICANT CHANGE UP (ref 3.5–5.3)

## 2021-04-22 DIAGNOSIS — I10 ESSENTIAL (PRIMARY) HYPERTENSION: ICD-10-CM

## 2021-04-28 LAB
POTASSIUM SERPL-MCNC: 5 MMOL/L — SIGNIFICANT CHANGE UP (ref 3.5–5.3)
POTASSIUM SERPL-SCNC: 5 MMOL/L — SIGNIFICANT CHANGE UP (ref 3.5–5.3)

## 2021-05-19 LAB
POTASSIUM SERPL-MCNC: 4.8 MMOL/L — SIGNIFICANT CHANGE UP (ref 3.5–5.3)
POTASSIUM SERPL-SCNC: 4.8 MMOL/L — SIGNIFICANT CHANGE UP (ref 3.5–5.3)

## 2021-06-16 LAB
POTASSIUM SERPL-MCNC: 5.6 MMOL/L — HIGH (ref 3.5–5.3)
POTASSIUM SERPL-SCNC: 5.6 MMOL/L — HIGH (ref 3.5–5.3)

## 2021-06-30 LAB
POTASSIUM SERPL-MCNC: 4 MMOL/L — SIGNIFICANT CHANGE UP (ref 3.5–5.3)
POTASSIUM SERPL-SCNC: 4 MMOL/L — SIGNIFICANT CHANGE UP (ref 3.5–5.3)

## 2021-07-01 ENCOUNTER — APPOINTMENT (OUTPATIENT)
Dept: VASCULAR SURGERY | Facility: CLINIC | Age: 86
End: 2021-07-01
Payer: MEDICARE

## 2021-07-01 PROCEDURE — 99072 ADDL SUPL MATRL&STAF TM PHE: CPT

## 2021-07-01 PROCEDURE — 99213 OFFICE O/P EST LOW 20 MIN: CPT

## 2021-07-01 PROCEDURE — 93990 DOPPLER FLOW TESTING: CPT

## 2021-07-01 NOTE — DISCUSSION/SUMMARY
[FreeTextEntry1] : 85 yo male with history of esrd on hd presents for follow up of left upper extremity radial cephalic avf\par \par duplex showsno sig stenosis\par will continue to monitor given no issues with hd \par pt to follow up in 4 months with repeat duplex\par

## 2021-07-01 NOTE — HISTORY OF PRESENT ILLNESS
[FreeTextEntry1] : 87 yo male with history of esrd on hd presents for follow up of left upper extremity radial cephalic avf.  pt without any complaints \par pt denies any difficulty with avf during hd [] : left radiocephalic fistula

## 2021-07-14 LAB
POTASSIUM SERPL-MCNC: 4.4 MMOL/L — SIGNIFICANT CHANGE UP (ref 3.5–5.3)
POTASSIUM SERPL-SCNC: 4.4 MMOL/L — SIGNIFICANT CHANGE UP (ref 3.5–5.3)

## 2021-07-21 LAB
POTASSIUM SERPL-MCNC: 4.1 MMOL/L — SIGNIFICANT CHANGE UP (ref 3.5–5.3)
POTASSIUM SERPL-SCNC: 4.1 MMOL/L — SIGNIFICANT CHANGE UP (ref 3.5–5.3)

## 2021-07-28 LAB
POTASSIUM SERPL-MCNC: 5.9 MMOL/L — HIGH (ref 3.5–5.3)
POTASSIUM SERPL-SCNC: 5.9 MMOL/L — HIGH (ref 3.5–5.3)

## 2021-08-04 LAB
POTASSIUM SERPL-MCNC: 6.5 MMOL/L — CRITICAL HIGH (ref 3.5–5.3)
POTASSIUM SERPL-SCNC: 6.5 MMOL/L — CRITICAL HIGH (ref 3.5–5.3)

## 2021-08-20 NOTE — ED ADULT NURSE NOTE - TEMPERATURE IN CELSIUS (DEGREES C)
Ramona Naranjo is calling to request a refill on the following medication(s):    Medication Request:  Requested Prescriptions     Pending Prescriptions Disp Refills    lisinopril (PRINIVIL;ZESTRIL) 5 MG tablet 90 tablet 1     Sig: TAKE 1 TABLET DAILY       Last Visit Date (If Applicable):  7/01/6707    Next Visit Date:    8/23/2021
36.4

## 2021-10-06 PROBLEM — I10 ESSENTIAL HYPERTENSION: Status: ACTIVE | Noted: 2018-07-12

## 2021-10-19 ENCOUNTER — APPOINTMENT (OUTPATIENT)
Dept: CT IMAGING | Facility: CLINIC | Age: 86
End: 2021-10-19

## 2021-10-28 ENCOUNTER — APPOINTMENT (OUTPATIENT)
Dept: VASCULAR SURGERY | Facility: CLINIC | Age: 86
End: 2021-10-28
Payer: MEDICARE

## 2021-10-28 VITALS
HEIGHT: 68 IN | BODY MASS INDEX: 21.98 KG/M2 | HEART RATE: 69 BPM | DIASTOLIC BLOOD PRESSURE: 71 MMHG | WEIGHT: 145 LBS | SYSTOLIC BLOOD PRESSURE: 163 MMHG

## 2021-10-28 PROCEDURE — 99213 OFFICE O/P EST LOW 20 MIN: CPT

## 2021-10-28 PROCEDURE — 93990 DOPPLER FLOW TESTING: CPT

## 2021-10-28 NOTE — DISCUSSION/SUMMARY
[FreeTextEntry1] : 85 yo male with history of esrd on hd presents for follow up of left upper extremity radial cephalic avf\par \par duplex shows 50% stenosis of the proximal forearm with flow rate of 1538\par \par will continue to monitor given no issues with hd \par pt to follow up in 3-4 months with repeat duplex\par

## 2021-10-28 NOTE — HISTORY OF PRESENT ILLNESS
[] : left radiocephalic fistula [FreeTextEntry1] : 85 yo male with history of esrd on hd presents for follow up of left upper extremity radial cephalic avf.  pt without any complaints \par pt denies any difficulty with avf during hd

## 2021-10-28 NOTE — PHYSICAL EXAM
[Thrill] : thrill [Hand well perfused] : hand well perfused [2+] : left 2+ [Normal] : coordination grossly intact, no focal deficits [Pulsatile Thrill] : no pulsatile thrill [Aneurysm] : no aneurysm [Ulcer] : no ulcer [de-identified] : intact, small area over distal volar aspect of the wrist with healed wound with hyperkeratotic skin  (pt reports spilling hot tea)

## 2021-12-06 LAB
POTASSIUM SERPL-MCNC: 6.1 MMOL/L — HIGH (ref 3.5–5.3)
POTASSIUM SERPL-SCNC: 6.1 MMOL/L — HIGH (ref 3.5–5.3)

## 2022-01-13 NOTE — DIETITIAN INITIAL EVALUATION ADULT. - ADD RECOMMEND
"Pt is a 74 yo WM here for diarrhea x3 months. Pt states he has episodes every 4-5 days that last about 1-2 days. Denies hematochezia, melena, abd pain/cramping. Has tried to eliminate certain foods from diet - helped only slightly. Was taken off Metformin. On Trulicity -started 1 year ago, increased 2 months ago.   Still has GB. Also has sour belches. Noted Protonix 40 mg daily on med list. No h/o HP.  Last colonoscopy 2014 in [facility]" - diverticulosis, hemorrhoids, no polyps - Strong family history of colon CA: paternal grandfather (dx in his 70s), father (dx in his 60s), and older brother (dx in his 60s).  H/o elevated ALP at PCP a few months ago but normal liver u/s & LFTs all normal at f/u check a couple of months later.     Past Medical History:   Diagnosis Date    Carcinoma of prostate     Hypertension     Type 2 diabetes mellitus     Vitamin B 12 deficiency(non anemic)    S/p right nephrectomy 2/2 of renal cell carcinoma    Review of Systems   Constitutional: Negative for chills and fever.   Respiratory: Negative for shortness of breath.    Cardiovascular: Negative for chest pain.   Gastrointestinal: Positive for diarrhea. Negative for abdominal pain, blood in stool, constipation, melena, nausea and vomiting.   Skin: Negative for rash.   Neurological: Negative for weakness.     Physical Exam  Vitals reviewed. Exam conducted with a chaperone present.   Constitutional:       Appearance: Normal appearance.   HENT:      Head: Normocephalic.   Eyes:      General: No scleral icterus.     Pupils: Pupils are equal, round, and reactive to light.   Cardiovascular:      Rate and Rhythm: Normal rate.      Pulses: Normal pulses.   Pulmonary:      Effort: Pulmonary effort is normal.   Abdominal:      General: Abdomen is flat. There is no distension.      Palpations: Abdomen is soft.      Tenderness: There is no abdominal tenderness. There is no guarding or rebound.   Musculoskeletal:         General: No " 1. Encourage & assist Pt with meals; Monitor PO diet tolerance; Honor food preferences;           2. Rec: Nephro-og daily for micronutrient coverage;          3. Add bowel regimen per MD discretion;       4. Monitor labs, hydration status; swelling.   Skin:     General: Skin is warm and dry.      Coloration: Skin is not jaundiced.   Neurological:      General: No focal deficit present.      Mental Status: He is alert and oriented to person, place, and time.   Psychiatric:         Mood and Affect: Mood normal.         Behavior: Behavior normal.         Thought Content: Thought content normal.         Judgment: Judgment normal.       Plan  -CBC, CMP  -stool studies, TSH, T4, tTG, ESR, CRP, calprotectin  -colonoscopy & EGD for chronic diarrhea (h/o GILBERT - do separate days)  -GB u/s & HIDA scan at f/u if no better  -f/u PCP for DM med management - diarrhea may be 2/2 to SE of meds  -Imodium PRN as directed on box for now  -RTC 6-8 weeks, sooner PRN

## 2022-03-01 ENCOUNTER — APPOINTMENT (OUTPATIENT)
Dept: VASCULAR SURGERY | Facility: CLINIC | Age: 87
End: 2022-03-01
Payer: MEDICARE

## 2022-03-01 PROCEDURE — 93990 DOPPLER FLOW TESTING: CPT

## 2022-03-07 NOTE — H&P ADULT - I WAS PHYSICALLY PRESENT FOR THE KEY PORTIONS OF THE EVALUATION AND MANAGEMENT (E/M) SERVICE PROVIDED.  I AGREE WITH THE ABOVE HISTORY, PHYSICAL, AND PLAN WHICH I HAVE REVIEWED AND EDITED WHERE APPROPRIATE
Otolaryngologist Procedure Text (A): After obtaining clear surgical margins the patient was sent to otolaryngology for surgical repair.  The patient understands they will receive post-surgical care and follow-up from the referring physician's office. Statement Selected

## 2022-03-15 ENCOUNTER — APPOINTMENT (OUTPATIENT)
Dept: VASCULAR SURGERY | Facility: CLINIC | Age: 87
End: 2022-03-15
Payer: MEDICARE

## 2022-03-15 VITALS
WEIGHT: 145 LBS | DIASTOLIC BLOOD PRESSURE: 66 MMHG | HEART RATE: 68 BPM | HEIGHT: 68 IN | BODY MASS INDEX: 21.98 KG/M2 | SYSTOLIC BLOOD PRESSURE: 156 MMHG

## 2022-03-15 PROCEDURE — 99213 OFFICE O/P EST LOW 20 MIN: CPT

## 2022-03-17 NOTE — HISTORY OF PRESENT ILLNESS
[FreeTextEntry1] : 86 yo male with history of esrd on hd presents for follow up of left upper extremity radial cephalic avf.  pt without any complaints \par pt denies any difficulty with avf during hd [] : left radiocephalic fistula

## 2022-03-17 NOTE — DISCUSSION/SUMMARY
[FreeTextEntry1] : 86 yo male with history of esrd on hd presents for follow up of left upper extremity radial cephalic avf\par \par duplex shows 50% stenosis of the proximal forearm with flow rate of 1538\par \par will continue to monitor given no issues with hd \par pt to follow up in 3-4 months with repeat duplex\par

## 2022-03-17 NOTE — PHYSICAL EXAM
[Thrill] : thrill [Pulsatile Thrill] : no pulsatile thrill [Aneurysm] : no aneurysm [Hand well perfused] : hand well perfused [Ulcer] : no ulcer [2+] : left 2+ [Normal] : coordination grossly intact, no focal deficits [de-identified] : intact, small area over distal volar aspect of the wrist with healed wound with hyperkeratotic skin  (pt reports spilling hot tea)

## 2022-05-27 ENCOUNTER — INPATIENT (INPATIENT)
Facility: HOSPITAL | Age: 87
LOS: 1 days | Discharge: HOME | End: 2022-05-29
Attending: STUDENT IN AN ORGANIZED HEALTH CARE EDUCATION/TRAINING PROGRAM | Admitting: STUDENT IN AN ORGANIZED HEALTH CARE EDUCATION/TRAINING PROGRAM
Payer: MEDICARE

## 2022-05-27 VITALS
SYSTOLIC BLOOD PRESSURE: 185 MMHG | HEART RATE: 78 BPM | RESPIRATION RATE: 16 BRPM | OXYGEN SATURATION: 99 % | TEMPERATURE: 98 F | HEIGHT: 68 IN | DIASTOLIC BLOOD PRESSURE: 83 MMHG

## 2022-05-27 DIAGNOSIS — I77.0 ARTERIOVENOUS FISTULA, ACQUIRED: Chronic | ICD-10-CM

## 2022-05-27 DIAGNOSIS — R07.9 CHEST PAIN, UNSPECIFIED: ICD-10-CM

## 2022-05-27 LAB
A1C WITH ESTIMATED AVERAGE GLUCOSE RESULT: 5.5 % — SIGNIFICANT CHANGE UP (ref 4–5.6)
ALBUMIN SERPL ELPH-MCNC: 4.5 G/DL — SIGNIFICANT CHANGE UP (ref 3.3–5)
ALP SERPL-CCNC: 54 U/L — SIGNIFICANT CHANGE UP (ref 40–120)
ALT FLD-CCNC: 10 U/L — SIGNIFICANT CHANGE UP (ref 4–41)
ANION GAP SERPL CALC-SCNC: 10 MMOL/L — SIGNIFICANT CHANGE UP (ref 7–14)
APTT BLD: 33.7 SEC — SIGNIFICANT CHANGE UP (ref 27–36.3)
AST SERPL-CCNC: 29 U/L — SIGNIFICANT CHANGE UP (ref 4–40)
BASE EXCESS BLDV CALC-SCNC: 7 MMOL/L — HIGH (ref -2–3)
BASOPHILS # BLD AUTO: 0.01 K/UL — SIGNIFICANT CHANGE UP (ref 0–0.2)
BASOPHILS NFR BLD AUTO: 0.2 % — SIGNIFICANT CHANGE UP (ref 0–2)
BILIRUB SERPL-MCNC: 0.3 MG/DL — SIGNIFICANT CHANGE UP (ref 0.2–1.2)
BLOOD GAS VENOUS COMPREHENSIVE RESULT: SIGNIFICANT CHANGE UP
BUN SERPL-MCNC: 22 MG/DL — SIGNIFICANT CHANGE UP (ref 7–23)
CALCIUM SERPL-MCNC: 11 MG/DL — HIGH (ref 8.4–10.5)
CHLORIDE BLDV-SCNC: 102 MMOL/L — SIGNIFICANT CHANGE UP (ref 96–108)
CHLORIDE SERPL-SCNC: 99 MMOL/L — SIGNIFICANT CHANGE UP (ref 98–107)
CK MB BLD-MCNC: 1.3 % — SIGNIFICANT CHANGE UP (ref 0–2.5)
CK MB CFR SERPL CALC: 1.8 NG/ML — SIGNIFICANT CHANGE UP
CK SERPL-CCNC: 143 U/L — SIGNIFICANT CHANGE UP (ref 30–200)
CO2 BLDV-SCNC: 35.4 MMOL/L — HIGH (ref 22–26)
CO2 SERPL-SCNC: 29 MMOL/L — SIGNIFICANT CHANGE UP (ref 22–31)
CREAT SERPL-MCNC: 6.64 MG/DL — HIGH (ref 0.5–1.3)
EGFR: 8 ML/MIN/1.73M2 — LOW
EOSINOPHIL # BLD AUTO: 0.27 K/UL — SIGNIFICANT CHANGE UP (ref 0–0.5)
EOSINOPHIL NFR BLD AUTO: 6.3 % — HIGH (ref 0–6)
ESTIMATED AVERAGE GLUCOSE: 111 — SIGNIFICANT CHANGE UP
FLUAV AG NPH QL: SIGNIFICANT CHANGE UP
FLUBV AG NPH QL: SIGNIFICANT CHANGE UP
GAS PNL BLDV: 137 MMOL/L — SIGNIFICANT CHANGE UP (ref 136–145)
GLUCOSE BLDV-MCNC: 85 MG/DL — SIGNIFICANT CHANGE UP (ref 70–99)
GLUCOSE SERPL-MCNC: 92 MG/DL — SIGNIFICANT CHANGE UP (ref 70–99)
HCO3 BLDV-SCNC: 34 MMOL/L — HIGH (ref 22–29)
HCT VFR BLD CALC: 36.6 % — LOW (ref 39–50)
HCT VFR BLDA CALC: 35 % — LOW (ref 39–51)
HGB BLD CALC-MCNC: 11.8 G/DL — LOW (ref 13–17)
HGB BLD-MCNC: 11.7 G/DL — LOW (ref 13–17)
IANC: 2.22 K/UL — SIGNIFICANT CHANGE UP (ref 1.8–7.4)
IMM GRANULOCYTES NFR BLD AUTO: 0.2 % — SIGNIFICANT CHANGE UP (ref 0–1.5)
INR BLD: 0.91 RATIO — SIGNIFICANT CHANGE UP (ref 0.88–1.16)
LACTATE BLDV-MCNC: 0.6 MMOL/L — SIGNIFICANT CHANGE UP (ref 0.5–2)
LYMPHOCYTES # BLD AUTO: 1.26 K/UL — SIGNIFICANT CHANGE UP (ref 1–3.3)
LYMPHOCYTES # BLD AUTO: 29.4 % — SIGNIFICANT CHANGE UP (ref 13–44)
MCHC RBC-ENTMCNC: 29.4 PG — SIGNIFICANT CHANGE UP (ref 27–34)
MCHC RBC-ENTMCNC: 32 GM/DL — SIGNIFICANT CHANGE UP (ref 32–36)
MCV RBC AUTO: 92 FL — SIGNIFICANT CHANGE UP (ref 80–100)
MONOCYTES # BLD AUTO: 0.51 K/UL — SIGNIFICANT CHANGE UP (ref 0–0.9)
MONOCYTES NFR BLD AUTO: 11.9 % — SIGNIFICANT CHANGE UP (ref 2–14)
NEUTROPHILS # BLD AUTO: 2.22 K/UL — SIGNIFICANT CHANGE UP (ref 1.8–7.4)
NEUTROPHILS NFR BLD AUTO: 52 % — SIGNIFICANT CHANGE UP (ref 43–77)
NRBC # BLD: 0 /100 WBCS — SIGNIFICANT CHANGE UP
NRBC # FLD: 0 K/UL — SIGNIFICANT CHANGE UP
PCO2 BLDV: 57 MMHG — HIGH (ref 42–55)
PH BLDV: 7.38 — SIGNIFICANT CHANGE UP (ref 7.32–7.43)
PLATELET # BLD AUTO: 116 K/UL — LOW (ref 150–400)
PO2 BLDV: <20 MMHG — SIGNIFICANT CHANGE UP
POTASSIUM BLDV-SCNC: 5.1 MMOL/L — SIGNIFICANT CHANGE UP (ref 3.5–5.1)
POTASSIUM SERPL-MCNC: 5 MMOL/L — SIGNIFICANT CHANGE UP (ref 3.5–5.3)
POTASSIUM SERPL-SCNC: 5 MMOL/L — SIGNIFICANT CHANGE UP (ref 3.5–5.3)
PROT SERPL-MCNC: 8 G/DL — SIGNIFICANT CHANGE UP (ref 6–8.3)
PROTHROM AB SERPL-ACNC: 10.6 SEC — SIGNIFICANT CHANGE UP (ref 10.5–13.4)
RBC # BLD: 3.98 M/UL — LOW (ref 4.2–5.8)
RBC # FLD: 13.2 % — SIGNIFICANT CHANGE UP (ref 10.3–14.5)
RSV RNA NPH QL NAA+NON-PROBE: SIGNIFICANT CHANGE UP
SAO2 % BLDV: 22.8 % — SIGNIFICANT CHANGE UP
SARS-COV-2 RNA SPEC QL NAA+PROBE: SIGNIFICANT CHANGE UP
SODIUM SERPL-SCNC: 138 MMOL/L — SIGNIFICANT CHANGE UP (ref 135–145)
TROPONIN T, HIGH SENSITIVITY RESULT: 66 NG/L — CRITICAL HIGH
TSH SERPL-MCNC: 4.86 UIU/ML — HIGH (ref 0.27–4.2)
WBC # BLD: 4.28 K/UL — SIGNIFICANT CHANGE UP (ref 3.8–10.5)
WBC # FLD AUTO: 4.28 K/UL — SIGNIFICANT CHANGE UP (ref 3.8–10.5)

## 2022-05-27 PROCEDURE — 71045 X-RAY EXAM CHEST 1 VIEW: CPT | Mod: 26

## 2022-05-27 PROCEDURE — 99291 CRITICAL CARE FIRST HOUR: CPT

## 2022-05-27 RX ORDER — ASPIRIN/CALCIUM CARB/MAGNESIUM 324 MG
324 TABLET ORAL ONCE
Refills: 0 | Status: DISCONTINUED | OUTPATIENT
Start: 2022-05-27 | End: 2022-05-27

## 2022-05-27 RX ORDER — CARVEDILOL PHOSPHATE 80 MG/1
6.25 CAPSULE, EXTENDED RELEASE ORAL ONCE
Refills: 0 | Status: COMPLETED | OUTPATIENT
Start: 2022-05-27 | End: 2022-05-27

## 2022-05-27 RX ADMIN — CARVEDILOL PHOSPHATE 6.25 MILLIGRAM(S): 80 CAPSULE, EXTENDED RELEASE ORAL at 21:57

## 2022-05-27 NOTE — ED ADULT NURSE REASSESSMENT NOTE - NS ED NURSE REASSESS COMMENT FT1
pt received, comfortable, aox4 in stretcher. no complaints at this time. pt reports he had his full session of dialysis today. AV Fistula to left forearm. call bell within reach. remains on tele. will continue to monitor.

## 2022-05-27 NOTE — ED PROVIDER NOTE - CLINICAL SUMMARY MEDICAL DECISION MAKING FREE TEXT BOX
pt with Hx of  DM, HLD, ESRD presenting with complaints of chest pain which was present 2 days ago during dialysis. mentioned it during his session today, which he completed, and was sent to the ED following. with new inverted T waves and depression in v4-v6. no active complaints. cardiology made aware following ekg. asa given by EMS. will obtain cardiac w/u, cardiology recommendations and admit.

## 2022-05-27 NOTE — ED ADULT NURSE NOTE - OBJECTIVE STATEMENT
Eladia RN: Received pt in Tr C, pt A&Ox4, respirations even and unlabored b/l. Pt reports chest pain on Wednesday that has resolved. Denies SOB, dizziness/lightheadedness, n/v, blurry vision. Denies chest pain/discomfort at this time. States "I feel like a new man." Pt with L. AV fistula, dialysis MWF, last dialysis today. Cardiology c/s at bedside. Repeat EKG completed. IVL 20g Angiocath placed on right AC. Labs sent. PMHx ESRD, diabetes, htn. Report endorsed to primary RN Mickey. Eladia RN: Received pt in Tr C, pt A&Ox4, respirations even and unlabored b/l. Pt reports chest pain on Wednesday that has resolved. Denies SOB, dizziness/lightheadedness, n/v, blurry vision. Denies chest pain/discomfort at this time. States "I feel like a new man." Pt with L. AV fistula, dialysis MWF, last dialysis today. Cardiology c/s at bedside. Repeat EKG completed. Pt given 324mg of aspirin by EMS en route. IVL 20g Angiocath placed on right AC. Labs sent. PMHx ESRD, diabetes, htn. Report endorsed to primary RN Mickey.

## 2022-05-27 NOTE — CONSULT NOTE ADULT - ASSESSMENT
86 yo M with PMH HTN, DM, HLD, ESRD (MWF) presents from his dialysis center for recent history of transient chest pain during HD 2 days prior to presentation.    Plan  -case discussed w/ interventionist (Dr. Lopez), low suspicion for acute or late presenting ACS given patient's presentation and ECG; CKMB WNL  -chest pain possibly in the setting of fluid shifts during HD vs CAD/microvascular disease vs HoTN during HD vs noncardiac etiologies; ECG not consistent w/ pericarditis  -ECG w/ LVH and patient presenting with poorly controlled HTN; recommend c/w home norvasc 10mg QD, can initiate coreg 6.25mg BID  -in the setting of multiple CAD risk factors (poorly controlled HTN, DM, ESRD, HLD), can increase statin intensity from simvastatin 20mg QD to atorvastatin 40mg QD and initiate ASA 81mg QD  -CKMB WNL which is also inconsistent with late presenting ACS; can trend troponin and repeat ECG (though a mild troponin elevation may be normal in this patient w/ ESRD)  -if patient being admitted, can obtain formal TTE  -obtain TSH, a1c, lipid profile

## 2022-05-27 NOTE — ED PROVIDER NOTE - CRITICAL CARE ATTENDING CONTRIBUTION TO CARE
Dr. Grier:  I have personally performed a face to face bedside history and physical examination of this patient. I have discussed the history, examination, review of systems, assessment and plan of management with the resident. I have reviewed the electronic medical record and amended it to reflect my history, review of systems, physical exam, assessment and plan.    87M h/o HTN, DM, HLD, ESRD on HD M/W/F, sent in by dialysis for reported chest pain episode two days prior.  Pt states that chest pains self-resolved, no further episodes since two days ago.  Denies fever/chills ,sob, n/v/d.  Had full dialysis session today.    Exam:  - nad  - rrr  - ctab  - abd soft ntnd    A/P  - CP self-resolved, EKG with new V4-V6 TWI and depressions, concern for ACS  - front ED doc called cardiology for STEMI consult, determined low likelihood of STEMI currently, not an emergent cath candidate at this time  - cbc, cmp, ckmb, trop, coags, ekg, CXR

## 2022-05-27 NOTE — ED ADULT TRIAGE NOTE - CHIEF COMPLAINT QUOTE
pt was at dialysis and told staff that he had chest pain on Wednesday, called EMS. upon EMS arrival had abnormal EKG. pt denies any pain at this time, no palpitations, sob, dizziness, headache. pt in no distress. hx. DM, HTN, ESRD M/W/F completed dialysis today. L AV fistula

## 2022-05-27 NOTE — ED PROVIDER NOTE - RAPID ASSESSMENT
ekg with v-1 to b-3 elevations and v4-6 depressions, changed from previous, has dynamic changes from previous and episode of chest pain 2 days ago, cardiology notified. ekg handed to red attending. no active chest pain    (Yosef Jackson MD; attending emergency medicine and medical toxicology)

## 2022-05-27 NOTE — ED PROVIDER NOTE - OBJECTIVE STATEMENT
88yo M Hx of  HTN , DM, HLD, ESRD on MWF (full session today) presenting with complaints of chest pain. pt reports that 2 days ago (on wednesday) while at dialysis he had an episode of chest pain, no sob, n/v. chest pain resolved and he has been chest pain free since that time. he went to dialysis today and had a full session of dialysis today, while there he mentioned to staff that he had chest pain at his previous session and was referred to the ED following his full session. no f/c, current chest pain, n/v, abd pain. ekg with new T wave inversions and likd depression in v4-v6. cardiology alerted upon ekg. given 324mg of asa from EMS

## 2022-05-27 NOTE — CONSULT NOTE ADULT - SUBJECTIVE AND OBJECTIVE BOX
Patient seen and evaluated at bedside    HPI: 88 yo M with PMH HTN, DM, HLD, ESRD (MWF) presents from his dialysis center. Patient underwent HD on Wednesday. During the session, he experienced L chest pain, sharp, lasting a couple of hours. Pain eventually resolved, and patient returned to baseline. Today, during his HD session, he notified staff of the chest pain experienced on Wednesday (no chest pain experienced during today's HD session, however), and he was transferred by EMS to the ED. Denies dyspnea, orthopnea, LE edema, diaphoresis, palpitations, exertional CP, prior angiograms, f/c/n/v/d/c.    In the ED, VS T: 97.9, P: 78, BP: 185/83, RR: 16, O2: 99%RA. Patient currently denying CP, dyspnea, palpitations, presyncope, syncope, HA, ab pain.    PMHx:   Diabetes    HTN (hypertension)    ESRD (end stage renal disease)      PSHx:   AVF (arteriovenous fistula)      FAMILY HISTORY:    Allergies:  No Known Allergies    Home Medications:  amLODIPine 10 mg oral tablet: 1 tab(s) orally once a day (05 Feb 2021 11:46)  calcium acetate 667 mg oral tablet: 1 tab(s) orally 3 times a day (05 Feb 2021 11:49)  folic acid 1 mg oral tablet: 1 tab(s) orally once a day (05 Feb 2021 11:45)  simvastatin 20 mg oral tablet: 1 tab(s) orally once a day (at bedtime) (05 Feb 2021 11:45)    Current Medications:     Social History  Smoking History: denies  Alcohol Use: denies  Drug Use: denies    REVIEW OF SYSTEMS:  Constitutional:     [X] negative [ ] fevers [ ] chills [ ] weight loss [ ] weight gain  HEENT:                  [X] negative [ ] dry eyes [ ] eye irritation [ ] postnasal drip [ ] nasal congestion  CV:                         [X] negative  [ ] chest pain [ ] orthopnea [ ] palpitations [ ] murmur  Resp:                     [X] negative [ ] cough [ ] shortness of breath [ ] wheezing [ ] sputum [ ] hemoptysis  GI:                          [X] negative [ ] nausea [ ] vomiting [ ] diarrhea [ ] constipation [ ] abd pain [ ] dysphagia   :                        [X] negative [ ] dysuria [ ] nocturia [ ] hematuria [ ] increased urinary frequency  MSK:                      [X] negative [ ] back pain [ ] myalgias [ ] arthralgias [ ] fracture  Skin:                       [X] negative [ ] rash [ ] itch  Neuro:                   [X] negative [ ] headache [ ] dizziness [ ] syncope [ ] weakness [ ] numbness  Psych:                    [X] negative [ ] anxiety [ ] depression  Endo:                     [X] negative [ ] diabetes [ ] thyroid problem  Heme/Lymph:      [X] negative [ ] anemia [ ] bleeding problem  Allergic/Immune: [X] negative [ ] itchy eyes [ ] nasal discharge [ ] hives [ ] angioedema    [X] All other systems negative or otherwise described above.  [ ] Unable to assess ROS because ________.    ICU Vital Signs Last 24 Hrs  T(C): 36.6 (27 May 2022 18:27), Max: 36.6 (27 May 2022 18:27)  T(F): 97.9 (27 May 2022 18:27), Max: 97.9 (27 May 2022 18:27)  HR: 81 (27 May 2022 19:23) (78 - 81)  BP: 180/95 (27 May 2022 19:23) (180/95 - 185/83)  BP(mean): --  ABP: --  ABP(mean): --  RR: 18 (27 May 2022 19:23) (16 - 18)  SpO2: 99% (27 May 2022 19:23) (99% - 99%)    Orthostatic VS    Daily Height in cm: 172.72 (27 May 2022 18:27)    Daily   I&O's Summary      Physical Exam:  GENERAL: No acute distress, well-developed  HEAD:  Atraumatic, Normocephalic  ENT: EOMI, conjunctiva and sclera clear, Neck supple, No JVD, moist mucosa  CHEST/LUNG: Clear to auscultation bilaterally; No wheeze, equal breath sounds bilaterally   BACK: No spinal tenderness  HEART: Regular rate and rhythm; No murmurs, rubs, or gallops, radial and DP 2+ b/l, euvolemic  ABDOMEN: Soft, Nontender, Nondistended  EXTREMITIES:  No clubbing, cyanosis, or edema. L arm fistula w/ palpable thrill  PSYCH: Nl behavior, nl affect  NEUROLOGY: AAOx3, non-focal  SKIN: Normal color, No rashes or lesions  LINES: no central lines present    LABS:                        11.7   4.28  )-----------( 116      ( 27 May 2022 19:11 )             36.6     PT/INR - ( 27 May 2022 19:11 )   PT: 10.6 sec;   INR: 0.91 ratio         PTT - ( 27 May 2022 19:11 )  PTT:33.7 sec            BNP:   proBNP:   Lipid Profile:     HgA1c:   TSH:         RADIOLOGY & ADDITIONAL STUDIES:    Cardiovascular Diagnostic Testing    ECG: Personally reviewed  5/27: NSR, LVH w/ repolarization abnormality    Telemetry: reviewed, NSR    Echo: none    Stress Testing: none    Cath: none    CXR: Personally reviewed  < from: Xray Chest 1 View-PORTABLE IMMEDIATE (Xray Chest 1 View-PORTABLE IMMEDIATE .) (05.27.22 @ 19:24) >  IMPRESSION:    Clear lungs.    < end of copied text >    Other cardiac imaging: none    Other misc imaging: none

## 2022-05-28 ENCOUNTER — TRANSCRIPTION ENCOUNTER (OUTPATIENT)
Age: 87
End: 2022-05-28

## 2022-05-28 DIAGNOSIS — Z29.9 ENCOUNTER FOR PROPHYLACTIC MEASURES, UNSPECIFIED: ICD-10-CM

## 2022-05-28 DIAGNOSIS — R79.89 OTHER SPECIFIED ABNORMAL FINDINGS OF BLOOD CHEMISTRY: ICD-10-CM

## 2022-05-28 DIAGNOSIS — I10 ESSENTIAL (PRIMARY) HYPERTENSION: ICD-10-CM

## 2022-05-28 DIAGNOSIS — N18.6 END STAGE RENAL DISEASE: ICD-10-CM

## 2022-05-28 DIAGNOSIS — R07.9 CHEST PAIN, UNSPECIFIED: ICD-10-CM

## 2022-05-28 DIAGNOSIS — E11.9 TYPE 2 DIABETES MELLITUS WITHOUT COMPLICATIONS: ICD-10-CM

## 2022-05-28 LAB
A1C WITH ESTIMATED AVERAGE GLUCOSE RESULT: 5.2 % — SIGNIFICANT CHANGE UP (ref 4–5.6)
ALBUMIN SERPL ELPH-MCNC: 4.1 G/DL — SIGNIFICANT CHANGE UP (ref 3.3–5)
ALP SERPL-CCNC: 46 U/L — SIGNIFICANT CHANGE UP (ref 40–120)
ALT FLD-CCNC: 9 U/L — SIGNIFICANT CHANGE UP (ref 4–41)
ANION GAP SERPL CALC-SCNC: 13 MMOL/L — SIGNIFICANT CHANGE UP (ref 7–14)
AST SERPL-CCNC: 17 U/L — SIGNIFICANT CHANGE UP (ref 4–40)
BASOPHILS # BLD AUTO: 0.02 K/UL — SIGNIFICANT CHANGE UP (ref 0–0.2)
BASOPHILS NFR BLD AUTO: 0.5 % — SIGNIFICANT CHANGE UP (ref 0–2)
BILIRUB SERPL-MCNC: 0.4 MG/DL — SIGNIFICANT CHANGE UP (ref 0.2–1.2)
BUN SERPL-MCNC: 27 MG/DL — HIGH (ref 7–23)
CALCIUM SERPL-MCNC: 10.3 MG/DL — SIGNIFICANT CHANGE UP (ref 8.4–10.5)
CHLORIDE SERPL-SCNC: 99 MMOL/L — SIGNIFICANT CHANGE UP (ref 98–107)
CHOLEST SERPL-MCNC: 128 MG/DL — SIGNIFICANT CHANGE UP
CO2 SERPL-SCNC: 26 MMOL/L — SIGNIFICANT CHANGE UP (ref 22–31)
CREAT SERPL-MCNC: 8.49 MG/DL — HIGH (ref 0.5–1.3)
EGFR: 6 ML/MIN/1.73M2 — LOW
EOSINOPHIL # BLD AUTO: 0.28 K/UL — SIGNIFICANT CHANGE UP (ref 0–0.5)
EOSINOPHIL NFR BLD AUTO: 6.7 % — HIGH (ref 0–6)
ESTIMATED AVERAGE GLUCOSE: 103 — SIGNIFICANT CHANGE UP
GLUCOSE BLDC GLUCOMTR-MCNC: 113 MG/DL — HIGH (ref 70–99)
GLUCOSE BLDC GLUCOMTR-MCNC: 117 MG/DL — HIGH (ref 70–99)
GLUCOSE BLDC GLUCOMTR-MCNC: 82 MG/DL — SIGNIFICANT CHANGE UP (ref 70–99)
GLUCOSE BLDC GLUCOMTR-MCNC: 87 MG/DL — SIGNIFICANT CHANGE UP (ref 70–99)
GLUCOSE BLDC GLUCOMTR-MCNC: 98 MG/DL — SIGNIFICANT CHANGE UP (ref 70–99)
GLUCOSE SERPL-MCNC: 78 MG/DL — SIGNIFICANT CHANGE UP (ref 70–99)
HCT VFR BLD CALC: 36.1 % — LOW (ref 39–50)
HDLC SERPL-MCNC: 50 MG/DL — SIGNIFICANT CHANGE UP
HGB BLD-MCNC: 11.7 G/DL — LOW (ref 13–17)
IANC: 2.4 K/UL — SIGNIFICANT CHANGE UP (ref 1.8–7.4)
IMM GRANULOCYTES NFR BLD AUTO: 0.5 % — SIGNIFICANT CHANGE UP (ref 0–1.5)
LIPID PNL WITH DIRECT LDL SERPL: 63 MG/DL — SIGNIFICANT CHANGE UP
LYMPHOCYTES # BLD AUTO: 1.09 K/UL — SIGNIFICANT CHANGE UP (ref 1–3.3)
LYMPHOCYTES # BLD AUTO: 25.9 % — SIGNIFICANT CHANGE UP (ref 13–44)
MAGNESIUM SERPL-MCNC: 2.2 MG/DL — SIGNIFICANT CHANGE UP (ref 1.6–2.6)
MCHC RBC-ENTMCNC: 28.5 PG — SIGNIFICANT CHANGE UP (ref 27–34)
MCHC RBC-ENTMCNC: 32.4 GM/DL — SIGNIFICANT CHANGE UP (ref 32–36)
MCV RBC AUTO: 87.8 FL — SIGNIFICANT CHANGE UP (ref 80–100)
MONOCYTES # BLD AUTO: 0.4 K/UL — SIGNIFICANT CHANGE UP (ref 0–0.9)
MONOCYTES NFR BLD AUTO: 9.5 % — SIGNIFICANT CHANGE UP (ref 2–14)
NEUTROPHILS # BLD AUTO: 2.4 K/UL — SIGNIFICANT CHANGE UP (ref 1.8–7.4)
NEUTROPHILS NFR BLD AUTO: 56.9 % — SIGNIFICANT CHANGE UP (ref 43–77)
NON HDL CHOLESTEROL: 78 MG/DL — SIGNIFICANT CHANGE UP
NRBC # BLD: 0 /100 WBCS — SIGNIFICANT CHANGE UP
NRBC # FLD: 0 K/UL — SIGNIFICANT CHANGE UP
PHOSPHATE SERPL-MCNC: 3.8 MG/DL — SIGNIFICANT CHANGE UP (ref 2.5–4.5)
PLATELET # BLD AUTO: 108 K/UL — LOW (ref 150–400)
POTASSIUM SERPL-MCNC: 4.5 MMOL/L — SIGNIFICANT CHANGE UP (ref 3.5–5.3)
POTASSIUM SERPL-SCNC: 4.5 MMOL/L — SIGNIFICANT CHANGE UP (ref 3.5–5.3)
PROT SERPL-MCNC: 7.1 G/DL — SIGNIFICANT CHANGE UP (ref 6–8.3)
RBC # BLD: 4.11 M/UL — LOW (ref 4.2–5.8)
RBC # FLD: 13.1 % — SIGNIFICANT CHANGE UP (ref 10.3–14.5)
SODIUM SERPL-SCNC: 138 MMOL/L — SIGNIFICANT CHANGE UP (ref 135–145)
T3 SERPL-MCNC: 84 NG/DL — SIGNIFICANT CHANGE UP (ref 80–200)
T4 AB SER-ACNC: 7.65 UG/DL — SIGNIFICANT CHANGE UP (ref 5.1–13)
TRIGL SERPL-MCNC: 74 MG/DL — SIGNIFICANT CHANGE UP
TROPONIN T, HIGH SENSITIVITY RESULT: 71 NG/L — CRITICAL HIGH
TSH SERPL-MCNC: 5.09 UIU/ML — HIGH (ref 0.27–4.2)
WBC # BLD: 4.21 K/UL — SIGNIFICANT CHANGE UP (ref 3.8–10.5)
WBC # FLD AUTO: 4.21 K/UL — SIGNIFICANT CHANGE UP (ref 3.8–10.5)

## 2022-05-28 PROCEDURE — 12345: CPT | Mod: NC

## 2022-05-28 PROCEDURE — 99232 SBSQ HOSP IP/OBS MODERATE 35: CPT

## 2022-05-28 PROCEDURE — 99223 1ST HOSP IP/OBS HIGH 75: CPT

## 2022-05-28 RX ORDER — INSULIN LISPRO 100/ML
VIAL (ML) SUBCUTANEOUS AT BEDTIME
Refills: 0 | Status: DISCONTINUED | OUTPATIENT
Start: 2022-05-28 | End: 2022-05-29

## 2022-05-28 RX ORDER — ACETAMINOPHEN 500 MG
650 TABLET ORAL EVERY 6 HOURS
Refills: 0 | Status: DISCONTINUED | OUTPATIENT
Start: 2022-05-28 | End: 2022-05-29

## 2022-05-28 RX ORDER — DEXTROSE 50 % IN WATER 50 %
25 SYRINGE (ML) INTRAVENOUS ONCE
Refills: 0 | Status: DISCONTINUED | OUTPATIENT
Start: 2022-05-28 | End: 2022-05-29

## 2022-05-28 RX ORDER — LANOLIN ALCOHOL/MO/W.PET/CERES
3 CREAM (GRAM) TOPICAL AT BEDTIME
Refills: 0 | Status: DISCONTINUED | OUTPATIENT
Start: 2022-05-28 | End: 2022-05-29

## 2022-05-28 RX ORDER — SODIUM CHLORIDE 9 MG/ML
1000 INJECTION, SOLUTION INTRAVENOUS
Refills: 0 | Status: DISCONTINUED | OUTPATIENT
Start: 2022-05-28 | End: 2022-05-29

## 2022-05-28 RX ORDER — ATORVASTATIN CALCIUM 80 MG/1
40 TABLET, FILM COATED ORAL AT BEDTIME
Refills: 0 | Status: DISCONTINUED | OUTPATIENT
Start: 2022-05-28 | End: 2022-05-29

## 2022-05-28 RX ORDER — DEXTROSE 50 % IN WATER 50 %
12.5 SYRINGE (ML) INTRAVENOUS ONCE
Refills: 0 | Status: DISCONTINUED | OUTPATIENT
Start: 2022-05-28 | End: 2022-05-29

## 2022-05-28 RX ORDER — AMLODIPINE BESYLATE 2.5 MG/1
10 TABLET ORAL DAILY
Refills: 0 | Status: DISCONTINUED | OUTPATIENT
Start: 2022-05-28 | End: 2022-05-29

## 2022-05-28 RX ORDER — DEXTROSE 50 % IN WATER 50 %
15 SYRINGE (ML) INTRAVENOUS ONCE
Refills: 0 | Status: DISCONTINUED | OUTPATIENT
Start: 2022-05-28 | End: 2022-05-29

## 2022-05-28 RX ORDER — CARVEDILOL PHOSPHATE 80 MG/1
6.25 CAPSULE, EXTENDED RELEASE ORAL EVERY 12 HOURS
Refills: 0 | Status: DISCONTINUED | OUTPATIENT
Start: 2022-05-28 | End: 2022-05-29

## 2022-05-28 RX ORDER — FOLIC ACID 0.8 MG
1 TABLET ORAL DAILY
Refills: 0 | Status: DISCONTINUED | OUTPATIENT
Start: 2022-05-28 | End: 2022-05-29

## 2022-05-28 RX ORDER — GLUCAGON INJECTION, SOLUTION 0.5 MG/.1ML
1 INJECTION, SOLUTION SUBCUTANEOUS ONCE
Refills: 0 | Status: DISCONTINUED | OUTPATIENT
Start: 2022-05-28 | End: 2022-05-29

## 2022-05-28 RX ORDER — ASPIRIN/CALCIUM CARB/MAGNESIUM 324 MG
81 TABLET ORAL DAILY
Refills: 0 | Status: DISCONTINUED | OUTPATIENT
Start: 2022-05-28 | End: 2022-05-29

## 2022-05-28 RX ORDER — INSULIN LISPRO 100/ML
VIAL (ML) SUBCUTANEOUS
Refills: 0 | Status: DISCONTINUED | OUTPATIENT
Start: 2022-05-28 | End: 2022-05-29

## 2022-05-28 RX ADMIN — CARVEDILOL PHOSPHATE 6.25 MILLIGRAM(S): 80 CAPSULE, EXTENDED RELEASE ORAL at 17:36

## 2022-05-28 RX ADMIN — CARVEDILOL PHOSPHATE 6.25 MILLIGRAM(S): 80 CAPSULE, EXTENDED RELEASE ORAL at 06:12

## 2022-05-28 RX ADMIN — Medication 81 MILLIGRAM(S): at 17:36

## 2022-05-28 RX ADMIN — Medication 1 MILLIGRAM(S): at 17:36

## 2022-05-28 RX ADMIN — ATORVASTATIN CALCIUM 40 MILLIGRAM(S): 80 TABLET, FILM COATED ORAL at 22:16

## 2022-05-28 RX ADMIN — AMLODIPINE BESYLATE 10 MILLIGRAM(S): 2.5 TABLET ORAL at 06:12

## 2022-05-28 NOTE — H&P ADULT - NSHPLABSRESULTS_GEN_ALL_CORE
Vital Signs Last 24 Hrs  T(C): 36.8 (28 May 2022 02:10), Max: 36.8 (28 May 2022 02:10)  T(F): 98.3 (28 May 2022 02:10), Max: 98.3 (28 May 2022 02:10)  HR: 72 (28 May 2022 02:10) (72 - 81)  BP: 153/70 (28 May 2022 02:10) (153/70 - 185/83)  RR: 17 (28 May 2022 02:10) (16 - 18)  SpO2: 100% (28 May 2022 02:10) (99% - 100%)                          11.7   4.28  )-----------( 116      ( 27 May 2022 19:11 )             36.6      05-27    138  |  99  |  22  ----------------------------<  92  5.0   |  29  |  6.64<H>    Ca    11.0<H>      27 May 2022 19:11    TPro  8.0  /  Alb  4.5  /  TBili  0.3  /  DBili  x   /  AST  29  /  ALT  10  /  AlkPhos  54  05-27    EKG with LVH  HS Trop: 66  TSH 4.86

## 2022-05-28 NOTE — DISCHARGE NOTE PROVIDER - CARE PROVIDER_API CALL
Jorje Abreu)  Nephrology  [address] [place] [address], [address] [place] [address]  Phone: [phone]  Fax: [phone]  Follow Up Time:

## 2022-05-28 NOTE — PROGRESS NOTE ADULT - SUBJECTIVE AND OBJECTIVE BOX
[facility] Division of Hospital Medicine  Jenniferjosefamel LuDO  Pager (M-F, 8A-5P): [phone]      Patient is a 87y old  Male who presents with a chief complaint of Chest Pain (28 May 2022 08:36)      SUBJECTIVE / OVERNIGHT EVENTS: no overnight events, pt denies any chest pain or sob today, states the pain was on Wednesday, does not understand why he was sent to hospital yesterday.   ADDITIONAL REVIEW OF SYSTEMS: no cp/sob     MEDICATIONS  (STANDING):  amLODIPine   Tablet 10 milliGRAM(s) Oral daily  aspirin  chewable 81 milliGRAM(s) Oral daily  atorvastatin 40 milliGRAM(s) Oral at bedtime  carvedilol 6.25 milliGRAM(s) Oral every 12 hours  dextrose 5%. 1000 milliLiter(s) (100 mL/Hr) IV Continuous <Continuous>  dextrose 5%. 1000 milliLiter(s) (50 mL/Hr) IV Continuous <Continuous>  dextrose 50% Injectable 25 Gram(s) IV Push once  dextrose 50% Injectable 12.5 Gram(s) IV Push once  dextrose 50% Injectable 25 Gram(s) IV Push once  folic acid 1 milliGRAM(s) Oral daily  glucagon  Injectable 1 milliGRAM(s) IntraMuscular once  insulin lispro (ADMELOG) corrective regimen sliding scale   SubCutaneous three times a day before meals  insulin lispro (ADMELOG) corrective regimen sliding scale   SubCutaneous at bedtime    MEDICATIONS  (PRN):  acetaminophen     Tablet .. 650 milliGRAM(s) Oral every 6 hours PRN Temp greater or equal to 38C (100.4F), Mild Pain (1 - 3)  dextrose Oral Gel 15 Gram(s) Oral once PRN Blood Glucose LESS THAN 70 milliGRAM(s)/deciliter  melatonin 3 milliGRAM(s) Oral at bedtime PRN Insomnia      CAPILLARY BLOOD GLUCOSE      POCT Blood Glucose.: 82 mg/dL (28 May 2022 08:35)  POCT Blood Glucose.: 87 mg/dL (28 May 2022 02:40)  POCT Blood Glucose.: 103 mg/dL (27 May 2022 18:33)    I&O's Summary    28 May 2022 07:01  -  28 May 2022 12:29  --------------------------------------------------------  IN: 240 mL / OUT: 0 mL / NET: 240 mL        PHYSICAL EXAM:  Vital Signs Last 24 Hrs  T(C): 36.4 (28 May 2022 11:44), Max: 36.8 (28 May 2022 02:10)  T(F): 97.5 (28 May 2022 11:44), Max: 98.3 (28 May 2022 02:10)  HR: 71 (28 May 2022 11:44) (65 - 81)  BP: 146/68 (28 May 2022 11:44) (143/67 - 185/83)  BP(mean): --  RR: 18 (28 May 2022 11:44) (16 - 18)  SpO2: 99% (28 May 2022 11:44) (99% - 100%)  CONSTITUTIONAL: NAD, well-appearing   HEENT: EOMI, MMM, no jvd  RESPIRATORY: Normal respiratory effort; lungs are clear to auscultation bilaterally  CARDIOVASCULAR: Regular rate and rhythm, normal S1 and S2, no murmurs  ABDOMEN: Nontender to palpation, normoactive bowel sounds, no rebound/guarding; No hepatosplenomegaly  MUSKULOSKELETAL:  +LUE AVF   PSYCH: A+O to person, place, and time; affect appropriate  NEUROLOGY: CN 2-12 are intact and symmetric; no gross sensory deficits;   SKIN: No rashes; no palpable lesions    LABS:                        11.7   4.21  )-----------( 108      ( 28 May 2022 06:41 )             36.1     05-28    138  |  99  |  27<H>  ----------------------------<  78  4.5   |  26  |  8.49<H>    Ca    10.3      28 May 2022 06:41  Phos  3.8     05-28  Mg     2.20     05-28    TPro  7.1  /  Alb  4.1  /  TBili  0.4  /  DBili  x   /  AST  17  /  ALT  9   /  AlkPhos  46  05-28    PT/INR - ( 27 May 2022 19:11 )   PT: 10.6 sec;   INR: 0.91 ratio         PTT - ( 27 May 2022 19:11 )  PTT:33.7 sec  CARDIAC MARKERS ( 27 May 2022 19:11 )  x     / x     / 143 U/L / x     / 1.8 ng/mL            RADIOLOGY & ADDITIONAL TESTS:  Results Reviewed:   Imaging Personally Reviewed:  Electrocardiogram Personally Reviewed:    COORDINATION OF CARE:  Care Discussed with Consultants/Other Providers [Y/N]: Y  Prior or Outpatient Records Reviewed [Y/N]: Y

## 2022-05-28 NOTE — H&P ADULT - NEGATIVE NEUROLOGICAL SYMPTOMS
no weakness/no paresthesias/no focal seizures/no syncope/no tremors/no loss of sensation/no difficulty walking/no headache/no loss of consciousness/no hemiparesis/no confusion/no facial palsy

## 2022-05-28 NOTE — PHYSICAL THERAPY INITIAL EVALUATION ADULT - PERTINENT HX OF CURRENT PROBLEM, REHAB EVAL
86 y/o Male with a past medical history of DMII, HTN, HLD and ESRD (HD MWF) presenting with chest pain which occurred and resolved on Wednesday The patient states that he during dialysis Wednesdays afternoon he began to feel 4/10 nonradiating left sided chest pain. He states the pain was nonpositional, nonpleuritic.

## 2022-05-28 NOTE — PATIENT PROFILE ADULT - FALL HARM RISK - UNIVERSAL INTERVENTIONS
Bed in lowest position, wheels locked, appropriate side rails in place/Call bell, personal items and telephone in reach/Instruct patient to call for assistance before getting out of bed or chair/Non-slip footwear when patient is out of bed/[place] to call system/Physically safe environment - no spills, clutter or unnecessary equipment/Purposeful Proactive Rounding/Room/bathroom lighting operational, light cord in reach

## 2022-05-28 NOTE — DISCHARGE NOTE PROVIDER - NSDCFUADDAPPT_GEN_ALL_CORE_FT
Given significant vascular risk factors, recommend outpatient stress test for further evaluation - f/u with your PCP and cardiologist call for appointment

## 2022-05-28 NOTE — H&P ADULT - ASSESSMENT
88 y/o Male with a past medical history of DMII, HTN, HLD and ESRD (HD MWF) presenting with chest pain which occurred and resolved on Wednesday admitted to medicine for workup.

## 2022-05-28 NOTE — H&P ADULT - PROBLEM SELECTOR PLAN 1
- Pt noted to have chest pain during recent HD session, now resolved  - Cards recs appreciated  - chest pain possibly i/s/o fluid shifts during HD vs CAD/microvascular disease vs HTN during HD vs noncardiac etiologies; ECG not consistent w/ pericarditis  - ECG w/ LVH 2/2 to poorly controlled HTN, will start Coreg 6.25mg BID and c/w Amlodipine 10mg daily  - Monitor on tele  - TTE  - Trop 66 likely elevated in setting of ESRD, will repeat trop and EKG in AM

## 2022-05-28 NOTE — PROGRESS NOTE ADULT - SUBJECTIVE AND OBJECTIVE BOX
For all Cardiology service contact information, go to [email or web address] and use "[employer]" to login.    SUBJECTIVE:   No events overnight. Denies CP, SOB or Palpitations.   -------------------------------------------------------------------------------------------  ROS:  CV: chest pain (-), palpitation (-), orthopnea (-), PND (-), edema (-)  PULM: SOB (-), cough (-), wheezing (-), hemoptysis (-).   CONST: fever (-), chills (-) or fatigue (-)  GI: abdominal distension (-), abdominal pain (-) , nausea/vomiting (-), hematemesis, (-), melena (-), hematochezia (-)  : dysuria (-), frequency (-), hematuria (-).   NEURO: numbness (-), weakness (-), dizziness (-)  SKIN: itching (-), rash (-)  HEENT:  visual changes (-); vertigo or throat pain (-);  neck stiffness (-)     All other review of systems is negative unless indicated above.   -------------------------------------------------------------------------------------------  VS:  T(F): 98.3 (), Max: 98.3 ()  HR: 65 () (65 - 81)  BP: 143/67 () (143/67 - 185/83)  RR: 17 ()  SpO2: 100% ()  I&O's Summary    ------------------------------------------------------------------------------------------  PHYSICAL EXAM:  GENERAL: NAD  HEAD:  Atraumatic, Normocephalic.  EYES: EOMI, PERRLA, conjunctiva and sclera clear.  ENT: Moist mucous membranes.  NECK: Supple, No JVD.  CHEST/LUNG: Clear to auscultation bilaterally; No rales, rhonchi, wheezing, or rubs. Unlabored respirations.  HEART: Regular rate and rhythm; No murmurs, rubs, or gallops.  ABDOMEN: Bowel sounds present; Soft, Nontender, Nondistended.   EXTREMITIES:  2+ Peripheral Pulses, brisk capillary refill. No clubbing, cyanosis, or edema.  PSYCH: Normal affect.  SKIN: No rashes or lesions.  -------------------------------------------------------------------------------------------  LABS:                          11.7   4.21  )-----------( 108      ( 28 May 2022 06:41 )             36.1         138  |  99  |  27<H>  ----------------------------<  78  4.5   |  26  |  8.49<H>    Ca    10.3      28 May 2022 06:41  Phos  3.8       Mg     2.20         TPro  7.1  /  Alb  4.1  /  TBili  0.4  /  DBili  x   /  AST  17  /  ALT  9   /  AlkPhos  46      PT/INR - ( 27 May 2022 19:11 )   PT: 10.6 sec;   INR: 0.91 ratio         PTT - ( 27 May 2022 19:11 )  PTT:33.7 sec  CARDIAC MARKERS ( 28 May 2022 06:41 )  71 ng/L / x     / x     / x     / x     / x      CARDIAC MARKERS ( 27 May 2022 19:11 )  66 ng/L / x     / x     / 143 U/L / x     / 1.8 ng/mL      Total Cholesterol: 128  LDL: --  HDL: 50  T        -------------------------------------------------------------------------------------------  Meds:  acetaminophen     Tablet .. 650 milliGRAM(s) Oral every 6 hours PRN  amLODIPine   Tablet 10 milliGRAM(s) Oral daily  aspirin  chewable 81 milliGRAM(s) Oral daily  atorvastatin 40 milliGRAM(s) Oral at bedtime  carvedilol 6.25 milliGRAM(s) Oral every 12 hours  dextrose 5%. 1000 milliLiter(s) IV Continuous <Continuous>  dextrose 5%. 1000 milliLiter(s) IV Continuous <Continuous>  dextrose 50% Injectable 25 Gram(s) IV Push once  dextrose 50% Injectable 12.5 Gram(s) IV Push once  dextrose 50% Injectable 25 Gram(s) IV Push once  dextrose Oral Gel 15 Gram(s) Oral once PRN  folic acid 1 milliGRAM(s) Oral daily  glucagon  Injectable 1 milliGRAM(s) IntraMuscular once  insulin lispro (ADMELOG) corrective regimen sliding scale   SubCutaneous three times a day before meals  insulin lispro (ADMELOG) corrective regimen sliding scale   SubCutaneous at bedtime  melatonin 3 milliGRAM(s) Oral at bedtime PRN    -------------------------------------------------------------------------------------------

## 2022-05-28 NOTE — DISCHARGE NOTE PROVIDER - CARE PROVIDERS DIRECT ADDRESSES
[email or web address]

## 2022-05-28 NOTE — DISCHARGE NOTE PROVIDER - NSFOLLOWUPCLINICS_GEN_ALL_ED_FT
[facility] [place]  Internal Medicine  [address] [place] [address]  Phone: [phone]  Fax: [phone]    [facility]  Cardiology  [address] [place] [id number]  Phone: [phone]  Fax:

## 2022-05-28 NOTE — H&P ADULT - NEGATIVE ENMT SYMPTOMS
no hearing difficulty/no ear pain/no tinnitus/no vertigo/no sinus symptoms/no nasal obstruction/no nose bleeds/no gum bleeding/no dry mouth/no throat pain/no dysphagia

## 2022-05-28 NOTE — H&P ADULT - HISTORY OF PRESENT ILLNESS
86 y/o Male with a past medical history of DMII, HTN, HLD and ESRD (HD MWF) presenting with chest pain which occurred and resolved on Wednesday The patient states that he during dialysis Wednesdays afternoon he began to feel 4/10 nonradiating left sided chest pain. He states the pain was nonpositional, nonpleuritic. The patient states he alerted the dialysis technician who "adjusted some settings on the machine" and proceeded with HD. The patient then went home and went to bed later that evening but the pain had resolved by Thursday morning. On Friday, the patient alerted the supervisor of the HD center of his previous experiance who sent him to [facility] following his HD treatment for workup via EMS. The patient has no complaints currently being admitted to medicine for chest pain, now resolved.

## 2022-05-28 NOTE — PROGRESS NOTE ADULT - ATTENDING COMMENTS
Patient seen and examined during morning rounds.  Assessment and plan were reviewed with Cardiology Fellow, and as outlined above.

## 2022-05-28 NOTE — H&P ADULT - NSICDXPASTMEDICALHX_GEN_ALL_CORE_FT
PAST MEDICAL HISTORY:  Diabetes     ESRD (end stage renal disease)     HTN (hypertension) HD MWF

## 2022-05-28 NOTE — PROGRESS NOTE ADULT - ASSESSMENT
87M with PMH HTN, DM, HLD, ESRD (MWF) presents for non-anginal chest pain during HD, now resolved. ECG  Trop 66, 71. CKMB 1.8.     Plan:   1. Continue with aspirin 81 mg daily for primary prevention.   2. Transition simvastatin to atorvastatin 40 mg daily.    87M with PMH HTN, DM, HLD, ESRD (MWF) presents for non-anginal chest pain during HD, now resolved x72 hours. ECG  Trop 66, 71. CKMB 1.8.     Plan:   1. Continue with aspirin 81 mg daily for primary prevention.   2. Transition simvastatin to atorvastatin 40 mg daily.   3. Follow up TTE  4, Given significant vascular risk factors, would recommend outpatient stress test for further evaluation.

## 2022-05-28 NOTE — DISCHARGE NOTE PROVIDER - NSDCCPCAREPLAN_GEN_ALL_CORE_FT
PRINCIPAL DISCHARGE DIAGNOSIS  Diagnosis: Chest pain  Assessment and Plan of Treatment: - echo - Mild left ventricular enlargement. Estimated ejection  fraction 50%.  - Continue taking Coreg started inpatient    - continue taking  Amlodipine  -if you develop chest pain in the future seek immeidate medical attention   - Given significant vascular risk factors, would recommend outpatient stress test for further evaluation.   -follow up ith your PCP and cardiologist in 1-2 weeks call for appointment          SECONDARY DISCHARGE DIAGNOSES  Diagnosis: H/O medical treatment  Assessment and Plan of Treatment: ESRD on dialysis.   - Dialysis MWF  - last HD prior to admission on Friday   reinstated prior to discharge   - DASH/Renal/DM diet  -follow up with nephrology call for appointment   Elevated TSH.    - TSH 4.86  - Follow up thyroid studies outpatient call your PCP for appointment    Diabetes    follow up with opthamology, your PCP and podiatry   - DASH/Renal/DM diet.   HTN   - Continue home amlodipine and coreg   follow up with your PCP call for appointment

## 2022-05-28 NOTE — DISCHARGE NOTE PROVIDER - NSDCFUSCHEDAPPT_GEN_ALL_CORE_FT
[facility]  Surg [address]asc [address]  Scheduled Appointment: 07/07/2022    Savage Adams  [facility]  Surg Vasc [address]  Scheduled Appointment: 07/07/2022

## 2022-05-28 NOTE — H&P ADULT - NS ATTEND AMEND GEN_ALL_CORE FT
Pt with DM, HTN, HLD, ESRD p/w chest pain during HD now resolved  On exam: NAD, heart RRR, lungs CTA B.  No edema  Labs reviewed  Cardiology consult reviewed  Will monitor on tele  Check TTE  Coreg started

## 2022-05-28 NOTE — PROGRESS NOTE ADULT - ASSESSMENT
86 y/o Male with a past medical history of DMII, HTN, HLD and ESRD (HD MWF) presenting with chest pain which occurred and resolved on Wednesday admitted to medicine for workup.

## 2022-05-28 NOTE — DISCHARGE NOTE PROVIDER - NSDCMRMEDTOKEN_GEN_ALL_CORE_FT
amLODIPine 10 mg oral tablet: 1 tab(s) orally once a day  calcium acetate 667 mg oral tablet: 1 tab(s) orally 3 times a day  folic acid 1 mg oral tablet: 1 tab(s) orally once a day  Januvia 25 mg oral tablet: 1 tab(s) orally once a day   simvastatin 20 mg oral tablet: 1 tab(s) orally once a day (at bedtime)   amLODIPine 10 mg oral tablet: 1 tab(s) orally once a day  aspirin 81 mg oral tablet, chewable: 1 tab(s) orally once a day  calcium acetate 667 mg oral tablet: 1 tab(s) orally 3 times a day  carvedilol 6.25 mg oral tablet: 1 tab(s) orally every 12 hours  folic acid 1 mg oral tablet: 1 tab(s) orally once a day  Januvia 25 mg oral tablet: 1 tab(s) orally once a day   simvastatin 20 mg oral tablet: 1 tab(s) orally once a day (at bedtime)

## 2022-05-28 NOTE — H&P ADULT - NSHPSOCIALHISTORY_GEN_ALL_CORE
Lives with Daughter  Ambulates Independently   EtOH - Denies  Illicit Drugs - Denies  Tobacco - Denies

## 2022-05-28 NOTE — PROVIDER CONTACT NOTE (OTHER) - BACKGROUND
(Admit Diagnosis) Chest pain  (PMH) HTN (hypertension)  (PMH) ESRD (end stage renal disease)  (PMH) Diabetes

## 2022-05-28 NOTE — H&P ADULT - NEGATIVE PSYCHIATRIC SYMPTOMS
no suicidal ideation/no depression/no anxiety/no insomnia/no memory loss/no agitation/no visual hallucinations/no auditory hallucinations

## 2022-05-28 NOTE — DISCHARGE NOTE PROVIDER - HOSPITAL COURSE
88 y/o Male with a past medical history of DMII, HTN, HLD and ESRD (HD MWF) presenting with chest pain which occurred and resolved on Wednesday admitted to medicine for workup.      Chest pain.   - Pt noted to have chest pain during recent HD session, currently CP free  - seen by lefty, f/u TTE ????????????????????????????????????????????????????  - Cards recs appreciated  - C/w Coreg 6.25mg BID, started inpt   - c/w Amlodipine 10mg daily.    ESRD on dialysis.   - Dialysis MWF  - last HD prior to admission on Friday   - DASH/Renal/DM diet      Elevated TSH.    - TSH 4.86  - Follow up thyroid studies.     DM  type 2.   - Hold home DM meds while on the hospital   - ISS pre meal and bedtime  - DASH/Renal/DM diet.     HTN   - Continue home amlodipine and coreg started inpt.       86 y/o Male with a past medical history of DMII, HTN, HLD and ESRD (HD MWF) presenting with chest pain which occurred and resolved on Wednesday admitted to medicine for workup.      Chest pain.   - Pt noted to have chest pain during recent HD session, currently CP free  - Cards recs appreciated  - echo - Mild left ventricular enlargement. Estimated ejection  fraction 50%.  - C/w Coreg 6.25mg BID, started inpt   - c/w Amlodipine 10mg daily.    ESRD on dialysis.   - Dialysis MWF  - last HD prior to admission on Friday   reinstated prior to discharge   - DASH/Renal/DM diet      Elevated TSH.    - TSH 4.86  - Follow up thyroid studies outpatient      DM  type 2.   - Hold home DM meds while on the hospital   - ISS pre meal and bedtime  - DASH/Renal/DM diet.     HTN   - Continue home amlodipine and coreg started inpt.    Discussed with attending ready for discharge home        88 y/o Male with a past medical history of DMII, HTN, HLD and ESRD (HD MWF) presenting with chest pain which occurred and resolved on Wednesday admitted to medicine for workup.      Chest pain.   - Pt noted to have chest pain during recent HD session, currently CP free  - Cards recs appreciated  - echo - Mild left ventricular enlargement. Estimated ejection  fraction 50%.  - C/w Coreg 6.25mg BID, started inpt   - c/w Amlodipine 10mg daily.  - Given significant vascular risk factors, would recommend outpatient stress test for further evaluation    ESRD on dialysis.   - Dialysis MWF  - last HD prior to admission on Friday   reinstated prior to discharge   - DASH/Renal/DM diet      Elevated TSH.    - TSH 4.86  - Follow up thyroid studies outpatient      DM  type 2.   - Hold home DM meds while on the hospital   - ISS pre meal and bedtime  - DASH/Renal/DM diet.     HTN   - Continue home amlodipine and coreg started inpt.    Discussed with attending ready for discharge home

## 2022-05-29 ENCOUNTER — TRANSCRIPTION ENCOUNTER (OUTPATIENT)
Age: 87
End: 2022-05-29

## 2022-05-29 VITALS
SYSTOLIC BLOOD PRESSURE: 142 MMHG | OXYGEN SATURATION: 100 % | TEMPERATURE: 98 F | HEART RATE: 71 BPM | DIASTOLIC BLOOD PRESSURE: 68 MMHG | RESPIRATION RATE: 18 BRPM

## 2022-05-29 LAB
GLUCOSE BLDC GLUCOMTR-MCNC: 109 MG/DL — HIGH (ref 70–99)
GLUCOSE BLDC GLUCOMTR-MCNC: 79 MG/DL — SIGNIFICANT CHANGE UP (ref 70–99)

## 2022-05-29 PROCEDURE — 99239 HOSP IP/OBS DSCHRG MGMT >30: CPT

## 2022-05-29 PROCEDURE — 93306 TTE W/DOPPLER COMPLETE: CPT | Mod: 26

## 2022-05-29 RX ORDER — ASPIRIN/CALCIUM CARB/MAGNESIUM 324 MG
1 TABLET ORAL
Qty: 0 | Refills: 0 | DISCHARGE
Start: 2022-05-29

## 2022-05-29 RX ORDER — CARVEDILOL PHOSPHATE 80 MG/1
1 CAPSULE, EXTENDED RELEASE ORAL
Qty: 60 | Refills: 0
Start: 2022-05-29 | End: 2022-06-27

## 2022-05-29 RX ADMIN — CARVEDILOL PHOSPHATE 6.25 MILLIGRAM(S): 80 CAPSULE, EXTENDED RELEASE ORAL at 05:00

## 2022-05-29 RX ADMIN — Medication 1 MILLIGRAM(S): at 12:01

## 2022-05-29 RX ADMIN — AMLODIPINE BESYLATE 10 MILLIGRAM(S): 2.5 TABLET ORAL at 05:00

## 2022-05-29 RX ADMIN — Medication 81 MILLIGRAM(S): at 12:01

## 2022-05-29 NOTE — PROGRESS NOTE ADULT - PROBLEM SELECTOR PLAN 4
- Monitor FS   - Hold home DM meds  - ISS pre meal and bedtime  - A1C in AM   - DASH/Renal/DM diet
BP stable   - Continue home amlodipine and coreg started inpt

## 2022-05-29 NOTE — PROGRESS NOTE ADULT - SUBJECTIVE AND OBJECTIVE BOX
[facility] Division of Hospital Medicine  Noah LuDO  Pager (M-F, 2R-5P): [phone]      Patient is a 87y old  Male who presents with a chief complaint of Chest Pain (28 May 2022 14:02)      SUBJECTIVE / OVERNIGHT EVENTS: no overnight events   ADDITIONAL REVIEW OF SYSTEMS:    MEDICATIONS  (STANDING):  amLODIPine   Tablet 10 milliGRAM(s) Oral daily  aspirin  chewable 81 milliGRAM(s) Oral daily  atorvastatin 40 milliGRAM(s) Oral at bedtime  carvedilol 6.25 milliGRAM(s) Oral every 12 hours  dextrose 5%. 1000 milliLiter(s) (100 mL/Hr) IV Continuous <Continuous>  dextrose 5%. 1000 milliLiter(s) (50 mL/Hr) IV Continuous <Continuous>  dextrose 50% Injectable 25 Gram(s) IV Push once  dextrose 50% Injectable 12.5 Gram(s) IV Push once  dextrose 50% Injectable 25 Gram(s) IV Push once  folic acid 1 milliGRAM(s) Oral daily  glucagon  Injectable 1 milliGRAM(s) IntraMuscular once  insulin lispro (ADMELOG) corrective regimen sliding scale   SubCutaneous three times a day before meals  insulin lispro (ADMELOG) corrective regimen sliding scale   SubCutaneous at bedtime    MEDICATIONS  (PRN):  acetaminophen     Tablet .. 650 milliGRAM(s) Oral every 6 hours PRN Temp greater or equal to 38C (100.4F), Mild Pain (1 - 3)  dextrose Oral Gel 15 Gram(s) Oral once PRN Blood Glucose LESS THAN 70 milliGRAM(s)/deciliter  melatonin 3 milliGRAM(s) Oral at bedtime PRN Insomnia      CAPILLARY BLOOD GLUCOSE      POCT Blood Glucose.: 113 mg/dL (28 May 2022 21:10)  POCT Blood Glucose.: 98 mg/dL (28 May 2022 17:31)  POCT Blood Glucose.: 117 mg/dL (28 May 2022 12:32)    I&O's Summary    28 May 2022 07:01  -  29 May 2022 07:00  --------------------------------------------------------  IN: 480 mL / OUT: 0 mL / NET: 480 mL        PHYSICAL EXAM:  Vital Signs Last 24 Hrs  T(C): 36.8 (29 May 2022 04:40), Max: 37.1 (28 May 2022 22:10)  T(F): 98.2 (29 May 2022 04:40), Max: 98.7 (28 May 2022 22:10)  HR: 73 (29 May 2022 04:40) (70 - 75)  BP: 150/74 (29 May 2022 04:40) (135/60 - 163/80)  BP(mean): --  RR: 17 (29 May 2022 04:40) (17 - 18)  SpO2: 100% (29 May 2022 04:40) (99% - 100%)  CONSTITUTIONAL: NAD, well-developed, well-groomed  EYES: EOMI; conjunctiva and sclera clear  ENMT: Moist oral mucosa, no pharyngeal injection or exudates; normal dentition  NECK: Supple, no palpable masses; no thyromegaly  RESPIRATORY: Normal respiratory effort; lungs are clear to auscultation bilaterally  CARDIOVASCULAR: Regular rate and rhythm, normal S1 and S2, no murmur/rub/gallop; No lower extremity edema; Peripheral pulses are 2+ bilaterally  ABDOMEN: Nontender to palpation, normoactive bowel sounds, no rebound/guarding; No hepatosplenomegaly  MUSKULOSKELETAL:  no clubbing or cyanosis of digits; no joint swelling or tenderness to palpation  PSYCH: A+O to person, place, and time; affect appropriate  NEUROLOGY: CN 2-12 are intact and symmetric; no gross sensory deficits;   SKIN: No rashes; no palpable lesions    LABS:                        11.7   4.21  )-----------( 108      ( 28 May 2022 06:41 )             36.1     05-28    138  |  99  |  27<H>  ----------------------------<  78  4.5   |  26  |  8.49<H>    Ca    10.3      28 May 2022 06:41  Phos  3.8     05-28  Mg     2.20     05-28    TPro  7.1  /  Alb  4.1  /  TBili  0.4  /  DBili  x   /  AST  17  /  ALT  9   /  AlkPhos  46  05-28    PT/INR - ( 27 May 2022 19:11 )   PT: 10.6 sec;   INR: 0.91 ratio         PTT - ( 27 May 2022 19:11 )  PTT:33.7 sec  CARDIAC MARKERS ( 27 May 2022 19:11 )  x     / x     / 143 U/L / x     / 1.8 ng/mL            RADIOLOGY & ADDITIONAL TESTS:  Results Reviewed:   Imaging Personally Reviewed:  Electrocardiogram Personally Reviewed:    COORDINATION OF CARE:  Care Discussed with Consultants/Other Providers [Y/N]: Y  Prior or Outpatient Records Reviewed [Y/N]: Y   [facility] Division of Hospital Medicine  Noah LuDO  Pager (M-F, 3N-5P): [phone]      Patient is a 87y old  Male who presents with a chief complaint of Chest Pain (28 May 2022 14:02)      SUBJECTIVE / OVERNIGHT EVENTS: Tele w/sinus rhythm, no overnight events, pt feeling well. Wants to go home today. TTE completed this morning  ADDITIONAL REVIEW OF SYSTEMS: no cp/sob     MEDICATIONS  (STANDING):  amLODIPine   Tablet 10 milliGRAM(s) Oral daily  aspirin  chewable 81 milliGRAM(s) Oral daily  atorvastatin 40 milliGRAM(s) Oral at bedtime  carvedilol 6.25 milliGRAM(s) Oral every 12 hours  dextrose 5%. 1000 milliLiter(s) (100 mL/Hr) IV Continuous <Continuous>  dextrose 5%. 1000 milliLiter(s) (50 mL/Hr) IV Continuous <Continuous>  dextrose 50% Injectable 25 Gram(s) IV Push once  dextrose 50% Injectable 12.5 Gram(s) IV Push once  dextrose 50% Injectable 25 Gram(s) IV Push once  folic acid 1 milliGRAM(s) Oral daily  glucagon  Injectable 1 milliGRAM(s) IntraMuscular once  insulin lispro (ADMELOG) corrective regimen sliding scale   SubCutaneous three times a day before meals  insulin lispro (ADMELOG) corrective regimen sliding scale   SubCutaneous at bedtime    MEDICATIONS  (PRN):  acetaminophen     Tablet .. 650 milliGRAM(s) Oral every 6 hours PRN Temp greater or equal to 38C (100.4F), Mild Pain (1 - 3)  dextrose Oral Gel 15 Gram(s) Oral once PRN Blood Glucose LESS THAN 70 milliGRAM(s)/deciliter  melatonin 3 milliGRAM(s) Oral at bedtime PRN Insomnia      CAPILLARY BLOOD GLUCOSE      POCT Blood Glucose.: 113 mg/dL (28 May 2022 21:10)  POCT Blood Glucose.: 98 mg/dL (28 May 2022 17:31)  POCT Blood Glucose.: 117 mg/dL (28 May 2022 12:32)    I&O's Summary    28 May 2022 07:01  -  29 May 2022 07:00  --------------------------------------------------------  IN: 480 mL / OUT: 0 mL / NET: 480 mL        PHYSICAL EXAM:  Vital Signs Last 24 Hrs  T(C): 36.8 (29 May 2022 04:40), Max: 37.1 (28 May 2022 22:10)  T(F): 98.2 (29 May 2022 04:40), Max: 98.7 (28 May 2022 22:10)  HR: 73 (29 May 2022 04:40) (70 - 75)  BP: 150/74 (29 May 2022 04:40) (135/60 - 163/80)  BP(mean): --  RR: 17 (29 May 2022 04:40) (17 - 18)  SpO2: 100% (29 May 2022 04:40) (99% - 100%)  CONSTITUTIONAL: NAD, well-appearing   HEENT: EOMI, MMM, no jvd  RESPIRATORY: Normal respiratory effort; lungs are clear to auscultation bilaterally  CARDIOVASCULAR: Regular rate and rhythm, normal S1 and S2, no murmurs  ABDOMEN: Nontender to palpation, normoactive bowel sounds, soft   MUSKULOSKELETAL:  +LUE AVF   PSYCH: A+O to person, place, and time; affect appropriate  NEUROLOGY: CN 2-12 are intact and symmetric; no gross sensory deficits  SKIN: No rashes; no palpable lesions    LABS:                        11.7   4.21  )-----------( 108      ( 28 May 2022 06:41 )             36.1     05-28    138  |  99  |  27<H>  ----------------------------<  78  4.5   |  26  |  8.49<H>    Ca    10.3      28 May 2022 06:41  Phos  3.8     05-28  Mg     2.20     05-28    TPro  7.1  /  Alb  4.1  /  TBili  0.4  /  DBili  x   /  AST  17  /  ALT  9   /  AlkPhos  46  05-28    PT/INR - ( 27 May 2022 19:11 )   PT: 10.6 sec;   INR: 0.91 ratio         PTT - ( 27 May 2022 19:11 )  PTT:33.7 sec  CARDIAC MARKERS ( 27 May 2022 19:11 )  x     / x     / 143 U/L / x     / 1.8 ng/mL            RADIOLOGY & ADDITIONAL TESTS:  < from: Transthoracic Echocardiogram (05.29.22 @ 07:48) >  OBSERVATIONS:  Mitral Valve: Normal mitral valve. Minimal mitral  regurgitation.  Aortic Root: Normal aortic root.  Aortic Valve: Calcified aortic valve with normal opening.  Left Atrium: Mildly dilated left atrium.  Left Ventricle: Mild left ventricular enlargement.  Estimated ejection fraction 50%.  Left ventricular filling pressure is elevated  ("pseudonormal" filling pattern).  Right Heart: Normal right atrium. Normal right ventricular  size and function.  Normal tricuspid valve. Mild tricuspid regurgitation.  Normal pulmonic valve. Mild pulmonic regurgitation.  Pericardium/PleuraSmall pericardial effusion anterior to  the right ventricle.  Hemodynamic: Estimated right atrial pressure is normal.  No evidence of pulmonary hypertension.  No PFO seen with color Doppler.  ------------------------------------------------------------------------  CONCLUSIONS:  Mild left ventricular enlargement. Estimated ejection  fraction 50%.  ------------------------------------------------------------------------  Confirmed on  5/29/2022 - 11:30:21 by All Simpson M.D.  ------------------------------------------------------------------------    < end of copied text >      COORDINATION OF CARE:  Care Discussed with Consultants/Other Providers [Y/N]: Y  Prior or Outpatient Records Reviewed [Y/N]: LISA

## 2022-05-29 NOTE — DISCHARGE NOTE NURSING/CASE MANAGEMENT/SOCIAL WORK - NSDCVIVACCINE_GEN_ALL_CORE_FT
Tdap; 30-May-2015 23:48; Jennifer Beard (CAYETANO); [employer]; [id number]; IntraMuscular; Deltoid Right.; 0.5 milliLiter(s); VIS (VIS Published: 09-May-2013, VIS Presented: 30-May-2015);

## 2022-05-29 NOTE — PROGRESS NOTE ADULT - PROBLEM SELECTOR PLAN 2
- Dialysis MWF  - last HD prior to admission on Friday   - DASH/Renal/DM diet  - Monitor electrolytes
- Dialysis MWF  - last HD prior to admission on Friday   - DASH/Renal/DM diet  - Monitor electrolytes

## 2022-05-29 NOTE — PROGRESS NOTE ADULT - PROBLEM SELECTOR PLAN 3
- TSH 4.86  - Follow up thyroid studies
- Monitor FS   - Hold home DM meds  - ISS pre meal and bedtime

## 2022-05-29 NOTE — PROGRESS NOTE ADULT - PROBLEM SELECTOR PLAN 1
- Pt noted to have chest pain during recent HD session, currently CP free  - seen by cards, f/u TTE   - Monitor on telemetry   - Cards recs appreciated  - C/w Coreg 6.25mg BID, started inpt   - c/w Amlodipine 10mg daily
- Pt noted to have chest pain during recent HD session, currently CP free  - TTE reviewed  - No events on tele  - Cards recs appreciated, outpt NST   - C/w Coreg 6.25mg BID, started inpt   - c/w Amlodipine 10mg daily  - DC home today

## 2022-06-17 LAB — HGB BLD-MCNC: 8.8 G/DL — LOW (ref 13–17)

## 2022-06-27 NOTE — ED ADULT TRIAGE NOTE - NS_BHTRGNUMBER_ED_A_ED_FT
[phone] Continue to stay hydrated.  For fever of 100.4F or higher you can take acetaminophen according to bottle instructions.  Prescription sent for amoxicillin, please take as prescribed.   See pediatrician within 1 week.    Pneumonia    Pneumonia is an infection of the lungs. Pneumonia may be caused by bacteria, viruses, or funguses. Symptoms include coughing, fever, chest pain when breathing deeply or coughing, shortness of breath, fatigue, or muscle aches. Pneumonia can be diagnosed with a medical history and physical exam, as well as other tests which may include a chest X-ray. If you were prescribed an antibiotic medicine, take it as told by your health care provider and do not stop taking the antibiotic even if you start to feel better. Do not use tobacco products, including cigarettes, chewing tobacco, and e-cigarettes.    SEEK IMMEDIATE MEDICAL CARE IF YOU HAVE ANY OF THE FOLLOWING SYMPTOMS: trouble breathing, chest pain, coughing up blood, change in mental status, unable to eat or drink, decreased urination, signs of dehydration.

## 2022-07-07 ENCOUNTER — APPOINTMENT (OUTPATIENT)
Dept: VASCULAR SURGERY | Facility: CLINIC | Age: 87
End: 2022-07-07

## 2022-07-07 PROCEDURE — 93990 DOPPLER FLOW TESTING: CPT

## 2022-07-08 LAB
HEMOLYSIS INDEX: 9 — SIGNIFICANT CHANGE UP
POTASSIUM SERPL-MCNC: 5.4 MMOL/L — HIGH (ref 3.5–5.3)
POTASSIUM SERPL-SCNC: 5.4 MMOL/L — HIGH (ref 3.5–5.3)

## 2022-07-19 ENCOUNTER — RESULT REVIEW (OUTPATIENT)
Age: 87
End: 2022-07-19

## 2022-07-19 ENCOUNTER — APPOINTMENT (OUTPATIENT)
Dept: ENDOVASCULAR SURGERY | Facility: CLINIC | Age: 87
End: 2022-07-19

## 2022-07-19 VITALS
DIASTOLIC BLOOD PRESSURE: 72 MMHG | SYSTOLIC BLOOD PRESSURE: 176 MMHG | HEART RATE: 67 BPM | BODY MASS INDEX: 24.64 KG/M2 | RESPIRATION RATE: 20 BRPM | WEIGHT: 157 LBS | OXYGEN SATURATION: 98 % | TEMPERATURE: 97.8 F | HEIGHT: 67 IN

## 2022-07-19 PROCEDURE — 36902Z: CUSTOM

## 2022-07-21 NOTE — REASON FOR VISIT
[Other ___] : a [unfilled] visit for [Family Member] : family member [Aneurysm] : aneurysm [FreeTextEntry2] : possible recirculation

## 2022-07-21 NOTE — HISTORY OF PRESENT ILLNESS
[] : left radiocephalic fistula [FreeTextEntry1] : 5/7/2020 Dr Adams [FreeTextEntry4] : yesterday [FreeTextEntry5] : yesterday at 7pm [FreeTextEntry6] : Dr Munoz

## 2022-07-21 NOTE — PAST MEDICAL HISTORY
[Increasing age ( >40 years old)] : Increasing age ( >40 years old) [No therapy indicated for cases scheduled for less than one hour] : No therapy indicated for cases scheduled for less than one hour. [FreeTextEntry1] : Malignant Hyperthermia Screening Tool and Risk of Bleeding Assessment \par Mr. VINCENT VALE denies family of unexpected death following Anesthesia or Exercise.\par Denies Family history of Malignant Hyperthermia, Muscle or Neuromuscular disorder and High Temperature  following exercise.\par \par Mr. VINCENT VALE denies history of Muscle Spasm, Dark or Chocolate- Colored and Unanticipated fever immediately following anaesthesia or serious exercise.\par Mr. VINCENT VALE also denies bleeding tendencies/Risks of Bleeding.\par

## 2022-07-21 NOTE — ASSESSMENT
[Other: _____] : [unfilled] [FreeTextEntry1] : referred from dialysis for possible recirculation, plan for fistulogram, possible intervention

## 2022-08-18 ENCOUNTER — APPOINTMENT (OUTPATIENT)
Dept: VASCULAR SURGERY | Facility: CLINIC | Age: 87
End: 2022-08-18

## 2022-10-18 ENCOUNTER — APPOINTMENT (OUTPATIENT)
Dept: VASCULAR SURGERY | Facility: CLINIC | Age: 87
End: 2022-10-18

## 2022-10-18 VITALS
TEMPERATURE: 97.9 F | HEART RATE: 75 BPM | SYSTOLIC BLOOD PRESSURE: 130 MMHG | DIASTOLIC BLOOD PRESSURE: 60 MMHG | HEIGHT: 67 IN

## 2022-10-18 PROCEDURE — 99213 OFFICE O/P EST LOW 20 MIN: CPT

## 2022-10-18 PROCEDURE — 93990 DOPPLER FLOW TESTING: CPT

## 2022-10-18 NOTE — HISTORY OF PRESENT ILLNESS
[FreeTextEntry1] : 88 yo male with history of esrd on hd presents for follow up of left upper extremity radial cephalic avf.  pt without any complaints \par pt denies any difficulty with avf during hd\par Patient with small scab over the distal aspect of the AV fistula after recent access over thinned out skin patient denies any bleeding fevers chills or discharge from the area patient states that he has been more alert and has been guiding them to access healthy skin and avoid the light thinned out areas

## 2022-10-18 NOTE — PHYSICAL EXAM
[Thrill] : thrill [Pulsatile Thrill] : no pulsatile thrill [Aneurysm] : no aneurysm [Bleeding] : no bleeding [Hand well perfused] : hand well perfused [Ulcer] : no ulcer [2+] : left 2+ [Normal] : coordination grossly intact, no focal deficits [de-identified] :  strength 5 out of 5 [de-identified] : Small scab superficial over the distal aspect of the AV fistula secondary to recent access over thinned out skin

## 2022-11-09 NOTE — PATIENT PROFILE ADULT - FUNCTIONAL ASSESSMENT - BASIC MOBILITY 4.
Initial Clinical Review    Admission: Date/Time/Statement:   Admission Orders (From admission, onward)     Ordered        11/05/22 0837  INPATIENT ADMISSION  Once                      Orders Placed This Encounter   Procedures   • INPATIENT ADMISSION     Standing Status:   Standing     Number of Occurrences:   1     Order Specific Question:   Level of Care     Answer:   Critical Care [15]     Order Specific Question:   Estimated length of stay     Answer:   More than 2 Midnights     Order Specific Question:   Certification     Answer:   I certify that inpatient services are medically necessary for this patient for a duration of greater than two midnights  See H&P and MD Progress Notes for additional information about the patient's course of treatment  ED Arrival Information     Expected   -    Arrival   11/5/2022 04:29    Acuity   Emergent            Means of arrival   Walk-In    Escorted by   Self    Service   Critical Care/ICU    Admission type   Emergency            Arrival complaint   chest pain            Chief Complaint   Patient presents with   • Chest Pain     Cold symptoms/cough for approx 1 week  Chest pressure for the past few days but 2 hours ago developed chest pain and numbness in the left side of her face and SOB  Concerned because symptoms are similar to her previous MI  Initial Presentation: 38 y o  female with past medical history of CAD status post drug-eluting stent to [facility] in 2019 and stent in 2021 at [facility], hypertension, diabetes, ADD, depression, history of uterine cancer status post hysterectomy who presents with chest pain worsening  Patient states last week she started with an upper respiratory/sinus infection with nonproductive cough/headache/fevers  Proximally 3 days ago she started with chest pain initially worse with inspiration now constant not associated with inspiration radiating to her neck and jaw all prompting her come to the ER   On arrival to the ER patient was hypertensive in the 250s over 120s  She was given IV labetalol on p o  clonidine without great improvement  She was started on a Cardene infusion  Troponins resulted thirty-nine thousand with T-wave inversions inferior laterally  Plan: Inpatient admission for evaluation and treatment of hypertensive urgency, elevated troponin, chest pain: cardene drip, continue home labetalol and HCTZ, Echo, trend troponin, start heparin drip, ASA  Cardiology is recommending transfer to a facility with cardiac catheterization capabilities   Spoke with patient who is requesting [facility] as that is where her prior catheterization was done  Pt transferred out       ED Triage Vitals   Temperature Pulse Respirations Blood Pressure SpO2   11/05/22 0437 11/05/22 0437 11/05/22 0437 11/05/22 0447 11/05/22 0437   97 7 °F (36 5 °C) (!) 113 19 (!) 254/128 98 %      Temp Source Heart Rate Source Patient Position - Orthostatic VS BP Location FiO2 (%)   11/05/22 0437 11/05/22 0845 11/05/22 1230 11/05/22 1230 --   Temporal Monitor Lying Right arm       Pain Score       11/05/22 0437       7          Wt Readings from Last 1 Encounters:   11/05/22 102 kg (225 lb)     Additional Vital Signs:     Date/Time Temp Pulse Resp BP MAP (mmHg) SpO2 O2 Device   11/05/22 1415 -- 96 19 155/93 116 96 % --   11/05/22 1400 -- 92 21 131/77 96 93 % None (Room air)   11/05/22 1330 -- 91 18 128/78 99 94 % None (Room air)   11/05/22 1300 -- 90 19 117/71 88 94 % None (Room air)   11/05/22 1230 -- 86 16 128/77 97 95 % None (Room air)   11/05/22 1100 -- 85 26 Abnormal  127/62 89 94 % --   11/05/22 1045 -- 87 21 117/57 79 95 % --   11/05/22 1030 -- 85 18 116/58 81 94 % --   11/05/22 1015 -- 93 10 Abnormal  131/73 95 98 % --   11/05/22 1000 -- 98 16 175/98 Abnormal  -- 97 % --   11/05/22 0942 -- 108 Abnormal  -- 166/77 -- -- --   11/05/22 0930 -- 106 Abnormal  13 166/77 110 95 % --   11/05/22 0900 -- 104 19 160/71 102 95 % --   11/05/22 0845 -- 104 20 165/77 -- 96 % --   11/05/22 0830 -- 104 14 181/93 Abnormal  -- 95 % None (Room air)   11/05/22 0815 -- 100 12 182/95 Abnormal  127 96 % None (Room air)   11/05/22 0800 -- 99 11 Abnormal  184/99 Abnormal  132 97 % --   11/05/22 0745 -- 96 23 Abnormal  198/107 Abnormal  145 98 % --   11/05/22 0730 -- 92 16 200/123 Abnormal  154 98 % --   11/05/22 0700 -- 95 25 Abnormal  184/100 Abnormal  135 94 % --   11/05/22 0645 -- 97 11 Abnormal  197/114 Abnormal  -- 97 % --   11/05/22 0500 -- 106 Abnormal  15 246/130 Abnormal  181 97 % --   11/05/22 0447 -- -- -- 254/128 Abnormal  -- -- --     Pertinent Labs/Diagnostic Test Results:   XR chest 1 view portable   Final Result by Napoleon Cooper MD (11/05 1611)      No acute cardiopulmonary disease  Workstation performed: [id number]           11/5 Repeat EKG:  Normal sinus rhythm  Possible Left atrial enlargement  ST & T wave abnormality, consider inferolateral ischemia   Consider ACUTE CORONARY SYNDROME (ACS)   Prolonged QT  Abnormal ECG  When compared with ECG of 05-NOV-2022 08:44, (unconfirmed)  T wave inversion less evident in Lateral leads  QT has lengthened    11/5 EKG:  Sinus tachycardia  Possible Left atrial enlargement  ST & T wave abnormality, consider inferolateral ischemia  Abnormal ECG  When compared with ECG of 24-OCT-2019 10:46,  Vent   rate has increased BY  39 BPM  ST now depressed in Lateral leads  Inverted T waves have replaced nonspecific T wave abnormality in Inferior leads  Inverted T waves have replaced nonspecific T wave abnormality in Anterolateral leads    Results from last 7 days   Lab Units 11/05/22 0442   SARS-COV-2  Negative     Results from last 7 days   Lab Units 11/05/22 0440   WBC Thousand/uL 18 94*   HEMOGLOBIN g/dL 15 7*   HEMATOCRIT % 47 9*   PLATELETS Thousands/uL 351   NEUTROS ABS Thousands/µL 10 55*         Results from last 7 days   Lab Units 11/05/22 0440   SODIUM mmol/L 134*   POTASSIUM mmol/L 3 5   CHLORIDE mmol/L 97   CO2 mmol/L 26   ANION GAP mmol/L 11   BUN mg/dL 13   CREATININE mg/dL 0 80   EGFR ml/min/1 73sq m 92   CALCIUM mg/dL 8 5   MAGNESIUM mg/dL 1 8     Results from last 7 days   Lab Units 11/05/22  0440   AST U/L 68*   ALT U/L 23   ALK PHOS U/L 110   TOTAL PROTEIN g/dL 7 9   ALBUMIN g/dL 3 4*   TOTAL BILIRUBIN mg/dL 0 69     Results from last 7 days   Lab Units 11/05/22  1138   POC GLUCOSE mg/dl 296*     Results from last 7 days   Lab Units 11/05/22  0440   GLUCOSE RANDOM mg/dL 317*         Results from last 7 days   Lab Units 11/05/22  1307 11/05/22  1044 11/05/22  0843 11/05/22  0659 11/05/22  0440   HS TNI 0HR ng/L  --  4,378*  --   --  3,989*   HS TNI 2HR ng/L 5,255*  --   --  3,275*  --    HSTNI D2 ng/L 877*  --   --  -714  --    HS TNI 4HR ng/L  --   --  4,577*  --   --    HSTNI D4 ng/L  --   --  588*  --   --          Results from last 7 days   Lab Units 11/05/22  1044   PROTIME seconds 13 3   INR  1 00   PTT seconds 29     Results from last 7 days   Lab Units 11/05/22  0440   TSH 3RD GENERATON uIU/mL 1 583     Results from last 7 days   Lab Units 11/05/22  0440   PROCALCITONIN ng/ml <0 05                 Results from last 7 days   Lab Units 11/05/22  0440   NT-PRO BNP pg/mL 1,554*         Results from last 7 days   Lab Units 11/05/22  1427   CREATININE UR mg/dL 37 5   PROTEIN UR mg/dL 13   PROT/CREAT RATIO UR  0 35*     Results from last 7 days   Lab Units 11/05/22  0442   INFLUENZA A PCR  Negative   INFLUENZA B PCR  Negative   RSV PCR  Negative         ED Treatment:   Medication Administration from 11/05/2022 0428 to 11/09/2022 0809       Date/Time Order Dose Route Action     11/05/2022 0508 labetalol (NORMODYNE) injection 10 mg 10 mg Intravenous Given     11/05/2022 0508 cloNIDine (CATAPRES) tablet 0 1 mg 0 1 mg Oral Given     11/05/2022 0653 nitroglycerin (NITROSTAT) SL tablet 0 4 mg 0 4 mg Sublingual Given     11/05/2022 0651 aspirin chewable tablet 324 mg 324 mg Oral Given     11/05/2022 0723 niCARdipine (CARDENE) 25 mg (STANDARD CONCENTRATION) in sodium chloride 0 9% 250 mL 5 mg/hr Intravenous New Bag     11/05/2022 0714 acetaminophen (TYLENOL) tablet 650 mg 650 mg Oral Given     11/05/2022 0943 aspirin chewable tablet 81 mg 81 mg Oral Given     11/05/2022 0943 escitalopram (LEXAPRO) tablet 20 mg 20 mg Oral Given     11/05/2022 0943 hydrochlorothiazide (HYDRODIURIL) tablet 25 mg 25 mg Oral Given     11/05/2022 0942 labetalol (NORMODYNE) tablet 300 mg 300 mg Oral Given     11/05/2022 0942 enoxaparin (LOVENOX) subcutaneous injection 40 mg 40 mg Subcutaneous Given     11/05/2022 1000 niCARdipine (CARDENE) 25 mg (STANDARD CONCENTRATION) in sodium chloride 0 9% 250 mL 15 mg/hr Intravenous New Bag     11/05/2022 1143 insulin lispro (HumaLOG) 100 units/mL subcutaneous injection 1-6 Units 4 Units Subcutaneous Given     11/05/2022 1046 heparin (porcine) injection 4,000 Units 4,000 Units Intravenous Given     11/05/2022 1046 heparin (porcine) 25,000 units in 0 45% NaCl 250 mL infusion (premix) 11 1 Units/kg/hr Intravenous New Bag     11/05/2022 1135 ondansetron (ZOFRAN) injection 4 mg 4 mg Intravenous Given        Past Medical History:   Diagnosis Date   • Anxiety    • Cancer ([facility] )     uterine    • Coronary artery disease    • Diabetes mellitus (HCC)     prediabetes   • Herniated lumbar intervertebral disc     S1, L4   • Hypertension    • [facility] )    • Prediabetes    • Psychiatric disorder    • PTSD (post-traumatic stress disorder)      Present on Admission:  • Morbid obesity with body mass index (BMI) of 40 0 or higher (HCC)  • Attention deficit disorder (ADD) without hyperactivity  • Elevated troponin  • Hypertensive urgency  • Headache  • Chest pain  • Leukocytosis  • CAD (coronary artery disease)      Admitting Diagnosis: Chest pain [R07 9]  Age/Sex: 38 y o  female  Admission Orders:  Scheduled Medications:  No current facility-administered medications for this encounter      Continuous IV Infusions:    heparin (porcine) 25,000 units in 0 45% NaCl 250 mL infusion (premix)  Rate: 2 7-18 mL/hr Dose: 3-20 Units/kg/hr  Weight Dosing Info: 90 kg (Order-Specific)  Freq: Titrated Route: IV  Last Dose: 11 1 Units/kg/hr (11/05/22 1046)  Start: 11/05/22 1030 End: 11/05/22 1651    niCARdipine (CARDENE) 25 mg (STANDARD CONCENTRATION) in sodium chloride 0 9% 250 mL  Rate:  mL/hr Dose: 1-15 mg/hr  Freq: Titrated Route: IV  Last Dose: Stopped (11/05/22 1032)  Start: 11/05/22 0929 End: 11/05/22 1225    niCARdipine (CARDENE) 25 mg (STANDARD CONCENTRATION) in sodium chloride 0 9% 250 mL  Rate:  mL/hr Dose: 1-15 mg/hr  Freq: Titrated Route: IV  Last Dose: Stopped (11/05/22 0940)  Start: 11/05/22 0700 End: 11/05/22 0930    PRN Meds:  No current facility-administered medications for this encounter  IP CONSULT TO CASE MANAGEMENT    Network Utilization Review Department  ATTENTION: Please call with any questions or concerns to [phone] and carefully listen to the prompts so that you are directed to the right person  All voicemails are confidential   Lb [place] all requests for admission clinical reviews, approved or denied determinations and any other requests to dedicated fax number below belonging to the campus where the patient is receiving treatment   List of dedicated fax numbers for the Facilities:  [address] DENIALS (Administrative/Medical Necessity) [phone]   [address] [place] [address] (Maternity/NICU/Pediatrics) [phone]   [address] [facility] [phone]   [place] [address] [phone]   [address] [place] [address] [facility] [place] [address] [place] [address] [place] [address] [facility] [address] [place]   [address] [facility] [address] [place] [address] [facility] [phone] = No assist / stand by assistance

## 2022-12-19 RX ORDER — ATENOLOL 50 MG/1
50 TABLET ORAL DAILY
Qty: 90 | Refills: 1 | Status: ACTIVE | COMMUNITY
Start: 2021-04-22 | End: 1900-01-01

## 2023-01-24 ENCOUNTER — APPOINTMENT (OUTPATIENT)
Dept: VASCULAR SURGERY | Facility: CLINIC | Age: 88
End: 2023-01-24
Payer: MEDICARE

## 2023-01-24 PROCEDURE — 93990 DOPPLER FLOW TESTING: CPT

## 2023-01-25 ENCOUNTER — INPATIENT (INPATIENT)
Facility: HOSPITAL | Age: 88
LOS: 2 days | Discharge: HOME | End: 2023-01-28
Attending: INTERNAL MEDICINE | Admitting: INTERNAL MEDICINE
Payer: MEDICARE

## 2023-01-25 VITALS
OXYGEN SATURATION: 100 % | SYSTOLIC BLOOD PRESSURE: 141 MMHG | DIASTOLIC BLOOD PRESSURE: 73 MMHG | TEMPERATURE: 100 F | RESPIRATION RATE: 16 BRPM | HEART RATE: 84 BPM

## 2023-01-25 DIAGNOSIS — E87.5 HYPERKALEMIA: ICD-10-CM

## 2023-01-25 DIAGNOSIS — N18.9 CHRONIC KIDNEY DISEASE, UNSPECIFIED: ICD-10-CM

## 2023-01-25 DIAGNOSIS — N18.6 END STAGE RENAL DISEASE: ICD-10-CM

## 2023-01-25 DIAGNOSIS — A41.9 SEPSIS, UNSPECIFIED ORGANISM: ICD-10-CM

## 2023-01-25 DIAGNOSIS — I77.0 ARTERIOVENOUS FISTULA, ACQUIRED: Chronic | ICD-10-CM

## 2023-01-25 DIAGNOSIS — R63.8 OTHER SYMPTOMS AND SIGNS CONCERNING FOOD AND FLUID INTAKE: ICD-10-CM

## 2023-01-25 DIAGNOSIS — E11.9 TYPE 2 DIABETES MELLITUS WITHOUT COMPLICATIONS: ICD-10-CM

## 2023-01-25 DIAGNOSIS — G92.8 OTHER TOXIC ENCEPHALOPATHY: ICD-10-CM

## 2023-01-25 DIAGNOSIS — E78.5 HYPERLIPIDEMIA, UNSPECIFIED: ICD-10-CM

## 2023-01-25 DIAGNOSIS — E83.39 OTHER DISORDERS OF PHOSPHORUS METABOLISM: ICD-10-CM

## 2023-01-25 DIAGNOSIS — U07.1 COVID-19: ICD-10-CM

## 2023-01-25 DIAGNOSIS — I10 ESSENTIAL (PRIMARY) HYPERTENSION: ICD-10-CM

## 2023-01-25 LAB
ALBUMIN SERPL ELPH-MCNC: 5 G/DL — SIGNIFICANT CHANGE UP (ref 3.3–5)
ALP SERPL-CCNC: 78 U/L — SIGNIFICANT CHANGE UP (ref 40–120)
ALT FLD-CCNC: 7 U/L — SIGNIFICANT CHANGE UP (ref 4–41)
ANION GAP SERPL CALC-SCNC: 16 MMOL/L — HIGH (ref 7–14)
ANION GAP SERPL CALC-SCNC: 16 MMOL/L — HIGH (ref 7–14)
APTT BLD: 33 SEC — SIGNIFICANT CHANGE UP (ref 27–36.3)
AST SERPL-CCNC: 16 U/L — SIGNIFICANT CHANGE UP (ref 4–40)
B PERT DNA SPEC QL NAA+PROBE: SIGNIFICANT CHANGE UP
B PERT+PARAPERT DNA PNL SPEC NAA+PROBE: SIGNIFICANT CHANGE UP
BASOPHILS # BLD AUTO: 0.02 K/UL — SIGNIFICANT CHANGE UP (ref 0–0.2)
BASOPHILS NFR BLD AUTO: 0.3 % — SIGNIFICANT CHANGE UP (ref 0–2)
BILIRUB SERPL-MCNC: 0.5 MG/DL — SIGNIFICANT CHANGE UP (ref 0.2–1.2)
BLOOD GAS VENOUS COMPREHENSIVE RESULT: SIGNIFICANT CHANGE UP
BORDETELLA PARAPERTUSSIS (RAPRVP): SIGNIFICANT CHANGE UP
BUN SERPL-MCNC: 51 MG/DL — HIGH (ref 7–23)
BUN SERPL-MCNC: 56 MG/DL — HIGH (ref 7–23)
C PNEUM DNA SPEC QL NAA+PROBE: SIGNIFICANT CHANGE UP
CALCIUM SERPL-MCNC: 10.3 MG/DL — SIGNIFICANT CHANGE UP (ref 8.4–10.5)
CALCIUM SERPL-MCNC: 9.7 MG/DL — SIGNIFICANT CHANGE UP (ref 8.4–10.5)
CHLORIDE SERPL-SCNC: 100 MMOL/L — SIGNIFICANT CHANGE UP (ref 98–107)
CHLORIDE SERPL-SCNC: 97 MMOL/L — LOW (ref 98–107)
CO2 SERPL-SCNC: 25 MMOL/L — SIGNIFICANT CHANGE UP (ref 22–31)
CO2 SERPL-SCNC: 30 MMOL/L — SIGNIFICANT CHANGE UP (ref 22–31)
CREAT SERPL-MCNC: 14.31 MG/DL — HIGH (ref 0.5–1.3)
CREAT SERPL-MCNC: 14.63 MG/DL — HIGH (ref 0.5–1.3)
DIALYSIS INSTRUMENT RESULT - HEPATITIS B SURFACE ANTIGEN: NEGATIVE — SIGNIFICANT CHANGE UP
EGFR: 3 ML/MIN/1.73M2 — LOW
EGFR: 3 ML/MIN/1.73M2 — LOW
EOSINOPHIL # BLD AUTO: 0.02 K/UL — SIGNIFICANT CHANGE UP (ref 0–0.5)
EOSINOPHIL NFR BLD AUTO: 0.3 % — SIGNIFICANT CHANGE UP (ref 0–6)
FLUAV SUBTYP SPEC NAA+PROBE: SIGNIFICANT CHANGE UP
FLUBV RNA SPEC QL NAA+PROBE: SIGNIFICANT CHANGE UP
GLUCOSE SERPL-MCNC: 98 MG/DL — SIGNIFICANT CHANGE UP (ref 70–99)
GLUCOSE SERPL-MCNC: 99 MG/DL — SIGNIFICANT CHANGE UP (ref 70–99)
HADV DNA SPEC QL NAA+PROBE: SIGNIFICANT CHANGE UP
HCOV 229E RNA SPEC QL NAA+PROBE: SIGNIFICANT CHANGE UP
HCOV HKU1 RNA SPEC QL NAA+PROBE: SIGNIFICANT CHANGE UP
HCOV NL63 RNA SPEC QL NAA+PROBE: SIGNIFICANT CHANGE UP
HCOV OC43 RNA SPEC QL NAA+PROBE: SIGNIFICANT CHANGE UP
HCT VFR BLD CALC: 40.5 % — SIGNIFICANT CHANGE UP (ref 39–50)
HGB BLD-MCNC: 12.2 G/DL — LOW (ref 13–17)
HMPV RNA SPEC QL NAA+PROBE: SIGNIFICANT CHANGE UP
HPIV1 RNA SPEC QL NAA+PROBE: SIGNIFICANT CHANGE UP
HPIV2 RNA SPEC QL NAA+PROBE: SIGNIFICANT CHANGE UP
HPIV3 RNA SPEC QL NAA+PROBE: SIGNIFICANT CHANGE UP
HPIV4 RNA SPEC QL NAA+PROBE: SIGNIFICANT CHANGE UP
IANC: 5.64 K/UL — SIGNIFICANT CHANGE UP (ref 1.8–7.4)
IMM GRANULOCYTES NFR BLD AUTO: 0.5 % — SIGNIFICANT CHANGE UP (ref 0–0.9)
INR BLD: 1.12 RATIO — SIGNIFICANT CHANGE UP (ref 0.88–1.16)
LYMPHOCYTES # BLD AUTO: 0.86 K/UL — LOW (ref 1–3.3)
LYMPHOCYTES # BLD AUTO: 11.1 % — LOW (ref 13–44)
M PNEUMO DNA SPEC QL NAA+PROBE: SIGNIFICANT CHANGE UP
MCHC RBC-ENTMCNC: 27.7 PG — SIGNIFICANT CHANGE UP (ref 27–34)
MCHC RBC-ENTMCNC: 30.1 GM/DL — LOW (ref 32–36)
MCV RBC AUTO: 91.8 FL — SIGNIFICANT CHANGE UP (ref 80–100)
MONOCYTES # BLD AUTO: 1.14 K/UL — HIGH (ref 0–0.9)
MONOCYTES NFR BLD AUTO: 14.8 % — HIGH (ref 2–14)
NEUTROPHILS # BLD AUTO: 5.64 K/UL — SIGNIFICANT CHANGE UP (ref 1.8–7.4)
NEUTROPHILS NFR BLD AUTO: 73 % — SIGNIFICANT CHANGE UP (ref 43–77)
NRBC # BLD: 0 /100 WBCS — SIGNIFICANT CHANGE UP (ref 0–0)
NRBC # FLD: 0 K/UL — SIGNIFICANT CHANGE UP (ref 0–0)
PLATELET # BLD AUTO: 145 K/UL — LOW (ref 150–400)
POTASSIUM SERPL-MCNC: 5.6 MMOL/L — HIGH (ref 3.5–5.3)
POTASSIUM SERPL-MCNC: 6 MMOL/L — HIGH (ref 3.5–5.3)
POTASSIUM SERPL-SCNC: 5.6 MMOL/L — HIGH (ref 3.5–5.3)
POTASSIUM SERPL-SCNC: 6 MMOL/L — HIGH (ref 3.5–5.3)
PROT SERPL-MCNC: 8.5 G/DL — HIGH (ref 6–8.3)
PROTHROM AB SERPL-ACNC: 13 SEC — SIGNIFICANT CHANGE UP (ref 10.5–13.4)
RAPID RVP RESULT: DETECTED
RBC # BLD: 4.41 M/UL — SIGNIFICANT CHANGE UP (ref 4.2–5.8)
RBC # FLD: 13.9 % — SIGNIFICANT CHANGE UP (ref 10.3–14.5)
RSV RNA SPEC QL NAA+PROBE: SIGNIFICANT CHANGE UP
RV+EV RNA SPEC QL NAA+PROBE: SIGNIFICANT CHANGE UP
SARS-COV-2 RNA SPEC QL NAA+PROBE: DETECTED
SODIUM SERPL-SCNC: 141 MMOL/L — SIGNIFICANT CHANGE UP (ref 135–145)
SODIUM SERPL-SCNC: 143 MMOL/L — SIGNIFICANT CHANGE UP (ref 135–145)
WBC # BLD: 7.72 K/UL — SIGNIFICANT CHANGE UP (ref 3.8–10.5)
WBC # FLD AUTO: 7.72 K/UL — SIGNIFICANT CHANGE UP (ref 3.8–10.5)

## 2023-01-25 PROCEDURE — 99223 1ST HOSP IP/OBS HIGH 75: CPT

## 2023-01-25 PROCEDURE — 71045 X-RAY EXAM CHEST 1 VIEW: CPT | Mod: 26

## 2023-01-25 PROCEDURE — 99285 EMERGENCY DEPT VISIT HI MDM: CPT

## 2023-01-25 RX ORDER — FOLIC ACID 0.8 MG
1 TABLET ORAL DAILY
Refills: 0 | Status: DISCONTINUED | OUTPATIENT
Start: 2023-01-25 | End: 2023-01-28

## 2023-01-25 RX ORDER — ASPIRIN/CALCIUM CARB/MAGNESIUM 324 MG
81 TABLET ORAL DAILY
Refills: 0 | Status: DISCONTINUED | OUTPATIENT
Start: 2023-01-25 | End: 2023-01-26

## 2023-01-25 RX ORDER — SODIUM CHLORIDE 9 MG/ML
1000 INJECTION, SOLUTION INTRAVENOUS
Refills: 0 | Status: DISCONTINUED | OUTPATIENT
Start: 2023-01-25 | End: 2023-01-28

## 2023-01-25 RX ORDER — REMDESIVIR 5 MG/ML
INJECTION INTRAVENOUS
Refills: 0 | Status: COMPLETED | OUTPATIENT
Start: 2023-01-26 | End: 2023-01-28

## 2023-01-25 RX ORDER — SODIUM CHLORIDE 9 MG/ML
200 INJECTION INTRAMUSCULAR; INTRAVENOUS; SUBCUTANEOUS ONCE
Refills: 0 | Status: DISCONTINUED | OUTPATIENT
Start: 2023-01-25 | End: 2023-01-28

## 2023-01-25 RX ORDER — INSULIN LISPRO 100/ML
VIAL (ML) SUBCUTANEOUS
Refills: 0 | Status: DISCONTINUED | OUTPATIENT
Start: 2023-01-25 | End: 2023-01-27

## 2023-01-25 RX ORDER — ACETAMINOPHEN 500 MG
650 TABLET ORAL EVERY 6 HOURS
Refills: 0 | Status: DISCONTINUED | OUTPATIENT
Start: 2023-01-25 | End: 2023-01-28

## 2023-01-25 RX ORDER — CHLORHEXIDINE GLUCONATE 213 G/1000ML
1 SOLUTION TOPICAL DAILY
Refills: 0 | Status: DISCONTINUED | OUTPATIENT
Start: 2023-01-25 | End: 2023-01-28

## 2023-01-25 RX ORDER — HEPARIN SODIUM 5000 [USP'U]/ML
5000 INJECTION INTRAVENOUS; SUBCUTANEOUS EVERY 8 HOURS
Refills: 0 | Status: DISCONTINUED | OUTPATIENT
Start: 2023-01-25 | End: 2023-01-28

## 2023-01-25 RX ORDER — SIMVASTATIN 20 MG/1
20 TABLET, FILM COATED ORAL AT BEDTIME
Refills: 0 | Status: DISCONTINUED | OUTPATIENT
Start: 2023-01-25 | End: 2023-01-28

## 2023-01-25 RX ORDER — DEXTROSE 50 % IN WATER 50 %
15 SYRINGE (ML) INTRAVENOUS ONCE
Refills: 0 | Status: DISCONTINUED | OUTPATIENT
Start: 2023-01-25 | End: 2023-01-28

## 2023-01-25 RX ORDER — GLUCAGON INJECTION, SOLUTION 0.5 MG/.1ML
1 INJECTION, SOLUTION SUBCUTANEOUS ONCE
Refills: 0 | Status: DISCONTINUED | OUTPATIENT
Start: 2023-01-25 | End: 2023-01-28

## 2023-01-25 RX ORDER — SENNA PLUS 8.6 MG/1
2 TABLET ORAL AT BEDTIME
Refills: 0 | Status: DISCONTINUED | OUTPATIENT
Start: 2023-01-25 | End: 2023-01-28

## 2023-01-25 RX ORDER — ATENOLOL 25 MG/1
50 TABLET ORAL DAILY
Refills: 0 | Status: DISCONTINUED | OUTPATIENT
Start: 2023-01-25 | End: 2023-01-28

## 2023-01-25 RX ORDER — CALCIUM ACETATE 667 MG
667 TABLET ORAL
Refills: 0 | Status: DISCONTINUED | OUTPATIENT
Start: 2023-01-25 | End: 2023-01-28

## 2023-01-25 RX ORDER — INSULIN HUMAN 100 [IU]/ML
5 INJECTION, SOLUTION SUBCUTANEOUS ONCE
Refills: 0 | Status: COMPLETED | OUTPATIENT
Start: 2023-01-25 | End: 2023-01-25

## 2023-01-25 RX ORDER — DEXTROSE 50 % IN WATER 50 %
25 SYRINGE (ML) INTRAVENOUS ONCE
Refills: 0 | Status: DISCONTINUED | OUTPATIENT
Start: 2023-01-25 | End: 2023-01-28

## 2023-01-25 RX ORDER — SODIUM CHLORIDE 9 MG/ML
500 INJECTION INTRAMUSCULAR; INTRAVENOUS; SUBCUTANEOUS ONCE
Refills: 0 | Status: COMPLETED | OUTPATIENT
Start: 2023-01-25 | End: 2023-01-25

## 2023-01-25 RX ORDER — ACETAMINOPHEN 500 MG
1000 TABLET ORAL ONCE
Refills: 0 | Status: COMPLETED | OUTPATIENT
Start: 2023-01-25 | End: 2023-01-25

## 2023-01-25 RX ORDER — ACETAMINOPHEN 500 MG
650 TABLET ORAL ONCE
Refills: 0 | Status: COMPLETED | OUTPATIENT
Start: 2023-01-25 | End: 2023-01-25

## 2023-01-25 RX ORDER — DEXTROSE 50 % IN WATER 50 %
25 SYRINGE (ML) INTRAVENOUS ONCE
Refills: 0 | Status: COMPLETED | OUTPATIENT
Start: 2023-01-25 | End: 2023-01-25

## 2023-01-25 RX ORDER — AMLODIPINE BESYLATE 2.5 MG/1
10 TABLET ORAL DAILY
Refills: 0 | Status: DISCONTINUED | OUTPATIENT
Start: 2023-01-25 | End: 2023-01-28

## 2023-01-25 RX ORDER — CALCIUM GLUCONATE 100 MG/ML
1 VIAL (ML) INTRAVENOUS ONCE
Refills: 0 | Status: COMPLETED | OUTPATIENT
Start: 2023-01-25 | End: 2023-01-25

## 2023-01-25 RX ORDER — POLYETHYLENE GLYCOL 3350 17 G/17G
17 POWDER, FOR SOLUTION ORAL DAILY
Refills: 0 | Status: DISCONTINUED | OUTPATIENT
Start: 2023-01-25 | End: 2023-01-28

## 2023-01-25 RX ORDER — SODIUM ZIRCONIUM CYCLOSILICATE 10 G/10G
5 POWDER, FOR SUSPENSION ORAL ONCE
Refills: 0 | Status: COMPLETED | OUTPATIENT
Start: 2023-01-25 | End: 2023-01-25

## 2023-01-25 RX ORDER — LANOLIN ALCOHOL/MO/W.PET/CERES
3 CREAM (GRAM) TOPICAL AT BEDTIME
Refills: 0 | Status: DISCONTINUED | OUTPATIENT
Start: 2023-01-25 | End: 2023-01-28

## 2023-01-25 RX ADMIN — Medication 1 MILLIGRAM(S): at 18:57

## 2023-01-25 RX ADMIN — Medication 2: at 22:05

## 2023-01-25 RX ADMIN — Medication 650 MILLIGRAM(S): at 16:23

## 2023-01-25 RX ADMIN — SIMVASTATIN 20 MILLIGRAM(S): 20 TABLET, FILM COATED ORAL at 22:25

## 2023-01-25 RX ADMIN — POLYETHYLENE GLYCOL 3350 17 GRAM(S): 17 POWDER, FOR SOLUTION ORAL at 18:57

## 2023-01-25 RX ADMIN — SODIUM ZIRCONIUM CYCLOSILICATE 5 GRAM(S): 10 POWDER, FOR SUSPENSION ORAL at 13:29

## 2023-01-25 RX ADMIN — INSULIN HUMAN 5 UNIT(S): 100 INJECTION, SOLUTION SUBCUTANEOUS at 13:29

## 2023-01-25 RX ADMIN — HEPARIN SODIUM 5000 UNIT(S): 5000 INJECTION INTRAVENOUS; SUBCUTANEOUS at 22:25

## 2023-01-25 RX ADMIN — SENNA PLUS 2 TABLET(S): 8.6 TABLET ORAL at 22:25

## 2023-01-25 RX ADMIN — SODIUM CHLORIDE 500 MILLILITER(S): 9 INJECTION INTRAMUSCULAR; INTRAVENOUS; SUBCUTANEOUS at 11:00

## 2023-01-25 RX ADMIN — Medication 81 MILLIGRAM(S): at 18:56

## 2023-01-25 RX ADMIN — Medication 400 MILLIGRAM(S): at 11:25

## 2023-01-25 RX ADMIN — Medication 667 MILLIGRAM(S): at 18:56

## 2023-01-25 RX ADMIN — Medication 3 MILLIGRAM(S): at 22:25

## 2023-01-25 RX ADMIN — Medication 1000 MILLIGRAM(S): at 12:14

## 2023-01-25 RX ADMIN — Medication 100 GRAM(S): at 12:22

## 2023-01-25 RX ADMIN — AMLODIPINE BESYLATE 10 MILLIGRAM(S): 2.5 TABLET ORAL at 18:57

## 2023-01-25 RX ADMIN — Medication 667 MILLIGRAM(S): at 22:24

## 2023-01-25 RX ADMIN — Medication 25 MILLILITER(S): at 13:29

## 2023-01-25 NOTE — CONSULT NOTE ADULT - SUBJECTIVE AND OBJECTIVE BOX
[facility] DIVISION OF KIDNEY DISEASES AND HYPERTENSION -- [phone]  -- INITIAL CONSULT NOTE  --------------------------------------------------------------------------------  HPI: Pt is a 88-year-old Male with Hx of ESRD on HD TIW (MW), DM, HLD who presents to [facility] on 1/25/23 for weakness and fever. pt was tested positive for COVID infection in ER. Initial labs showed serum potassium elevated at 6 (non hemolyzed ). Received medical management- insulin with D50, calcium gluconate and lokelma.   Nephrology team was consulted for ESRD/HD management.     Pt. with ESRD on HD three times a week ([facility]). Last HD was done on Monday 1/23/25 via LUE AVF. Pt goes to [facility] dialysis unit. Follows with Dr. Abreu. DW ~64.5Kg.     Pt seen and examined in ER, family present at bedside. pt awake appears lethargic, Denies SOB, CP, HA, N/V, abdominal or dizziness.       PAST HISTORY  --------------------------------------------------------------------------------  PAST MEDICAL & SURGICAL HISTORY:  Diabetes  HTN (hypertension)  HD MWF  ESRD (end stage renal disease)  AVF (arteriovenous fistula)    FAMILY HISTORY:  FH: diabetes mellitus (Mother)    PAST SOCIAL HISTORY:    ALLERGIES & MEDICATIONS  --------------------------------------------------------------------------------  Allergies    No Known Allergies    Intolerances    Standing Inpatient Medications  dextrose 50% Injectable 25 milliLiter(s) IV Push once  insulin regular  human recombinant 5 Unit(s) IV Push once  sodium zirconium cyclosilicate 5 Gram(s) Oral Once    PRN Inpatient Medications      REVIEW OF SYSTEMS  --------------------------------------------------------------------------------  Gen:  See HPI  Skin: No rashes  Head/Eyes/Ears: No HA,   Respiratory: No dyspnea, cough  CV: No chest pain  GI: No abdominal pain, diarrhea  MSK: No  edema  Heme: No easy bruising or bleeding  Psych: No significant depression    All other systems were reviewed and are negative, except as noted.    VITALS/PHYSICAL EXAM  --------------------------------------------------------------------------------  T(C): 38.5 (01-25-23 @ 11:10), Max: 38.5 (01-25-23 @ 11:10)  HR: 89 (01-25-23 @ 11:10) (84 - 89)  BP: 141/67 (01-25-23 @ 11:10) (141/67 - 141/73)  RR: 20 (01-25-23 @ 11:10) (16 - 20)  SpO2: 100% (01-25-23 @ 11:10) (100% - 100%)  Wt(kg): --    Physical Exam:  	Gen: Appears lethargic  	HEENT: MMM  	Pulm: CTA B/L  	CV: S1S2  	Abd: Soft, +BS   	Ext: No LE edema B/L  	Neuro: Awake  	Skin: Warm and dry  	Vascular access: LUE AVF , palpable thrill and bruit +    LABS/STUDIES  --------------------------------------------------------------------------------              12.2   7.72  >-----------<  145      [01-25-23 @ 11:00]              40.5     143  |  97  |  51  ----------------------------<  99      [01-25-23 @ 11:00]  6.0   |  30  |  14.31        Ca     10.3     [01-25-23 @ 11:00]      Mg     2.20     [01-25-23 @ 11:00]      Phos  6.4     [01-25-23 @ 11:00]    TPro  8.5  /  Alb  5.0  /  TBili  0.5  /  DBili  x   /  AST  16  /  ALT  7   /  AlkPhos  78  [01-25-23 @ 11:00]    PT/INR: PT 13.0 , INR 1.12       [01-25-23 @ 11:00]  PTT: 33.0       [01-25-23 @ 11:00]    Creatinine Trend:  SCr 14.31 [01-25 @ 11:00]

## 2023-01-25 NOTE — ED ADULT NURSE NOTE - OBJECTIVE STATEMENT
Pt received in room 16. Pt A&O4. Ambulatory at baseline. Daughter at bedside. Pt came in with complaints of fever, fatigue and chills. Daughter states he started acting disoriented this morning, "Not remembering where he did all day". Denies N/V/D. Denies any dizziness. Pt has a fistula on left arm, last used at dialysis on monday. PMH of DM, HTN, ESRD. 20g IV placed Right AC. Rectal temp taken 101.3, MD notified. Labs drawn and sent. Medications given per MD orders.

## 2023-01-25 NOTE — PATIENT PROFILE ADULT - FALL HARM RISK - RISK INTERVENTIONS

## 2023-01-25 NOTE — H&P ADULT - NSHPLABSRESULTS_GEN_ALL_CORE
LABS:                        12.2   7.72  )-----------( 145      ( 25 Jan 2023 11:00 )             40.5     01-25    141  |  100  |  56<H>  ----------------------------<  98  5.6<H>   |  25  |  14.63<H>    Ca    9.7      25 Jan 2023 15:15  Phos  6.4     01-25  Mg     2.20     01-25    TPro  8.5<H>  /  Alb  5.0  /  TBili  0.5  /  DBili  x   /  AST  16  /  ALT  7   /  AlkPhos  78  01-25    PT/INR - ( 25 Jan 2023 11:00 )   PT: 13.0 sec;   INR: 1.12 ratio         PTT - ( 25 Jan 2023 11:00 )  PTT:33.0 sec    CAPILLARY BLOOD GLUCOSE      POCT Blood Glucose.: 111 mg/dL (25 Jan 2023 14:40)      RADIOLOGY & ADDITIONAL TESTS: Reviewed.

## 2023-01-25 NOTE — H&P ADULT - NSICDXPASTMEDICALHX_GEN_ALL_CORE_FT
PAST MEDICAL HISTORY:  Diabetes     ESRD (end stage renal disease)     HLD (hyperlipidemia)     HTN (hypertension) HD MWF

## 2023-01-25 NOTE — CONSULT NOTE ADULT - PROBLEM SELECTOR RECOMMENDATION 9
Pt. with ESRD on HD three times a week (MWF). Pt being admitted for COVID infection. Last HD was done on Monday 1/23/25 via LUE AVF. Pt goes to [facility] dialysis unit. Follows with Dr. Abreu. Labs reviewed. Plan for urgent HD today for hyperkalemia. HD consent obtained and kept in pts chart. Dose meds as per HD.

## 2023-01-25 NOTE — ED ADULT TRIAGE NOTE - CHIEF COMPLAINT QUOTE
Pt ambulatory to triage c/o chills starting this AM. Pt denies CP, SOB, abdominal pain. Hx DM, ESRD dialysis MWF.

## 2023-01-25 NOTE — H&P ADULT - NSHPSOCIALHISTORY_GEN_ALL_CORE
denies toxic substances  fully independent and active at baseline  lives with son; 2 daughters nearby   COVID vaccinated

## 2023-01-25 NOTE — H&P ADULT - PROBLEM SELECTOR PLAN 3
- pt with 1d confusion, unable to recognize family members and surroundings prior to arrival to hospital; likely in setting of acute infection as above +/- electrolyte abnormalities d/t need for HD  - now back to baseline, AOx4  - management as above  - continue to monitor

## 2023-01-25 NOTE — H&P ADULT - PROBLEM SELECTOR PLAN 5
- K 6 on arrival, repeat 5.6  - likely in setting of ESRD  - EKG NSR without acute TW changes  - s/p insulin 5U and D50 x1, lokelma 5mg   - urgently taken for HD   - renal following, f/u recs

## 2023-01-25 NOTE — CONSULT NOTE ADULT - PROBLEM SELECTOR RECOMMENDATION 3
Pt with hyperphosphatemia in the setting of ESRD. Pt. on phosphate binders (phoslo 667 mg po TID) with meals. Recommend checking serum phosphorus level. Low phosphorus diet. Monitor serum phosphorus.    If you have any questions, please feel free to contact me  Ben Garcia  Nephrology Fellow  [phone]/ [employer] Teams(Preferred)  (After 5pm or on weekends please page the on-call fellow).

## 2023-01-25 NOTE — H&P ADULT - NSHPPHYSICALEXAM_GEN_ALL_CORE
VITAL SIGNS:  T(C): 37.6 (01-25-23 @ 15:10), Max: 38.5 (01-25-23 @ 11:10)  T(F): 99.7 (01-25-23 @ 15:10), Max: 101.3 (01-25-23 @ 11:10)  HR: 81 (01-25-23 @ 15:10) (78 - 89)  BP: 146/71 (01-25-23 @ 15:10) (141/67 - 149/67)  BP(mean): --  RR: 18 (01-25-23 @ 15:10) (16 - 20)  SpO2: 96% (01-25-23 @ 13:26) (96% - 100%)  Wt(kg): --    PHYSICAL EXAM:  Constitutional: WDWN resting comfortably in bed; NAD  Head: NC/AT  Eyes: PERRL, EOMI, anicteric sclera  ENT: no nasal discharge; MMM  Neck: supple; no JVD  Respiratory: CTA B/L; no W/R/R; on RA without respiratory distress  Cardiac: +S1/S2; RRR; no M/R/G  Gastrointestinal: soft, NT/ND; no rebound or guarding; +BSx4  Extremities: WWP, no clubbing or cyanosis; no peripheral edema  Musculoskeletal: NROM x4; no joint swelling, tenderness or erythema  Vascular: 2+ radial, DP/PT pulses B/L  Dermatologic: skin warm, dry and intact; no rashes, wounds, or scars  Neurologic: AAOx3 (self, hospital, 2023); CNII-XII grossly intact; no focal deficits  Psychiatric: affect and characteristics of appearance, verbalizations, behaviors are appropriate

## 2023-01-25 NOTE — H&P ADULT - ASSESSMENT
Pt is an 89 yo M with PMH T2D, HTN, HLD, and ESRD (LUE AVF, MWF, anuric) p/w F/C, fatigue, and confusion x1d. Found to have hyperkalemia and taken to HD urgently; also with COVID+ on remdesivir for high risk of progression.

## 2023-01-25 NOTE — CONSULT NOTE ADULT - PROBLEM SELECTOR RECOMMENDATION 2
Patient with anemia in the setting of ESRD. Hemoglobin above target range. Will hold off PALMA. Monitor hemoglobin.

## 2023-01-25 NOTE — H&P ADULT - PROBLEM SELECTOR PLAN 1
- pt p/w 1d F/C and fatigue, no known sick contacts and COVID vaccinated  - meeting SIRS criteria on arrival with lactate neg   - RVP+ COVID  - CXR neg  - BCx in lab  - s/p 500cc NS bolus in ER -- defer add'l fluids in setting of ESRD  - c/w tylenol PRN fevers  - start remdesivir x3d for high risk of progression

## 2023-01-25 NOTE — H&P ADULT - NSHPREVIEWOFSYSTEMS_GEN_ALL_CORE
CONSTITUTIONAL: No weakness, +fevers/chills.   EYES/ENT: No visual changes;  No vertigo or throat pain   NECK: No pain or stiffness  RESPIRATORY: No cough, wheezing, hemoptysis; No shortness of breath  CARDIOVASCULAR: No chest pain or palpitations  GASTROINTESTINAL: No abdominal or epigastric pain. No nausea, vomiting, or hematemesis; No diarrhea or constipation. No melena or hematochezia.  GENITOURINARY: No dysuria, frequency or hematuria (anuric+)  NEUROLOGICAL: No numbness or weakness  SKIN: No itching, burning, rashes, or lesions   All other review of systems is negative unless indicated above.

## 2023-01-25 NOTE — H&P ADULT - PROBLEM SELECTOR PLAN 10
- F: s/p 500cc NS in ER; cautious in ESRD  - E: replete cautiously in ESRD  - N: renal diet  - D: hep sq TID  - G: none    code: full  dispo: pending medical optimization, anticipate return to home

## 2023-01-25 NOTE — ED PROVIDER NOTE - ATTENDING APP SHARED VISIT CONTRIBUTION OF CARE
89 yo M hx ESRD (HD via L AVF MWF, last dialyzed 2 days ago), HTN, HLD, presenting for evaluation s/p episode of delirium this morning and new onset fevers. Denies associated cough, chest pain, abdominal pain. No rashes. No sick contacts. Flu and COVID vaccinated. Plan for labs, ekg, meds, tba for dialysis,

## 2023-01-25 NOTE — ED PROVIDER NOTE - OBJECTIVE STATEMENT
This is a 88-year-old male with a past medical history of diabetes hypertension hyperlipidemia end-stage renal disease on dialysis Monday Wednesday Friday last dialysis was Monday he did not go to dialysis today presented to the emergency room for having a fever.  As per daughter patient has been confused this morning.  Daughter states that he was disoriented as to he did not know the time however knew who he was and he who the daughter was.  Currently daughter states that patient is at baseline mental status.  Patient's does not have any complaints at this time denies having any nausea or vomiting chest pain exertional dyspnea denies any new weakness fatigue tiredness denies having any chest pain shortness of breath fever or chills.  Patient did not know he had a fever until he came to the emergency room today and he did not go to dialysis today.  He denies having a cough and does not make any urine at this time.

## 2023-01-25 NOTE — H&P ADULT - PROBLEM SELECTOR PLAN 4
- LUE AVF with HD sessions MWF; anuric at baseline; last attended full session 1/23; missed session today d/t symptoms as above  - on arrival with K 6, BUN 15, Cr 14.31, phos 6.4  - renal consulted, f/u recs  - plan for urgent HD today   - renal diet  - home med: ca-acetate 667mg with meals  - c/w home med

## 2023-01-25 NOTE — ED PROVIDER NOTE - CLINICAL SUMMARY MEDICAL DECISION MAKING FREE TEXT BOX
This is a 88-year-old male with a past medical history of diabetes hypertension hyperlipidemia end-stage renal disease on dialysis Monday Wednesday Friday last dialysis was Monday he did not go to dialysis today presented to the emergency room for having a fever. Patient febrile here in ED we will do labs chest x-ray and reassess

## 2023-01-25 NOTE — H&P ADULT - PROBLEM SELECTOR PROBLEM 7
Please send note stating patient stable for exercise from neurological standpoint HTN (hypertension) Type 2 diabetes mellitus

## 2023-01-25 NOTE — H&P ADULT - PROBLEM SELECTOR PLAN 6
- Hb 12.2 with MCV 91, at baseline per chart review  - likely anemia of chronic disease 2/2 ESRD  - can pursue outpt iron/folate/B12 studies if not already done  - no active s/s bleeding  - keep active T&S

## 2023-01-26 LAB
24R-OH-CALCIDIOL SERPL-MCNC: 58.8 NG/ML — SIGNIFICANT CHANGE UP (ref 30–80)
A1C WITH ESTIMATED AVERAGE GLUCOSE RESULT: 5.2 % — SIGNIFICANT CHANGE UP (ref 4–5.6)
ALBUMIN SERPL ELPH-MCNC: 4 G/DL — SIGNIFICANT CHANGE UP (ref 3.3–5)
ALP SERPL-CCNC: 63 U/L — SIGNIFICANT CHANGE UP (ref 40–120)
ALT FLD-CCNC: 10 U/L — SIGNIFICANT CHANGE UP (ref 4–41)
ANION GAP SERPL CALC-SCNC: 19 MMOL/L — HIGH (ref 7–14)
AST SERPL-CCNC: 32 U/L — SIGNIFICANT CHANGE UP (ref 4–40)
BILIRUB SERPL-MCNC: 0.4 MG/DL — SIGNIFICANT CHANGE UP (ref 0.2–1.2)
BLD GP AB SCN SERPL QL: NEGATIVE — SIGNIFICANT CHANGE UP
BUN SERPL-MCNC: 34 MG/DL — HIGH (ref 7–23)
CALCIUM SERPL-MCNC: 9.5 MG/DL — SIGNIFICANT CHANGE UP (ref 8.4–10.5)
CHLORIDE SERPL-SCNC: 94 MMOL/L — LOW (ref 98–107)
CO2 SERPL-SCNC: 23 MMOL/L — SIGNIFICANT CHANGE UP (ref 22–31)
CREAT SERPL-MCNC: 9.65 MG/DL — HIGH (ref 0.5–1.3)
EGFR: 5 ML/MIN/1.73M2 — LOW
ESTIMATED AVERAGE GLUCOSE: 103 — SIGNIFICANT CHANGE UP
GLUCOSE BLDC GLUCOMTR-MCNC: 106 MG/DL — HIGH (ref 70–99)
GLUCOSE BLDC GLUCOMTR-MCNC: 115 MG/DL — HIGH (ref 70–99)
GLUCOSE BLDC GLUCOMTR-MCNC: 88 MG/DL — SIGNIFICANT CHANGE UP (ref 70–99)
GLUCOSE SERPL-MCNC: 103 MG/DL — HIGH (ref 70–99)
HBV CORE AB SER-ACNC: SIGNIFICANT CHANGE UP
HBV SURFACE AB SER-ACNC: >1000 MIU/ML — SIGNIFICANT CHANGE UP
HBV SURFACE AG SER-ACNC: SIGNIFICANT CHANGE UP
HCV AB S/CO SERPL IA: 0.07 S/CO — SIGNIFICANT CHANGE UP (ref 0–0.99)
HCV AB SERPL-IMP: SIGNIFICANT CHANGE UP
MAGNESIUM SERPL-MCNC: 2.1 MG/DL — SIGNIFICANT CHANGE UP (ref 1.6–2.6)
PHOSPHATE SERPL-MCNC: 6 MG/DL — HIGH (ref 2.5–4.5)
POTASSIUM SERPL-MCNC: 5.7 MMOL/L — HIGH (ref 3.5–5.3)
POTASSIUM SERPL-SCNC: 5.7 MMOL/L — HIGH (ref 3.5–5.3)
PROT SERPL-MCNC: 7.5 G/DL — SIGNIFICANT CHANGE UP (ref 6–8.3)
PTH-INTACT FLD-MCNC: 102 PG/ML — HIGH (ref 15–65)
RH IG SCN BLD-IMP: NEGATIVE — SIGNIFICANT CHANGE UP
SODIUM SERPL-SCNC: 136 MMOL/L — SIGNIFICANT CHANGE UP (ref 135–145)
VIT D25+D1,25 OH+D1,25 PNL SERPL-MCNC: 8.4 PG/ML — LOW (ref 19.9–79.3)

## 2023-01-26 PROCEDURE — 99222 1ST HOSP IP/OBS MODERATE 55: CPT | Mod: GC

## 2023-01-26 PROCEDURE — 99232 SBSQ HOSP IP/OBS MODERATE 35: CPT

## 2023-01-26 RX ORDER — REMDESIVIR 5 MG/ML
200 INJECTION INTRAVENOUS EVERY 24 HOURS
Refills: 0 | Status: COMPLETED | OUTPATIENT
Start: 2023-01-26 | End: 2023-01-26

## 2023-01-26 RX ORDER — REMDESIVIR 5 MG/ML
100 INJECTION INTRAVENOUS EVERY 24 HOURS
Refills: 0 | Status: COMPLETED | OUTPATIENT
Start: 2023-01-27 | End: 2023-01-28

## 2023-01-26 RX ADMIN — Medication 667 MILLIGRAM(S): at 17:20

## 2023-01-26 RX ADMIN — Medication 667 MILLIGRAM(S): at 07:54

## 2023-01-26 RX ADMIN — AMLODIPINE BESYLATE 10 MILLIGRAM(S): 2.5 TABLET ORAL at 07:05

## 2023-01-26 RX ADMIN — SIMVASTATIN 20 MILLIGRAM(S): 20 TABLET, FILM COATED ORAL at 21:38

## 2023-01-26 RX ADMIN — Medication 667 MILLIGRAM(S): at 21:38

## 2023-01-26 RX ADMIN — POLYETHYLENE GLYCOL 3350 17 GRAM(S): 17 POWDER, FOR SOLUTION ORAL at 12:36

## 2023-01-26 RX ADMIN — REMDESIVIR 200 MILLIGRAM(S): 5 INJECTION INTRAVENOUS at 14:22

## 2023-01-26 RX ADMIN — HEPARIN SODIUM 5000 UNIT(S): 5000 INJECTION INTRAVENOUS; SUBCUTANEOUS at 21:38

## 2023-01-26 RX ADMIN — Medication 667 MILLIGRAM(S): at 12:36

## 2023-01-26 RX ADMIN — HEPARIN SODIUM 5000 UNIT(S): 5000 INJECTION INTRAVENOUS; SUBCUTANEOUS at 07:06

## 2023-01-26 RX ADMIN — CHLORHEXIDINE GLUCONATE 1 APPLICATION(S): 213 SOLUTION TOPICAL at 12:36

## 2023-01-26 RX ADMIN — Medication 3 MILLIGRAM(S): at 21:38

## 2023-01-26 RX ADMIN — HEPARIN SODIUM 5000 UNIT(S): 5000 INJECTION INTRAVENOUS; SUBCUTANEOUS at 14:22

## 2023-01-26 RX ADMIN — ATENOLOL 50 MILLIGRAM(S): 25 TABLET ORAL at 07:06

## 2023-01-26 NOTE — PROGRESS NOTE ADULT - PROBLEM SELECTOR PLAN 4
- LUE AVF with HD sessions MWF; anuric at baseline; last attended full session 1/23; missed session on admission d/t symptoms as above  - remains hyperkalemic but specimen is moderately hemolyzed  - renal consulted, f/u recs  - HD per renal   - renal diet  - home med: ca-acetate 667mg with meals  - c/w home med

## 2023-01-26 NOTE — PROGRESS NOTE ADULT - SUBJECTIVE AND OBJECTIVE BOX
Patient is a 88y old  Male who presents with a chief complaint of COVID, ESRD requiring HD (25 Jan 2023 17:16)      SUBJECTIVE / OVERNIGHT EVENTS:    No events overnight. This AM, patient without n/v/d/cp/sob.      MEDICATIONS  (STANDING):  amLODIPine   Tablet 10 milliGRAM(s) Oral daily  aspirin  chewable 81 milliGRAM(s) Oral daily  atenolol  Tablet 50 milliGRAM(s) Oral daily  calcium acetate 667 milliGRAM(s) Oral four times a day with meals  chlorhexidine 2% Cloths 1 Application(s) Topical daily  dextrose 5%. 1000 milliLiter(s) (50 mL/Hr) IV Continuous <Continuous>  dextrose 50% Injectable 25 Gram(s) IV Push once  folic acid 1 milliGRAM(s) Oral daily  glucagon  Injectable 1 milliGRAM(s) IntraMuscular once  heparin   Injectable 5000 Unit(s) SubCutaneous every 8 hours  insulin lispro (ADMELOG) corrective regimen sliding scale   SubCutaneous three times a day before meals  melatonin 3 milliGRAM(s) Oral at bedtime  polyethylene glycol 3350 17 Gram(s) Oral daily  remdesivir  IVPB   IV Intermittent   senna 2 Tablet(s) Oral at bedtime  simvastatin 20 milliGRAM(s) Oral at bedtime    MEDICATIONS  (PRN):  acetaminophen     Tablet .. 650 milliGRAM(s) Oral every 6 hours PRN Temp greater or equal to 38C (100.4F), Mild Pain (1 - 3)  dextrose Oral Gel 15 Gram(s) Oral once PRN Blood Glucose LESS THAN 70 milliGRAM(s)/deciliter  sodium chloride 0.9% Bolus. 200 milliLiter(s) IV Bolus once PRN SBP LESS THAN or EQUAL to 90 mmHg      PHYSICAL EXAM:  T(C): 37.2 (01-26-23 @ 06:58), Max: 37.6 (01-25-23 @ 15:10)  HR: 82 (01-26-23 @ 06:58) (71 - 89)  BP: 140/63 (01-26-23 @ 06:58) (135/63 - 149/70)  RR: 17 (01-26-23 @ 06:58) (16 - 18)  SpO2: 99% (01-26-23 @ 06:58) (96% - 100%)  I&O's Summary    25 Jan 2023 07:01 - 26 Jan 2023 07:00  --------------------------------------------------------  IN: 400 mL / OUT: 2200 mL / NET: -1800 mL      GENERAL: NAD, well-developed  HEAD:  Atraumatic, Normocephalic, MMM  CHEST/LUNG: No use of accessory muscles, CTAB, breathing non-labored  COR: RR, no mrcg  ABD: Soft, ND/NT, +BS  PSYCH: AAOx3  NEUROLOGY: CN II-XII grossly intact, moving all extremities  SKIN: No rashes or lesions  EXT: wwp, no cce; L arm AVF+    LABS:  CAPILLARY BLOOD GLUCOSE      POCT Blood Glucose.: 98 mg/dL (26 Jan 2023 07:53)  POCT Blood Glucose.: 130 mg/dL (25 Jan 2023 22:46)  POCT Blood Glucose.: 160 mg/dL (25 Jan 2023 21:29)  POCT Blood Glucose.: 89 mg/dL (25 Jan 2023 17:33)  POCT Blood Glucose.: 111 mg/dL (25 Jan 2023 14:40)  POCT Blood Glucose.: 96 mg/dL (25 Jan 2023 13:22)                          12.2   7.72  )-----------( 145      ( 25 Jan 2023 11:00 )             40.5     01-26    136  |  94<L>  |  34<H>  ----------------------------<  103<H>  5.7<H>   |  23  |  9.65<H>    Ca    9.5      26 Jan 2023 06:22  Phos  6.0     01-26  Mg     2.10     01-26    TPro  7.5  /  Alb  4.0  /  TBili  0.4  /  DBili  x   /  AST  32  /  ALT  10  /  AlkPhos  63  01-26    PT/INR - ( 25 Jan 2023 11:00 )   PT: 13.0 sec;   INR: 1.12 ratio         PTT - ( 25 Jan 2023 11:00 )  PTT:33.0 sec            RADIOLOGY & ADDITIONAL TESTS:    Telemetry Personally Reviewed -     Imaging Personally Reviewed -     Imaging Reviewed -     Consultant(s) Notes Reviewed -       Care Discussed with Consultants/Other Providers -

## 2023-01-26 NOTE — PROGRESS NOTE ADULT - PROBLEM SELECTOR PLAN 1
- pt p/w 1d F/C and fatigue, no known sick contacts and COVID vaccinated  - meeting SIRS criteria on arrival with lactate neg; meets cirteria for sepsis 2/2 COVID-19 infection, present on admission  - RVP+ COVID  - CXR neg  - BCx in lab  - s/p 500cc NS bolus in ER -- defer add'l fluids in setting of ESRD  - c/w tylenol PRN fevers  - c/w remdesivir x3d for high risk of progression

## 2023-01-26 NOTE — PROGRESS NOTE ADULT - PROBLEM SELECTOR PLAN 5
- likely in setting of ESRD vs hemolyzed specimen  - EKG NSR without acute TW changes  - s/p insulin 5U and D50 x1, lokelma 5mg   - HD per renal  - renal following, f/u recs

## 2023-01-26 NOTE — PHYSICAL THERAPY INITIAL EVALUATION ADULT - IMPAIRMENTS FOUND, PT EVAL
Shortness of breath    Patient brought in by EMS from [facility] for shortness of breath cough wheezing for 2 days. 
gait, locomotion, and balance/muscle strength

## 2023-01-27 ENCOUNTER — TRANSCRIPTION ENCOUNTER (OUTPATIENT)
Age: 88
End: 2023-01-27

## 2023-01-27 LAB
ALBUMIN SERPL ELPH-MCNC: 3.6 G/DL — SIGNIFICANT CHANGE UP (ref 3.3–5)
ALP SERPL-CCNC: 53 U/L — SIGNIFICANT CHANGE UP (ref 40–120)
ALT FLD-CCNC: 18 U/L — SIGNIFICANT CHANGE UP (ref 4–41)
ANION GAP SERPL CALC-SCNC: 11 MMOL/L — SIGNIFICANT CHANGE UP (ref 7–14)
ANION GAP SERPL CALC-SCNC: 21 MMOL/L — HIGH (ref 7–14)
ANISOCYTOSIS BLD QL: SLIGHT — SIGNIFICANT CHANGE UP
APTT BLD: 30.1 SEC — SIGNIFICANT CHANGE UP (ref 27–36.3)
AST SERPL-CCNC: 47 U/L — HIGH (ref 4–40)
BASE EXCESS BLDV CALC-SCNC: 6.1 MMOL/L — HIGH (ref -2–3)
BASOPHILS # BLD AUTO: 0.06 K/UL — SIGNIFICANT CHANGE UP (ref 0–0.2)
BASOPHILS NFR BLD AUTO: 1.8 % — SIGNIFICANT CHANGE UP (ref 0–2)
BILIRUB SERPL-MCNC: 0.3 MG/DL — SIGNIFICANT CHANGE UP (ref 0.2–1.2)
BLOOD GAS VENOUS COMPREHENSIVE RESULT: SIGNIFICANT CHANGE UP
BUN SERPL-MCNC: 32 MG/DL — HIGH (ref 7–23)
BUN SERPL-MCNC: 73 MG/DL — HIGH (ref 7–23)
CALCIUM SERPL-MCNC: 8.9 MG/DL — SIGNIFICANT CHANGE UP (ref 8.4–10.5)
CALCIUM SERPL-MCNC: 9.7 MG/DL — SIGNIFICANT CHANGE UP (ref 8.4–10.5)
CHLORIDE BLDV-SCNC: 96 MMOL/L — SIGNIFICANT CHANGE UP (ref 96–108)
CHLORIDE SERPL-SCNC: 92 MMOL/L — LOW (ref 98–107)
CHLORIDE SERPL-SCNC: 95 MMOL/L — LOW (ref 98–107)
CO2 BLDV-SCNC: 31.9 MMOL/L — HIGH (ref 22–26)
CO2 SERPL-SCNC: 25 MMOL/L — SIGNIFICANT CHANGE UP (ref 22–31)
CO2 SERPL-SCNC: 31 MMOL/L — SIGNIFICANT CHANGE UP (ref 22–31)
CREAT SERPL-MCNC: 12.57 MG/DL — HIGH (ref 0.5–1.3)
CREAT SERPL-MCNC: 6.61 MG/DL — HIGH (ref 0.5–1.3)
CULTURE RESULTS: SIGNIFICANT CHANGE UP
EGFR: 4 ML/MIN/1.73M2 — LOW
EGFR: 8 ML/MIN/1.73M2 — LOW
EOSINOPHIL # BLD AUTO: 0 K/UL — SIGNIFICANT CHANGE UP (ref 0–0.5)
EOSINOPHIL NFR BLD AUTO: 0 % — SIGNIFICANT CHANGE UP (ref 0–6)
GAS PNL BLDV: 135 MMOL/L — LOW (ref 136–145)
GLUCOSE BLDC GLUCOMTR-MCNC: 106 MG/DL — HIGH (ref 70–99)
GLUCOSE BLDC GLUCOMTR-MCNC: 107 MG/DL — HIGH (ref 70–99)
GLUCOSE BLDC GLUCOMTR-MCNC: 133 MG/DL — HIGH (ref 70–99)
GLUCOSE BLDC GLUCOMTR-MCNC: 224 MG/DL — HIGH (ref 70–99)
GLUCOSE BLDC GLUCOMTR-MCNC: 37 MG/DL — CRITICAL LOW (ref 70–99)
GLUCOSE BLDC GLUCOMTR-MCNC: 59 MG/DL — LOW (ref 70–99)
GLUCOSE BLDC GLUCOMTR-MCNC: 60 MG/DL — LOW (ref 70–99)
GLUCOSE BLDC GLUCOMTR-MCNC: 83 MG/DL — SIGNIFICANT CHANGE UP (ref 70–99)
GLUCOSE BLDC GLUCOMTR-MCNC: 95 MG/DL — SIGNIFICANT CHANGE UP (ref 70–99)
GLUCOSE BLDV-MCNC: 313 MG/DL — HIGH (ref 70–99)
GLUCOSE SERPL-MCNC: 108 MG/DL — HIGH (ref 70–99)
GLUCOSE SERPL-MCNC: 323 MG/DL — HIGH (ref 70–99)
HCO3 BLDV-SCNC: 31 MMOL/L — HIGH (ref 22–29)
HCT VFR BLD CALC: 34 % — LOW (ref 39–50)
HCT VFR BLD CALC: 38.9 % — LOW (ref 39–50)
HCT VFR BLDA CALC: 34 % — LOW (ref 39–51)
HGB BLD CALC-MCNC: 11.2 G/DL — LOW (ref 13–17)
HGB BLD-MCNC: 10.9 G/DL — LOW (ref 13–17)
HGB BLD-MCNC: 12.1 G/DL — LOW (ref 13–17)
HYPOCHROMIA BLD QL: SLIGHT — SIGNIFICANT CHANGE UP
IANC: 1.94 K/UL — SIGNIFICANT CHANGE UP (ref 1.8–7.4)
INR BLD: 1.03 RATIO — SIGNIFICANT CHANGE UP (ref 0.88–1.16)
LACTATE BLDV-MCNC: 1.2 MMOL/L — SIGNIFICANT CHANGE UP (ref 0.5–2)
LYMPHOCYTES # BLD AUTO: 0.67 K/UL — LOW (ref 1–3.3)
LYMPHOCYTES # BLD AUTO: 19.3 % — SIGNIFICANT CHANGE UP (ref 13–44)
MAGNESIUM SERPL-MCNC: 2 MG/DL — SIGNIFICANT CHANGE UP (ref 1.6–2.6)
MAGNESIUM SERPL-MCNC: 2.3 MG/DL — SIGNIFICANT CHANGE UP (ref 1.6–2.6)
MCHC RBC-ENTMCNC: 27.1 PG — SIGNIFICANT CHANGE UP (ref 27–34)
MCHC RBC-ENTMCNC: 27.6 PG — SIGNIFICANT CHANGE UP (ref 27–34)
MCHC RBC-ENTMCNC: 31.1 GM/DL — LOW (ref 32–36)
MCHC RBC-ENTMCNC: 32.1 GM/DL — SIGNIFICANT CHANGE UP (ref 32–36)
MCV RBC AUTO: 86.1 FL — SIGNIFICANT CHANGE UP (ref 80–100)
MCV RBC AUTO: 87 FL — SIGNIFICANT CHANGE UP (ref 80–100)
MONOCYTES # BLD AUTO: 0.63 K/UL — SIGNIFICANT CHANGE UP (ref 0–0.9)
MONOCYTES NFR BLD AUTO: 18.4 % — HIGH (ref 2–14)
NEUTROPHILS # BLD AUTO: 2.09 K/UL — SIGNIFICANT CHANGE UP (ref 1.8–7.4)
NEUTROPHILS NFR BLD AUTO: 60.5 % — SIGNIFICANT CHANGE UP (ref 43–77)
NRBC # BLD: 0 /100 WBCS — SIGNIFICANT CHANGE UP (ref 0–0)
NRBC # FLD: 0 K/UL — SIGNIFICANT CHANGE UP (ref 0–0)
OVALOCYTES BLD QL SMEAR: SIGNIFICANT CHANGE UP
PCO2 BLDV: 43 MMHG — SIGNIFICANT CHANGE UP (ref 42–55)
PH BLDV: 7.46 — HIGH (ref 7.32–7.43)
PHOSPHATE SERPL-MCNC: 4.3 MG/DL — SIGNIFICANT CHANGE UP (ref 2.5–4.5)
PHOSPHATE SERPL-MCNC: 7.2 MG/DL — HIGH (ref 2.5–4.5)
PLAT MORPH BLD: NORMAL — SIGNIFICANT CHANGE UP
PLATELET # BLD AUTO: 108 K/UL — LOW (ref 150–400)
PLATELET # BLD AUTO: 125 K/UL — LOW (ref 150–400)
PLATELET COUNT - ESTIMATE: ABNORMAL
PO2 BLDV: 64 MMHG — SIGNIFICANT CHANGE UP
POIKILOCYTOSIS BLD QL AUTO: SLIGHT — SIGNIFICANT CHANGE UP
POLYCHROMASIA BLD QL SMEAR: SLIGHT — SIGNIFICANT CHANGE UP
POTASSIUM BLDV-SCNC: 4.3 MMOL/L — SIGNIFICANT CHANGE UP (ref 3.5–5.1)
POTASSIUM SERPL-MCNC: 3.6 MMOL/L — SIGNIFICANT CHANGE UP (ref 3.5–5.3)
POTASSIUM SERPL-MCNC: 6.1 MMOL/L — HIGH (ref 3.5–5.3)
POTASSIUM SERPL-SCNC: 3.6 MMOL/L — SIGNIFICANT CHANGE UP (ref 3.5–5.3)
POTASSIUM SERPL-SCNC: 6.1 MMOL/L — HIGH (ref 3.5–5.3)
PROT SERPL-MCNC: 6.7 G/DL — SIGNIFICANT CHANGE UP (ref 6–8.3)
PROTHROM AB SERPL-ACNC: 11.9 SEC — SIGNIFICANT CHANGE UP (ref 10.5–13.4)
RBC # BLD: 3.95 M/UL — LOW (ref 4.2–5.8)
RBC # BLD: 4.47 M/UL — SIGNIFICANT CHANGE UP (ref 4.2–5.8)
RBC # FLD: 13.5 % — SIGNIFICANT CHANGE UP (ref 10.3–14.5)
RBC # FLD: 13.5 % — SIGNIFICANT CHANGE UP (ref 10.3–14.5)
RBC BLD AUTO: ABNORMAL
SAO2 % BLDV: 89.2 % — SIGNIFICANT CHANGE UP
SMUDGE CELLS # BLD: PRESENT — SIGNIFICANT CHANGE UP
SODIUM SERPL-SCNC: 137 MMOL/L — SIGNIFICANT CHANGE UP (ref 135–145)
SODIUM SERPL-SCNC: 138 MMOL/L — SIGNIFICANT CHANGE UP (ref 135–145)
SPECIMEN SOURCE: SIGNIFICANT CHANGE UP
WBC # BLD: 3.45 K/UL — LOW (ref 3.8–10.5)
WBC # BLD: 5.37 K/UL — SIGNIFICANT CHANGE UP (ref 3.8–10.5)
WBC # FLD AUTO: 3.45 K/UL — LOW (ref 3.8–10.5)
WBC # FLD AUTO: 5.37 K/UL — SIGNIFICANT CHANGE UP (ref 3.8–10.5)

## 2023-01-27 PROCEDURE — 99232 SBSQ HOSP IP/OBS MODERATE 35: CPT | Mod: GC

## 2023-01-27 PROCEDURE — 99233 SBSQ HOSP IP/OBS HIGH 50: CPT

## 2023-01-27 RX ORDER — INSULIN LISPRO 100/ML
15 VIAL (ML) SUBCUTANEOUS ONCE
Refills: 0 | Status: DISCONTINUED | OUTPATIENT
Start: 2023-01-27 | End: 2023-01-27

## 2023-01-27 RX ORDER — INSULIN LISPRO 100/ML
10 VIAL (ML) SUBCUTANEOUS ONCE
Refills: 0 | Status: COMPLETED | OUTPATIENT
Start: 2023-01-27 | End: 2023-01-27

## 2023-01-27 RX ORDER — SODIUM ZIRCONIUM CYCLOSILICATE 10 G/10G
10 POWDER, FOR SUSPENSION ORAL ONCE
Refills: 0 | Status: COMPLETED | OUTPATIENT
Start: 2023-01-27 | End: 2023-01-27

## 2023-01-27 RX ORDER — DEXTROSE 50 % IN WATER 50 %
15 SYRINGE (ML) INTRAVENOUS ONCE
Refills: 0 | Status: COMPLETED | OUTPATIENT
Start: 2023-01-27 | End: 2023-01-27

## 2023-01-27 RX ORDER — DEXTROSE 50 % IN WATER 50 %
12.5 SYRINGE (ML) INTRAVENOUS ONCE
Refills: 0 | Status: COMPLETED | OUTPATIENT
Start: 2023-01-27 | End: 2023-01-27

## 2023-01-27 RX ADMIN — Medication 15 GRAM(S): at 14:49

## 2023-01-27 RX ADMIN — CHLORHEXIDINE GLUCONATE 1 APPLICATION(S): 213 SOLUTION TOPICAL at 14:28

## 2023-01-27 RX ADMIN — Medication 667 MILLIGRAM(S): at 19:01

## 2023-01-27 RX ADMIN — HEPARIN SODIUM 5000 UNIT(S): 5000 INJECTION INTRAVENOUS; SUBCUTANEOUS at 20:58

## 2023-01-27 RX ADMIN — Medication 100 MILLIGRAM(S): at 22:08

## 2023-01-27 RX ADMIN — Medication 667 MILLIGRAM(S): at 12:24

## 2023-01-27 RX ADMIN — Medication 3 MILLIGRAM(S): at 20:59

## 2023-01-27 RX ADMIN — HEPARIN SODIUM 5000 UNIT(S): 5000 INJECTION INTRAVENOUS; SUBCUTANEOUS at 14:25

## 2023-01-27 RX ADMIN — SIMVASTATIN 20 MILLIGRAM(S): 20 TABLET, FILM COATED ORAL at 20:59

## 2023-01-27 RX ADMIN — Medication 10 UNIT(S): at 09:48

## 2023-01-27 RX ADMIN — Medication 12.5 MILLILITER(S): at 09:46

## 2023-01-27 RX ADMIN — HEPARIN SODIUM 5000 UNIT(S): 5000 INJECTION INTRAVENOUS; SUBCUTANEOUS at 05:15

## 2023-01-27 RX ADMIN — Medication 667 MILLIGRAM(S): at 20:59

## 2023-01-27 RX ADMIN — Medication 1 MILLIGRAM(S): at 14:25

## 2023-01-27 RX ADMIN — ATENOLOL 50 MILLIGRAM(S): 25 TABLET ORAL at 05:15

## 2023-01-27 RX ADMIN — REMDESIVIR 200 MILLIGRAM(S): 5 INJECTION INTRAVENOUS at 12:44

## 2023-01-27 RX ADMIN — POLYETHYLENE GLYCOL 3350 17 GRAM(S): 17 POWDER, FOR SOLUTION ORAL at 14:26

## 2023-01-27 RX ADMIN — AMLODIPINE BESYLATE 10 MILLIGRAM(S): 2.5 TABLET ORAL at 05:15

## 2023-01-27 RX ADMIN — SODIUM ZIRCONIUM CYCLOSILICATE 10 GRAM(S): 10 POWDER, FOR SUSPENSION ORAL at 12:45

## 2023-01-27 RX ADMIN — Medication 667 MILLIGRAM(S): at 07:52

## 2023-01-27 NOTE — PROGRESS NOTE ADULT - PROBLEM SELECTOR PLAN 1
- pt p/w 1d F/C and fatigue, no known sick contacts and COVID vaccinated  - meets criteria for sepsis 2/2 COVID-19 infection, present on admission  - RVP+ COVID  - CXR neg  - BCx in lab  - s/p 500cc NS bolus in ER -- defer add'l fluids in setting of ESRD  - c/w tylenol PRN fevers  - c/w remdesivir x3d for high risk of progression

## 2023-01-27 NOTE — DISCHARGE NOTE PROVIDER - NSDCQMCOGNITION_NEU_ALL_CORE
Chief Complaint   Patient presents with    Establish Care     2 nd stroke in the last 2 years. 1. Have you been to the ER, urgent care clinic since your last visit? Hospitalized since your last visit? No    2. Have you seen or consulted any other health care providers outside of the [address] [place] [address] since your last visit? Include any pap smears or colon screening.  No No difficulties

## 2023-01-27 NOTE — DISCHARGE NOTE PROVIDER - NSDCACTIVITY_GEN_ALL_CORE
Kev from [facility] requesting to speak to nurse about upcoming appointment and treatment     No restrictions

## 2023-01-27 NOTE — DISCHARGE NOTE PROVIDER - HOSPITAL COURSE
88M with PMH T2D, HTN, ESRD (LUE AVF, MWF, anuric) p/w F/C, fatigue, and confusion x1d. Found to have hyperkalemia and taken to HD urgently; also with COVID+, on remdesivir for high risk of progression.      Problem/Plan - 1:  ·  Problem: Sepsis.   ·  Plan: - pt p/w 1d F/C and fatigue, no known sick contacts and COVID vaccinated  - met criteria for sepsis 2/2 COVID-19 infection, present on admission  - CXR neg  - BCx no growth  - Received Remdesivir x3 days for high risk of progression.     Problem/Plan - 2:  ·  Problem: 2019 novel coronavirus disease (COVID-19).   ·  Plan: - as above.     Problem/Plan - 3:  ·  Problem: Toxic metabolic encephalopathy.   ·  Plan: - pt with 1d confusion, unable to recognize family members and surroundings prior to arrival to hospital; likely in setting of acute infection as above +/- electrolyte abnormalities d/t need for HD  - mental status returned to baseline after dialysis      Problem/Plan - 4:  ·  Problem: ESRD on dialysis.   ·  Plan: - LUE AVF with HD sessions MWF; anuric at baseline; missed HD session on admission d/t symptoms as above  - renal consulted and prescribed HD     88M with PMH T2D, HTN, ESRD (LUE AVF, MWF, anuric) p/w F/C, fatigue, and confusion x1d. Found to have hyperkalemia and taken to HD urgently; also with COVID+, on remdesivir for high risk of progression.     Hospital Course:   Sepsis.   - pt p/w 1d F/C and fatigue, no known sick contacts and COVID vaccinated  - met criteria for sepsis 2/2 COVID-19 infection, present on admission  - CXR neg  - BCx no growth  - Received Remdesivir x3 days for high risk of progression.    2019 novel coronavirus disease (COVID-19).    - as above.    Toxic metabolic encephalopathy.   - pt with 1d confusion, unable to recognize family members and surroundings prior to arrival to hospital; likely in setting of acute infection as above +/- electrolyte abnormalities d/t need for HD  - mental status returned to baseline after dialysis     ESRD on dialysis.   ·  Plan: - LUE AVF with HD sessions MWF; anuric at baseline; missed HD session on admission d/t symptoms as above  - renal consulted and prescribed HD    On 1/28/23, case discussed with Dr. Stewart, pt is medically cleared/stable for discharge to home. HD was reinstated by the . Family and patient understands and agrees with the plan. Medications reviewed and sent as necessary. 88M with PMH T2D, HTN, ESRD (LUE AVF, MWF, anuric) p/w F/C, fatigue, and confusion x1d. Found to have hyperkalemia and taken to HD urgently; also with COVID+, on remdesivir for high risk of progression.     Hospital Course:   Sepsis.   - pt p/w 1d F/C and fatigue, no known sick contacts and COVID vaccinated  - met criteria for sepsis 2/2 COVID-19 infection, present on admission  - CXR neg  - BCx no growth  - Received Remdesivir x3 days for high risk of progression.    2019 novel coronavirus disease (COVID-19).    - as above.    Toxic metabolic encephalopathy.   - pt with 1d confusion, unable to recognize family members and surroundings prior to arrival to hospital; likely in setting of acute infection as above +/- electrolyte abnormalities d/t need for HD  - mental status returned to baseline after dialysis     Hypoglycemic episode  - Pt not having good PO intake as he doesn't really like hospital food  - Pt was given dextrose and insulin cocktail due to hyperkalemia  - Now mental status back to baseline   - Will continue to encourage PO intake     Hyperkalemia  - Nephro following, no objection on discharge   - Resolved  - S/p lokelma and insulin/dextrose cocktail   - Pt to continue with dialysis and educated to monitor closely     ESRD on dialysis.   - LUE AVF with HD sessions MWF; anuric at baseline; missed HD session on admission d/t symptoms as above  - renal consulted and prescribed HD    On 1/28/23, case discussed with Dr. Stewart, pt is medically cleared/stable for discharge to home. HD was reinstated by the . Family and patient understands and agrees with the plan. Medications reviewed and sent as necessary.

## 2023-01-27 NOTE — CHART NOTE - NSCHARTNOTEFT_GEN_A_CORE
Notified by RN that patient fell in the bathroom after using the bathroom as his "hand gave out." Pt was examined at the bedside. Pt is A&Ox4, with no complaints of pain or bruises. Pt denied hitting his head, arm, knees, back or buttocks as he fell straight down on his butt. Per patient, he walked to the bathroom on his own to have bowel movement. Pt had a bit of loose stool and got up to come back to the bed. There was a little bit of water on the floor of the bathroom and as he was trying to avoid the puddle of water, pt's hand gave out when he was trying to grab on to the side poll for more support. Pt denied feeling dizzy or lightheaded at the time of fall.     Upon physical examination, no apparent bruises were noted. Pt remains pain free at this time. As pt denied hitting his head or pain elsewhere, will continue to monitor without further imaging.
Pt had hypoglycemia episode today to 37 and with altered mental status. Vital signs: soft BP, 100% O2 sat, 98.6 rectal temp. Rapid response was called by the RN. After 2 amps of dextrose, pt's mental status returned to baseline of A&Ox4. Hypoglycemia likely in the setting of decreased PO intake (as pt states that he does not like hospital food) and 1 time dose of insulin given in AM along with dextrose push which was given to treat hyperkalemia (likely remained in his system given his end stage renal status). Pt is currently back to baseline. Will continue to encourage patient's PO intake. Dr. Ayala was notified of the event.     Plan:  - Continue with point of care glucose every 4 hrs  - Will follow up blood culture
Pt's repeat BMP this morning showed potassium of 6.1. Dialysis was made aware and bed side dialysis was pushed up to 10am earliest. Case was discussed with the nephrologist who recommended to give 1 push of dextrose gel with 15 units of insulin. As pt's blood sugars are within normal limit but on the softer side, will give 10 units of insulin and monitor.

## 2023-01-27 NOTE — PROGRESS NOTE ADULT - PROBLEM SELECTOR PLAN 4
- LUE AVF with HD sessions MWF; anuric at baseline; last attended full session 1/23; missed session on admission d/t symptoms as above  - remains hyperkalemic today, d/w nephrology, given IV insulin w/dextrose; will also give PO Lokelma; HD today per renal  - renal consulted, f/u recs  - HD per renal   - renal diet  - home med: ca-acetate 667mg with meals  - c/w home med

## 2023-01-27 NOTE — PROGRESS NOTE ADULT - SUBJECTIVE AND OBJECTIVE BOX
Patient is a 88y old  Male who presents with a chief complaint of COVID, ESRD requiring HD (27 Jan 2023 10:10)      SUBJECTIVE / OVERNIGHT EVENTS:    No events overnight. This AM, patient without n/v/d/cp/sob.      MEDICATIONS  (STANDING):  amLODIPine   Tablet 10 milliGRAM(s) Oral daily  atenolol  Tablet 50 milliGRAM(s) Oral daily  calcium acetate 667 milliGRAM(s) Oral four times a day with meals  chlorhexidine 2% Cloths 1 Application(s) Topical daily  dextrose 5%. 1000 milliLiter(s) (50 mL/Hr) IV Continuous <Continuous>  dextrose 50% Injectable 25 Gram(s) IV Push once  folic acid 1 milliGRAM(s) Oral daily  glucagon  Injectable 1 milliGRAM(s) IntraMuscular once  heparin   Injectable 5000 Unit(s) SubCutaneous every 8 hours  insulin lispro (ADMELOG) corrective regimen sliding scale   SubCutaneous three times a day before meals  melatonin 3 milliGRAM(s) Oral at bedtime  polyethylene glycol 3350 17 Gram(s) Oral daily  remdesivir  IVPB 100 milliGRAM(s) IV Intermittent every 24 hours  remdesivir  IVPB   IV Intermittent   senna 2 Tablet(s) Oral at bedtime  simvastatin 20 milliGRAM(s) Oral at bedtime  sodium zirconium cyclosilicate 10 Gram(s) Oral once    MEDICATIONS  (PRN):  acetaminophen     Tablet .. 650 milliGRAM(s) Oral every 6 hours PRN Temp greater or equal to 38C (100.4F), Mild Pain (1 - 3)  dextrose Oral Gel 15 Gram(s) Oral once PRN Blood Glucose LESS THAN 70 milliGRAM(s)/deciliter  guaiFENesin  milliGRAM(s) Oral every 12 hours PRN Phlegm  sodium chloride 0.9% Bolus. 200 milliLiter(s) IV Bolus once PRN SBP LESS THAN or EQUAL to 90 mmHg      PHYSICAL EXAM:  T(C): 36.8 (01-27-23 @ 05:15), Max: 37.8 (01-26-23 @ 12:30)  HR: 75 (01-27-23 @ 05:15) (75 - 85)  BP: 149/64 (01-27-23 @ 05:15) (135/61 - 149/79)  RR: 18 (01-27-23 @ 05:15) (18 - 18)  SpO2: 100% (01-27-23 @ 05:15) (97% - 100%)  I&O's Summary    GENERAL: NAD, well-developed  HEAD:  Atraumatic, Normocephalic, MMM  CHEST/LUNG: No use of accessory muscles, CTAB, breathing non-labored  COR: RR, no mrcg  ABD: Soft, ND/NT, +BS  PSYCH: AAOx3  NEUROLOGY: CN II-XII grossly intact, moving all extremities  SKIN: No rashes or lesions  EXT: wwp, no cce; +L arm AVF    LABS:  CAPILLARY BLOOD GLUCOSE      POCT Blood Glucose.: 95 mg/dL (27 Jan 2023 07:44)  POCT Blood Glucose.: 106 mg/dL (26 Jan 2023 21:28)  POCT Blood Glucose.: 115 mg/dL (26 Jan 2023 16:25)  POCT Blood Glucose.: 88 mg/dL (26 Jan 2023 11:37)                          12.1   5.37  )-----------( 125      ( 27 Jan 2023 07:09 )             38.9     01-27    138  |  92<L>  |  73<H>  ----------------------------<  108<H>  6.1<H>   |  25  |  12.57<H>    Ca    9.7      27 Jan 2023 07:09  Phos  7.2     01-27  Mg     2.30     01-27    TPro  7.5  /  Alb  4.0  /  TBili  0.4  /  DBili  x   /  AST  32  /  ALT  10  /  AlkPhos  63  01-26    PT/INR - ( 25 Jan 2023 11:00 )   PT: 13.0 sec;   INR: 1.12 ratio         PTT - ( 25 Jan 2023 11:00 )  PTT:33.0 sec          Culture - Blood (collected 25 Jan 2023 11:15)  Source: .Blood Blood-Peripheral  Preliminary Report (26 Jan 2023 15:03):    No growth to date.    Culture - Blood (collected 25 Jan 2023 11:00)  Source: .Blood Blood-Peripheral  Preliminary Report (26 Jan 2023 15:03):    No growth to date.        RADIOLOGY & ADDITIONAL TESTS:    Telemetry Personally Reviewed -     Imaging Personally Reviewed -     Imaging Reviewed -     Consultant(s) Notes Reviewed -   nephrology    Care Discussed with Consultants/Other Providers -

## 2023-01-27 NOTE — PROGRESS NOTE ADULT - SUBJECTIVE AND OBJECTIVE BOX
[facility] DIVISION OF KIDNEY DISEASES AND HYPERTENSION -- FOLLOW UP NOTE  --------------------------------------------------------------------------------  Chief Complaint: ESRD/HD management    24 hour events/subjective:  Pt. seen and examined at bedside. Pt. reports feeling better. Denies any SOB, CP, HA, N/V, abdominal pain or dizziness.     PAST HISTORY  --------------------------------------------------------------------------------  No significant changes to PMH, PSH, FHx, SHx, unless otherwise noted    ALLERGIES & MEDICATIONS  --------------------------------------------------------------------------------  Allergies    No Known Allergies    Intolerances    Standing Inpatient Medications  amLODIPine   Tablet 10 milliGRAM(s) Oral daily  atenolol  Tablet 50 milliGRAM(s) Oral daily  calcium acetate 667 milliGRAM(s) Oral four times a day with meals  chlorhexidine 2% Cloths 1 Application(s) Topical daily  dextrose 5%. 1000 milliLiter(s) IV Continuous <Continuous>  dextrose 50% Injectable 25 Gram(s) IV Push once  folic acid 1 milliGRAM(s) Oral daily  glucagon  Injectable 1 milliGRAM(s) IntraMuscular once  heparin   Injectable 5000 Unit(s) SubCutaneous every 8 hours  insulin lispro (ADMELOG) corrective regimen sliding scale   SubCutaneous three times a day before meals  melatonin 3 milliGRAM(s) Oral at bedtime  polyethylene glycol 3350 17 Gram(s) Oral daily  remdesivir  IVPB 100 milliGRAM(s) IV Intermittent every 24 hours  remdesivir  IVPB   IV Intermittent   senna 2 Tablet(s) Oral at bedtime  simvastatin 20 milliGRAM(s) Oral at bedtime    PRN Inpatient Medications  acetaminophen     Tablet .. 650 milliGRAM(s) Oral every 6 hours PRN  dextrose Oral Gel 15 Gram(s) Oral once PRN  guaiFENesin  milliGRAM(s) Oral every 12 hours PRN  sodium chloride 0.9% Bolus. 200 milliLiter(s) IV Bolus once PRN    REVIEW OF SYSTEMS  --------------------------------------------------------------------------------  Gen: No fevers   Respiratory: No dyspnea  CV: No chest pain  GI: No abdominal pain  MSK: No  edema  Neuro: no dizziness   All other systems were reviewed and are negative, except as noted.    VITALS/PHYSICAL EXAM  --------------------------------------------------------------------------------  T(C): 36.8 (01-27-23 @ 05:15), Max: 37.8 (01-26-23 @ 12:30)  HR: 75 (01-27-23 @ 05:15) (75 - 85)  BP: 149/64 (01-27-23 @ 05:15) (135/61 - 149/79)  RR: 18 (01-27-23 @ 05:15) (18 - 18)  SpO2: 100% (01-27-23 @ 05:15) (97% - 100%)  Wt(kg): --  Height (cm): 170.2 (01-25-23 @ 22:47)  Weight (kg): 69.3 (01-25-23 @ 22:47)  BMI (kg/m2): 23.9 (01-25-23 @ 22:47)  BSA (m2): 1.8 (01-25-23 @ 22:47)    Physical Exam:  	Gen: Appears lethargic  	HEENT: MMM  	Pulm: CTA B/L  	CV: S1S2  	Abd: Soft, +BS   	Ext: No LE edema B/L  	Neuro: Awake  	Skin: Warm and dry  	Vascular access: LUE AVF , palpable thrill and bruit +    LABS/STUDIES  --------------------------------------------------------------------------------              12.1   5.37  >-----------<  125      [01-27-23 @ 07:09]              38.9     138  |  92  |  73  ----------------------------<  108      [01-27-23 @ 07:09]  6.1   |  25  |  12.57        Ca     9.7     [01-27-23 @ 07:09]      Mg     2.30     [01-27-23 @ 07:09]      Phos  7.2     [01-27-23 @ 07:09]    TPro  7.5  /  Alb  4.0  /  TBili  0.4  /  DBili  x   /  AST  32  /  ALT  10  /  AlkPhos  63  [01-26-23 @ 06:22]    PT/INR: PT 13.0 , INR 1.12       [01-25-23 @ 11:00]  PTT: 33.0       [01-25-23 @ 11:00]    Creatinine Trend:  SCr 12.57 [01-27 @ 07:09]  SCr 9.65 [01-26 @ 06:22]  SCr 14.63 [01-25 @ 15:15]  SCr 14.31 [01-25 @ 11:00]    PTH -- (Ca --)      [01-26-23 @ 06:22]   102  Vitamin D (25OH) 58.8      [01-26-23 @ 06:22]  TSH 5.09      [05-28-22 @ 06:41]  Lipid: chol 128, TG 74, HDL 50, LDL --      [05-28-22 @ 06:41]    HBsAb >1000.0      [01-25-23 @ 15:15]  HBsAg Nonreact      [01-25-23 @ 15:15]  HBcAb Nonreact      [01-25-23 @ 15:15]  HCV 0.07, Nonreact      [01-25-23 @ 15:15]

## 2023-01-27 NOTE — DISCHARGE NOTE PROVIDER - NSDCMRMEDTOKEN_GEN_ALL_CORE_FT
amLODIPine 10 mg oral tablet: 1 tab(s) orally once a day  aspirin 81 mg oral tablet, chewable: 1 tab(s) orally once a day  atenolol 50 mg oral tablet: 1 tab(s) orally once a day  calcium acetate 667 mg oral tablet: 1 tab(s) orally 3 times a day  folic acid 1 mg oral tablet: 1 tab(s) orally once a day  Januvia 25 mg oral tablet: 1 tab(s) orally once a day   MiraLax oral powder for reconstitution: 17 gram(s) orally once a day  Senna 8.6 mg oral tablet: 1 tab(s) orally once a day (at bedtime)  simvastatin 20 mg oral tablet: 1 tab(s) orally once a day (at bedtime)   acetaminophen 325 mg oral tablet: 2 tab(s) orally every 6 hours, As needed, Temp greater or equal to 38C (100.4F), Mild Pain (1 - 3)  amLODIPine 10 mg oral tablet: 1 tab(s) orally once a day  atenolol 50 mg oral tablet: 1 tab(s) orally once a day  calcium acetate 667 mg oral tablet: 1 tab(s) orally 3 times a day  folic acid 1 mg oral tablet: 1 tab(s) orally once a day  Freestyle Mauri 2 [place]: Dispense 1 [place]  Freestyle Mauri 2 sensors: Apply 1 sensor every 14 days  Freestyle Precision Aidan Strips: Test blood sugar four times a day when you see check blood glucose symbol or when symptoms don&#x27;t match Mauri reading.  MiraLax oral powder for reconstitution: 17 gram(s) orally once a day  Senna 8.6 mg oral tablet: 1 tab(s) orally once a day (at bedtime)  simvastatin 20 mg oral tablet: 1 tab(s) orally once a day (at bedtime)   acetaminophen 325 mg oral tablet: 2 tab(s) orally every 6 hours, As needed, Temp greater or equal to 38C (100.4F), Mild Pain (1 - 3)  amLODIPine 10 mg oral tablet: 1 tab(s) orally once a day  atenolol 50 mg oral tablet: 1 tab(s) orally once a day  calcium acetate 667 mg oral tablet: 1 tab(s) orally 4 times a day with meals  folic acid 1 mg oral tablet: 1 tab(s) orally once a day  Freestyle Mauri 2 [place]: Dispense 1 [place]  Freestyle Mauri 2 sensors: Apply 1 sensor every 14 days  Freestyle Precision Aidan Strips: Test blood sugar four times a day when you see check blood glucose symbol or when symptoms don&#x27;t match Mauri reading.  MiraLax oral powder for reconstitution: 17 gram(s) orally once a day  Senna 8.6 mg oral tablet: 1 tab(s) orally once a day (at bedtime)  simvastatin 20 mg oral tablet: 1 tab(s) orally once a day (at bedtime)

## 2023-01-27 NOTE — PROGRESS NOTE ADULT - PROBLEM SELECTOR PLAN 1
Pt. with ESRD on HD three times a week (MWF). Pt being admitted for COVID infection. Last HD was done on 1/25/23 via LUE AVF. Pt goes to [facility] dialysis unit. Follows with Dr. Abreu. Labs reviewed. Plan for maintenance HD today. Recommend medical management for serum potassium of 6.1 today. Monitor labs and BP. Dose meds as per HD.

## 2023-01-27 NOTE — PROGRESS NOTE ADULT - NSPROGADDITIONALINFOA_GEN_ALL_CORE
Care d/w patient's daughter, Annelise, via phone on 1/27; all questions answered
Updated patient's daughter, Annelise, via phone on 1/26. All questions answered.

## 2023-01-27 NOTE — PROGRESS NOTE ADULT - PROBLEM SELECTOR PLAN 5
- in setting of ESRD   - hyperkalemic again today (not hemolyzed); given IV insulin, dextrose, will also order PO Lokelma  - HD per renal  - renal following, f/u recs

## 2023-01-27 NOTE — DISCHARGE NOTE PROVIDER - NSDCCPCAREPLAN_GEN_ALL_CORE_FT
PRINCIPAL DISCHARGE DIAGNOSIS  Diagnosis: COVID-19  Assessment and Plan of Treatment: You presented to the hospital with possible sepsis and was found to have covid infection. You were treated with remdesivir for 3 days. You remained stable on room air without any respiratory distress or complaints at this time. Please continue with 10 days total qurantine at this time for further management. If there is any symptoms of worsening infection, respiratory distress, high fever, shortenss of breath, please return back to the hospital.      SECONDARY DISCHARGE DIAGNOSES  Diagnosis: Hyperkalemia  Assessment and Plan of Treatment: You were also found to have elevated potassium level and you were taken down for emergent dialysis. Your potassium level was monitored and you recieved lokelma in the hospital. Please continue with dialysis and closely monitor your potassium level as it can cause more problems in the body if potassium level continues to remain high.    Diagnosis: Type 2 diabetes mellitus  Assessment and Plan of Treatment: You had a history of diabetes and your hemoglobin a1c was measured again in the hospital which showed 5.2 at this time. Number indicates that you are no longer in the "Diabetes" zone. You may hold your home medication at this time. However, you were showed frequent low blood glucose, we still recommend you to monitor your sugar level and follow up with your outpatient primary care provider for further management and monitoring.

## 2023-01-27 NOTE — PROVIDER CONTACT NOTE (HYPOGLYCEMIA EVENT) - NS PROVIDER CONTACT BACKGROUND-HYPO
Age: 88y    Gender: Male    POCT Blood Glucose:  106 mg/dL (01-27-23 @ 16:25)  224 mg/dL (01-27-23 @ 15:16)  37 mg/dL (01-27-23 @ 14:58)  60 mg/dL (01-27-23 @ 12:47)  83 mg/dL (01-27-23 @ 11:41)  59 mg/dL (01-27-23 @ 11:40)  95 mg/dL (01-27-23 @ 07:44)  106 mg/dL (01-26-23 @ 21:28)      eMAR:  dextrose 50% Injectable   12.5 milliLiter(s) IV Push (01-27-23 @ 09:46)    dextrose Oral Gel   15 Gram(s) Oral (01-27-23 @ 14:49)    insulin lispro Injectable (ADMELOG).   10 Unit(s) SubCutaneous (01-27-23 @ 09:48)    simvastatin   20 milliGRAM(s) Oral (01-26-23 @ 21:38)

## 2023-01-27 NOTE — ADVANCED PRACTICE NURSE CONSULT - ASSESSMENT
Right arm cleansed with CHG. Under ultrasound guidance, placed AccuCath Ace Intravascular Peripheral Catheter 18G / 5.71cm into Right Cephalic Vein. Brisk  blood return, flushed with 20mls normal saline. Minimal blood loss. Patient tolerated procedure well. CHG dressing placed. All sharps accounted for. LOT# [id number] , REF# [id number]

## 2023-01-27 NOTE — RAPID RESPONSE TEAM SUMMARY - NSSITUATIONBACKGROUNDRRT_GEN_ALL_CORE
Pt is an 87 yo M with PMH T2D, HTN, HLD, and ESRD (LUE AVF, MWF, anuric) p/w F/C, fatigue, and confusion x1d. Found to have hyperkalemia and taken to HD urgently; also with COVID+ on remdesivir for high risk of progression. Patient was hyperkalemic and gave 10U insulin w/ dextrose.  Patient returned from HD and became altered. He was found to have FSG 37 and RRT was called.

## 2023-01-27 NOTE — RAPID RESPONSE TEAM SUMMARY - NSADDTLFINDINGSRRT_GEN_ALL_CORE
VS: BP 99/48, HR 56, Temp 99F, SpO2 100%. Patient was found to have a FSG 37. On physical exam, patient was arousable but lethargic and unable to answer questions. Patient was given 1 amp dextrose. But, it was unclear whether it was administered successfully so another 1 amp was given. Patient immediately became Ax4 and asked why so many people surrounded his bed. . BP 99/48 and RRT ended. Labs were drawn including BCx.

## 2023-01-28 ENCOUNTER — TRANSCRIPTION ENCOUNTER (OUTPATIENT)
Age: 88
End: 2023-01-28

## 2023-01-28 VITALS
HEART RATE: 63 BPM | DIASTOLIC BLOOD PRESSURE: 57 MMHG | OXYGEN SATURATION: 99 % | TEMPERATURE: 98 F | SYSTOLIC BLOOD PRESSURE: 135 MMHG | RESPIRATION RATE: 17 BRPM

## 2023-01-28 LAB
ANION GAP SERPL CALC-SCNC: 13 MMOL/L — SIGNIFICANT CHANGE UP (ref 7–14)
BUN SERPL-MCNC: 46 MG/DL — HIGH (ref 7–23)
CALCIUM SERPL-MCNC: 8.9 MG/DL — SIGNIFICANT CHANGE UP (ref 8.4–10.5)
CHLORIDE SERPL-SCNC: 95 MMOL/L — LOW (ref 98–107)
CO2 SERPL-SCNC: 29 MMOL/L — SIGNIFICANT CHANGE UP (ref 22–31)
CREAT SERPL-MCNC: 8.79 MG/DL — HIGH (ref 0.5–1.3)
EGFR: 5 ML/MIN/1.73M2 — LOW
GLUCOSE BLDC GLUCOMTR-MCNC: 102 MG/DL — HIGH (ref 70–99)
GLUCOSE BLDC GLUCOMTR-MCNC: 72 MG/DL — SIGNIFICANT CHANGE UP (ref 70–99)
GLUCOSE BLDC GLUCOMTR-MCNC: 90 MG/DL — SIGNIFICANT CHANGE UP (ref 70–99)
GLUCOSE SERPL-MCNC: 125 MG/DL — HIGH (ref 70–99)
HCT VFR BLD CALC: 34.6 % — LOW (ref 39–50)
HGB BLD-MCNC: 10.8 G/DL — LOW (ref 13–17)
MAGNESIUM SERPL-MCNC: 2 MG/DL — SIGNIFICANT CHANGE UP (ref 1.6–2.6)
MCHC RBC-ENTMCNC: 27.4 PG — SIGNIFICANT CHANGE UP (ref 27–34)
MCHC RBC-ENTMCNC: 31.2 GM/DL — LOW (ref 32–36)
MCV RBC AUTO: 87.8 FL — SIGNIFICANT CHANGE UP (ref 80–100)
NRBC # BLD: 0 /100 WBCS — SIGNIFICANT CHANGE UP (ref 0–0)
NRBC # FLD: 0 K/UL — SIGNIFICANT CHANGE UP (ref 0–0)
PHOSPHATE SERPL-MCNC: 5.8 MG/DL — HIGH (ref 2.5–4.5)
PLATELET # BLD AUTO: 110 K/UL — LOW (ref 150–400)
POTASSIUM SERPL-MCNC: 4.7 MMOL/L — SIGNIFICANT CHANGE UP (ref 3.5–5.3)
POTASSIUM SERPL-SCNC: 4.7 MMOL/L — SIGNIFICANT CHANGE UP (ref 3.5–5.3)
RBC # BLD: 3.94 M/UL — LOW (ref 4.2–5.8)
RBC # FLD: 13.7 % — SIGNIFICANT CHANGE UP (ref 10.3–14.5)
SARS-COV-2 RNA SPEC QL NAA+PROBE: DETECTED
SODIUM SERPL-SCNC: 137 MMOL/L — SIGNIFICANT CHANGE UP (ref 135–145)
WBC # BLD: 3.25 K/UL — LOW (ref 3.8–10.5)
WBC # FLD AUTO: 3.25 K/UL — LOW (ref 3.8–10.5)

## 2023-01-28 PROCEDURE — 99239 HOSP IP/OBS DSCHRG MGMT >30: CPT

## 2023-01-28 RX ORDER — CALCIUM ACETATE 667 MG
1 TABLET ORAL
Qty: 120 | Refills: 0
Start: 2023-01-28 | End: 2023-02-26

## 2023-01-28 RX ORDER — CALCIUM ACETATE 667 MG
1 TABLET ORAL
Qty: 120 | Refills: 0 | DISCHARGE
Start: 2023-01-28 | End: 2023-02-26

## 2023-01-28 RX ORDER — CALCIUM ACETATE 667 MG
1 TABLET ORAL
Qty: 0 | Refills: 0 | DISCHARGE

## 2023-01-28 RX ORDER — ACETAMINOPHEN 500 MG
2 TABLET ORAL
Qty: 0 | Refills: 0 | DISCHARGE
Start: 2023-01-28

## 2023-01-28 RX ADMIN — ATENOLOL 50 MILLIGRAM(S): 25 TABLET ORAL at 06:25

## 2023-01-28 RX ADMIN — Medication 667 MILLIGRAM(S): at 08:11

## 2023-01-28 RX ADMIN — AMLODIPINE BESYLATE 10 MILLIGRAM(S): 2.5 TABLET ORAL at 06:25

## 2023-01-28 RX ADMIN — POLYETHYLENE GLYCOL 3350 17 GRAM(S): 17 POWDER, FOR SOLUTION ORAL at 12:57

## 2023-01-28 RX ADMIN — CHLORHEXIDINE GLUCONATE 1 APPLICATION(S): 213 SOLUTION TOPICAL at 12:57

## 2023-01-28 RX ADMIN — HEPARIN SODIUM 5000 UNIT(S): 5000 INJECTION INTRAVENOUS; SUBCUTANEOUS at 13:42

## 2023-01-28 RX ADMIN — REMDESIVIR 200 MILLIGRAM(S): 5 INJECTION INTRAVENOUS at 12:57

## 2023-01-28 RX ADMIN — Medication 1 MILLIGRAM(S): at 12:56

## 2023-01-28 RX ADMIN — HEPARIN SODIUM 5000 UNIT(S): 5000 INJECTION INTRAVENOUS; SUBCUTANEOUS at 06:25

## 2023-01-28 RX ADMIN — Medication 667 MILLIGRAM(S): at 12:56

## 2023-01-28 NOTE — PROGRESS NOTE ADULT - PROBLEM SELECTOR PLAN 5
- in setting of ESRD   - hyperkalemic again today (not hemolyzed); given IV insulin, dextrose, will also order PO Lokelma  - HD per renal  - renal following, f/u recs - in setting of ESRD   - hyperkalemic yesterday ,  given IV insulin, dextrose and PO Lokelma, Now resolved, s/p HD   - HD per renal  - renal following, f/u recs

## 2023-01-28 NOTE — PROGRESS NOTE ADULT - PROBLEM SELECTOR PLAN 6
- Hb at baseline per chart review  - likely anemia of chronic disease 2/2 ESRD  - can pursue outpt iron/folate/B12 studies if not already done  - no active s/s bleeding

## 2023-01-28 NOTE — PROGRESS NOTE ADULT - PROBLEM SELECTOR PLAN 2
- as above
Patient with anemia in the setting of ESRD. Hemoglobin above target range. Will hold off PALMA. Monitor hemoglobin

## 2023-01-28 NOTE — DISCHARGE NOTE NURSING/CASE MANAGEMENT/SOCIAL WORK - HAS THE PATIENT RECEIVED THE INFLUENZA VACCINE THIS SEASON?
yes... Cheek-To-Nose Interpolation Flap Text: A decision was made to reconstruct the defect utilizing an interpolation axial flap and a staged reconstruction.  A telfa template was made of the defect.  This telfa template was then used to outline the Cheek-To-Nose Interpolation flap.  The donor area for the pedicle flap was then injected with anesthesia.  The flap was excised through the skin and subcutaneous tissue down to the layer of the underlying musculature.  The interpolation flap was carefully excised within this deep plane to maintain its blood supply.  The edges of the donor site were undermined.   The donor site was closed in a primary fashion.  The pedicle was then rotated into position and sutured.  Once the tube was sutured into place, adequate blood supply was confirmed with blanching and refill.  The pedicle was then wrapped with xeroform gauze and dressed appropriately with a telfa and gauze bandage to ensure continued blood supply and protect the attached pedicle.

## 2023-01-28 NOTE — PROGRESS NOTE ADULT - PROBLEM SELECTOR PLAN 7
- home med: Januvia 25mg daily  - A1C at goal  - mISS while inpt - home med: Januvia 25mg daily  - A1C 5.2   - can hold and follow up with PMD as outpt as pt was hypoglycemic yesterday

## 2023-01-28 NOTE — PROGRESS NOTE ADULT - ASSESSMENT
Pt with ESRD on HD TIW
Pt is an 89 yo M with PMH T2D, HTN, HLD, and ESRD (LUE AVF, MWF, anuric) p/w F/C, fatigue, and confusion x1d. Found to have hyperkalemia and taken to HD urgently; also with COVID+ on remdesivir for high risk of progression. 
Pt is an 87 yo M with PMH T2D, HTN, HLD, and ESRD (LUE AVF, MWF, anuric) p/w F/C, fatigue, and confusion x1d. Found to have hyperkalemia and taken to HD urgently; also with COVID+ on remdesivir for high risk of progression. 
Pt is an 87 yo M with PMH T2D, HTN, HLD, and ESRD (LUE AVF, MWF, anuric) p/w F/C, fatigue, and confusion x1d. Found to have hyperkalemia and taken to HD urgently; also with COVID+ on remdesivir for high risk of progression.

## 2023-01-28 NOTE — DISCHARGE NOTE NURSING/CASE MANAGEMENT/SOCIAL WORK - NSDCVIVACCINE_GEN_ALL_CORE_FT
Tdap; 30-May-2015 23:48; Jennifer Beard (CAYETANO); [employer]; [id number]; IntraMuscular; Deltoid Right.; 0.5 milliLiter(s); VIS (VIS Published: 09-May-2013, VIS Presented: 30-May-2015);

## 2023-01-28 NOTE — PROGRESS NOTE ADULT - PROBLEM SELECTOR PLAN 3
- pt with 1d confusion, unable to recognize family members and surroundings prior to arrival to hospital; likely in setting of acute infection as above +/- electrolyte abnormalities d/t need for HD  - mental status now back to baseline, AOx4  - management as above  - continue to monitor
Pt with hyperphosphatemia in the setting of ESRD. Pt. on phosphate binders (phoslo 667 mg po TID) with meals. Low phosphorus diet. Monitor serum phosphorus.    If you have any questions, please feel free to contact me  Ben Garcia  Nephrology Fellow  [phone]/ [employer] Teams(Preferred)  (After 5pm or on weekends please page the on-call fellow).
- pt with 1d confusion, unable to recognize family members and surroundings prior to arrival to hospital; likely in setting of acute infection as above +/- electrolyte abnormalities d/t need for HD  - mental status now back to baseline, AOx4  - management as above  - continue to monitor
- pt with 1d confusion, unable to recognize family members and surroundings prior to arrival to hospital; likely in setting of acute infection as above +/- electrolyte abnormalities d/t need for HD  - mental status now back to baseline, AOx4  - management as above  - continue to monitor

## 2023-01-28 NOTE — PROGRESS NOTE ADULT - PROBLEM SELECTOR PLAN 1
- pt p/w 1d F/C and fatigue, no known sick contacts and COVID vaccinated  - meets criteria for sepsis 2/2 COVID-19 infection, present on admission  - RVP+ COVID  - CXR neg  - BCx in lab  - s/p 500cc NS bolus in ER -- defer add'l fluids in setting of ESRD  - c/w tylenol PRN fevers  - c/w remdesivir x3d for high risk of progression - pt p/w 1d F/C and fatigue, no known sick contacts and COVID vaccinated  - meets criteria for sepsis 2/2 COVID-19 infection, present on admission  - RVP+ COVID  - CXR neg  - BCx in lab  - s/p 500cc NS bolus in ER -- defer add'l fluids in setting of ESRD  - c/w tylenol PRN fevers  - c/w remdesivir x3d for high risk of progression  -pt s/p RRT yesterday for hypoglycemia , Hypoglycemia likely in the setting of decreased PO intake  and 1 time dose of insulin given in AM along with dextrose push which was given to treat hyperkalemia ,likely remained in his system given his end stage renal status Pt at baseline MS today, wants to go home  blood cx sent during RRT, Pt and family informed, if positive, may need to come back to ED

## 2023-01-28 NOTE — PROGRESS NOTE ADULT - PROBLEM SELECTOR PLAN 9
- home med: simvastatin 20mg daily   - c/w home med

## 2023-01-28 NOTE — PROGRESS NOTE ADULT - PROBLEM SELECTOR PROBLEM 2
2019 novel coronavirus disease (COVID-19)
Anemia secondary to renal failure
2019 novel coronavirus disease (COVID-19)
2019 novel coronavirus disease (COVID-19)

## 2023-01-28 NOTE — PROGRESS NOTE ADULT - PROBLEM SELECTOR PLAN 8
- home meds: amlodipine 10mg daily, atenolol 50mg daily  - c/w home meds

## 2023-01-28 NOTE — PROGRESS NOTE ADULT - PROBLEM SELECTOR PROBLEM 6
Anemia secondary to renal failure

## 2023-01-28 NOTE — PROGRESS NOTE ADULT - PROBLEM SELECTOR PLAN 10
- F: s/p 500cc NS in ER; cautious in ESRD  - E: replete cautiously in ESRD  - N: renal diet  - D: hep sq TID  - G: none    code: full  dispo: pending medical optimization, anticipate return to home - F: s/p 500cc NS in ER; cautious in ESRD  - E: replete cautiously in ESRD  - N: renal diet  - D: hep sq TID  - G: none    code: full  dispo: DC home today , no skilled PT needs , HD reinstated  Patient hemodynamically stable for discharge home  Time spent in discharge process is 40 min  plan of care d/w pt and family at bedside   case d/w ACP

## 2023-01-28 NOTE — PROGRESS NOTE ADULT - SUBJECTIVE AND OBJECTIVE BOX
Patient is a 88y old  Male who presents with a chief complaint of COVID, ESRD requiring HD (27 Jan 2023 13:05)      SUBJECTIVE / OVERNIGHT EVENTS:    MEDICATIONS  (STANDING):  amLODIPine   Tablet 10 milliGRAM(s) Oral daily  atenolol  Tablet 50 milliGRAM(s) Oral daily  calcium acetate 667 milliGRAM(s) Oral four times a day with meals  chlorhexidine 2% Cloths 1 Application(s) Topical daily  dextrose 5%. 1000 milliLiter(s) (50 mL/Hr) IV Continuous <Continuous>  dextrose 50% Injectable 25 Gram(s) IV Push once  folic acid 1 milliGRAM(s) Oral daily  glucagon  Injectable 1 milliGRAM(s) IntraMuscular once  heparin   Injectable 5000 Unit(s) SubCutaneous every 8 hours  melatonin 3 milliGRAM(s) Oral at bedtime  polyethylene glycol 3350 17 Gram(s) Oral daily  remdesivir  IVPB 100 milliGRAM(s) IV Intermittent every 24 hours  remdesivir  IVPB   IV Intermittent   senna 2 Tablet(s) Oral at bedtime  simvastatin 20 milliGRAM(s) Oral at bedtime    MEDICATIONS  (PRN):  acetaminophen     Tablet .. 650 milliGRAM(s) Oral every 6 hours PRN Temp greater or equal to 38C (100.4F), Mild Pain (1 - 3)  dextrose Oral Gel 15 Gram(s) Oral once PRN Blood Glucose LESS THAN 70 milliGRAM(s)/deciliter  guaiFENesin  milliGRAM(s) Oral every 12 hours PRN Phlegm  sodium chloride 0.9% Bolus. 200 milliLiter(s) IV Bolus once PRN SBP LESS THAN or EQUAL to 90 mmHg      Vital Signs Last 24 Hrs  T(C): 36.7 (27 Jan 2023 22:00), Max: 37.2 (27 Jan 2023 14:00)  T(F): 98 (27 Jan 2023 22:00), Max: 98.9 (27 Jan 2023 14:00)  HR: 63 (27 Jan 2023 22:00) (63 - 68)  BP: 137/53 (27 Jan 2023 22:00) (122/59 - 137/53)  BP(mean): --  RR: 18 (27 Jan 2023 22:00) (18 - 18)  SpO2: 100% (27 Jan 2023 22:00) (99% - 100%)    Parameters below as of 27 Jan 2023 22:00  Patient On (Oxygen Delivery Method): room air      CAPILLARY BLOOD GLUCOSE      POCT Blood Glucose.: 90 mg/dL (28 Jan 2023 07:25)  POCT Blood Glucose.: 72 mg/dL (28 Jan 2023 04:01)  POCT Blood Glucose.: 107 mg/dL (27 Jan 2023 23:53)  POCT Blood Glucose.: 133 mg/dL (27 Jan 2023 20:18)  POCT Blood Glucose.: 106 mg/dL (27 Jan 2023 16:25)  POCT Blood Glucose.: 224 mg/dL (27 Jan 2023 15:16)  POCT Blood Glucose.: 37 mg/dL (27 Jan 2023 14:58)  POCT Blood Glucose.: 60 mg/dL (27 Jan 2023 12:47)  POCT Blood Glucose.: 83 mg/dL (27 Jan 2023 11:41)  POCT Blood Glucose.: 59 mg/dL (27 Jan 2023 11:40)    I&O's Summary    27 Jan 2023 07:01  -  28 Jan 2023 07:00  --------------------------------------------------------  IN: 500 mL / OUT: 850 mL / NET: -350 mL        PHYSICAL EXAM:  GENERAL: NAD, well-developed  HEAD:  Atraumatic, Normocephalic  EYES: EOMI, PERRLA, conjunctiva and sclera clear  NECK: Supple, No JVD  CHEST/LUNG: Clear to auscultation bilaterally; No wheeze  HEART: Regular rate and rhythm; No murmurs, rubs, or gallops  ABDOMEN: Soft, Nontender, Nondistended; Bowel sounds present  EXTREMITIES:  2+ Peripheral Pulses, No clubbing, cyanosis, or edema  PSYCH: AAOx3  NEUROLOGY: non-focal  SKIN: No rashes or lesions    LABS:                        10.8   3.25  )-----------( 110      ( 28 Jan 2023 06:13 )             34.6     01-28    137  |  95<L>  |  46<H>  ----------------------------<  125<H>  4.7   |  29  |  8.79<H>    Ca    8.9      28 Jan 2023 06:13  Phos  5.8     01-28  Mg     2.00     01-28    TPro  6.7  /  Alb  3.6  /  TBili  0.3  /  DBili  x   /  AST  47<H>  /  ALT  18  /  AlkPhos  53  01-27    PT/INR - ( 27 Jan 2023 15:15 )   PT: 11.9 sec;   INR: 1.03 ratio         PTT - ( 27 Jan 2023 15:15 )  PTT:30.1 sec          RADIOLOGY & ADDITIONAL TESTS:    Imaging Personally Reviewed:    Consultant(s) Notes Reviewed:      Care Discussed with Consultants/Other Providers:   Patient is a 88y old  Male who presents with a chief complaint of COVID, ESRD requiring HD (27 Jan 2023 13:05)      SUBJECTIVE / OVERNIGHT EVENTS: patient seen and examined by bedside, pt afebrile, on RA , denies headache, dizziness, SOB, CP, Palpitations , N/V/D, abdominal pain  Pt had RRT yesterday for hypoglycemia , Also s/p fall in the bathroom, no injury reported   Pt wants to to go home today     MEDICATIONS  (STANDING):  amLODIPine   Tablet 10 milliGRAM(s) Oral daily  atenolol  Tablet 50 milliGRAM(s) Oral daily  calcium acetate 667 milliGRAM(s) Oral four times a day with meals  chlorhexidine 2% Cloths 1 Application(s) Topical daily  dextrose 5%. 1000 milliLiter(s) (50 mL/Hr) IV Continuous <Continuous>  dextrose 50% Injectable 25 Gram(s) IV Push once  folic acid 1 milliGRAM(s) Oral daily  glucagon  Injectable 1 milliGRAM(s) IntraMuscular once  heparin   Injectable 5000 Unit(s) SubCutaneous every 8 hours  melatonin 3 milliGRAM(s) Oral at bedtime  polyethylene glycol 3350 17 Gram(s) Oral daily  remdesivir  IVPB 100 milliGRAM(s) IV Intermittent every 24 hours  remdesivir  IVPB   IV Intermittent   senna 2 Tablet(s) Oral at bedtime  simvastatin 20 milliGRAM(s) Oral at bedtime    MEDICATIONS  (PRN):  acetaminophen     Tablet .. 650 milliGRAM(s) Oral every 6 hours PRN Temp greater or equal to 38C (100.4F), Mild Pain (1 - 3)  dextrose Oral Gel 15 Gram(s) Oral once PRN Blood Glucose LESS THAN 70 milliGRAM(s)/deciliter  guaiFENesin  milliGRAM(s) Oral every 12 hours PRN Phlegm  sodium chloride 0.9% Bolus. 200 milliLiter(s) IV Bolus once PRN SBP LESS THAN or EQUAL to 90 mmHg      Vital Signs Last 24 Hrs  T(C): 36.7 (27 Jan 2023 22:00), Max: 37.2 (27 Jan 2023 14:00)  T(F): 98 (27 Jan 2023 22:00), Max: 98.9 (27 Jan 2023 14:00)  HR: 63 (27 Jan 2023 22:00) (63 - 68)  BP: 137/53 (27 Jan 2023 22:00) (122/59 - 137/53)  BP(mean): --  RR: 18 (27 Jan 2023 22:00) (18 - 18)  SpO2: 100% (27 Jan 2023 22:00) (99% - 100%)    Parameters below as of 27 Jan 2023 22:00  Patient On (Oxygen Delivery Method): room air      CAPILLARY BLOOD GLUCOSE      POCT Blood Glucose.: 90 mg/dL (28 Jan 2023 07:25)  POCT Blood Glucose.: 72 mg/dL (28 Jan 2023 04:01)  POCT Blood Glucose.: 107 mg/dL (27 Jan 2023 23:53)  POCT Blood Glucose.: 133 mg/dL (27 Jan 2023 20:18)  POCT Blood Glucose.: 106 mg/dL (27 Jan 2023 16:25)  POCT Blood Glucose.: 224 mg/dL (27 Jan 2023 15:16)  POCT Blood Glucose.: 37 mg/dL (27 Jan 2023 14:58)  POCT Blood Glucose.: 60 mg/dL (27 Jan 2023 12:47)  POCT Blood Glucose.: 83 mg/dL (27 Jan 2023 11:41)  POCT Blood Glucose.: 59 mg/dL (27 Jan 2023 11:40)    I&O's Summary    27 Jan 2023 07:01  -  28 Jan 2023 07:00  --------------------------------------------------------  IN: 500 mL / OUT: 850 mL / NET: -350 mL        PHYSICAL EXAM:  GENERAL: NAD, well-developed  HEAD:  Atraumatic, Normocephalic, MMM  CHEST/LUNG: No use of accessory muscles, CTAB, breathing non-labored  COR: RR, no mrcg  ABD: Soft, ND/NT, +BS  PSYCH: AAOx3  NEUROLOGY: CN II-XII grossly intact, moving all extremities  SKIN: No rashes or lesions  EXT: wwp, no cce; +L arm AVF    LABS:                        10.8   3.25  )-----------( 110      ( 28 Jan 2023 06:13 )             34.6     01-28    137  |  95<L>  |  46<H>  ----------------------------<  125<H>  4.7   |  29  |  8.79<H>    Ca    8.9      28 Jan 2023 06:13  Phos  5.8     01-28  Mg     2.00     01-28    TPro  6.7  /  Alb  3.6  /  TBili  0.3  /  DBili  x   /  AST  47<H>  /  ALT  18  /  AlkPhos  53  01-27    PT/INR - ( 27 Jan 2023 15:15 )   PT: 11.9 sec;   INR: 1.03 ratio         PTT - ( 27 Jan 2023 15:15 )  PTT:30.1 sec          RADIOLOGY & ADDITIONAL TESTS:    Imaging Personally Reviewed:    Consultant(s) Notes Reviewed:      Care Discussed with Consultants/Other Providers:

## 2023-01-28 NOTE — DISCHARGE NOTE NURSING/CASE MANAGEMENT/SOCIAL WORK - PATIENT PORTAL LINK FT
You can access the FollowMyHealth Patient Portal offered by [facility] by registering at the following website: [email or web address]. By joining [employer]’s FollowMyHealth portal, you will also be able to view your health information using other applications (apps) compatible with our system.

## 2023-01-28 NOTE — PROGRESS NOTE ADULT - PROBLEM SELECTOR PROBLEM 3
Hyperphosphatemia
Toxic metabolic encephalopathy

## 2023-01-28 NOTE — PROGRESS NOTE ADULT - PROBLEM SELECTOR PROBLEM 8
1. Follow the meal plan discussed with you today.  Refer to your written materials provided at the visit.     2. Goals you are willing to work on include:     - Count carbohydrate choices choices 2- 3 per meal, 1-2 per snack  - Measure carbohydrate choices    - Eat 3 meals daily, 4-5 hours apart    3. Within your limitations, exercise you are willing to do includes: continue to exercise as able    4. If you have follow up appointments with the dietitian or nurse educator, please bring your meter, log book, food records and folder of education materials to those appointments.     5. Call Anaya with any further questions [phone].     6. Calorie Guillermo for when out to eat   
HTN (hypertension)

## 2023-01-28 NOTE — PROGRESS NOTE ADULT - PROBLEM SELECTOR PLAN 4
- LUE AVF with HD sessions MWF; anuric at baseline; last attended full session 1/23; missed session on admission d/t symptoms as above  - remains hyperkalemic today, d/w nephrology, given IV insulin w/dextrose; will also give PO Lokelma; HD today per renal  - renal consulted, f/u recs  - HD per renal   - renal diet  - home med: ca-acetate 667mg with meals  - c/w home med - LUE AVF with HD sessions MWF; anuric at baseline; last attended full session 1/23; missed session on admission d/t symptoms as above  - renal consulted, recs noted   - HD per renal   -hyperkalemia resolved with HD   - renal diet  - home med: ca-acetate 667mg with meals  - c/w home med

## 2023-01-30 LAB
CULTURE RESULTS: SIGNIFICANT CHANGE UP
CULTURE RESULTS: SIGNIFICANT CHANGE UP
SPECIMEN SOURCE: SIGNIFICANT CHANGE UP
SPECIMEN SOURCE: SIGNIFICANT CHANGE UP

## 2023-04-18 RX ORDER — CALCIUM ACETATE 667 MG/1
667 TABLET ORAL 3 TIMES DAILY
Qty: 180 | Refills: 6 | Status: ACTIVE | COMMUNITY
Start: 2020-10-15 | End: 1900-01-01

## 2023-05-12 ENCOUNTER — APPOINTMENT (OUTPATIENT)
Dept: ENDOVASCULAR SURGERY | Facility: CLINIC | Age: 88
End: 2023-05-12

## 2023-05-12 PROBLEM — E78.5 HYPERLIPIDEMIA, UNSPECIFIED: Chronic | Status: ACTIVE | Noted: 2023-01-25

## 2023-05-29 NOTE — PROGRESS NOTE ADULT - ASSESSMENT
87 yo M w/ HTN, HLD, DM2, ESRD on HD MWF, presenting with hyperkalemia, admitted for HD session, course c/b hypoglycemia.     # Hyperkalemia 2/2 ESRD  - s/p temporizing measures and HD session  - hyperkalemia improved today, K 5  - c/w renal diet  - appreciate CM/SW assistance w/ reinstated outpt HD, patient on M/W/F schedule  - c/w phoslo for hyperphosphatemia  - hgb 13.4, no needs for epo at this time  - currently denies SOB/LE edema, respiratory status is comfortable on RA     #DM2 c/b hypoglycemia, not on long term insulin therapy  - hypoglycemia here in the hospital improved  - continue to monitor FS, monitor PO intake  - patient on januvia 100 mg at home, also reporting symptoms of hypoglycemia (sweats/weakness) at home  - if discharging home on januvia would adjust dose to 25 mg (renal adjusted for patient on intermittent HD)   - otpt f/u PMD for further monitoring  - provide diabetes/glucose monitoring education    # Essential HTN  - c/w amlodipine, monitor BP and adjust regimen as needed    # HLD  - c/w statin    # DVT ppx: HSQ    Dispo: pending reinstatement of otpt HD services, optimization of medications  no

## 2023-05-30 ENCOUNTER — APPOINTMENT (OUTPATIENT)
Dept: VASCULAR SURGERY | Facility: CLINIC | Age: 88
End: 2023-05-30
Payer: MEDICARE

## 2023-05-30 PROCEDURE — 99213 OFFICE O/P EST LOW 20 MIN: CPT

## 2023-05-30 PROCEDURE — 93990 DOPPLER FLOW TESTING: CPT

## 2023-05-30 NOTE — PHYSICAL EXAM
[Thrill] : thrill [Hand well perfused] : hand well perfused [Pulsatile Thrill] : no pulsatile thrill [Aneurysm] : no aneurysm [Bleeding] : no bleeding [Ulcer] : no ulcer [2+] : left 2+ [Normal] : normoactive bowel sounds, soft and nontender, no hepatosplenomegaly or masses appreciated [de-identified] : Small scab superficial over the distal aspect of the AV fistula secondary to recent access over thinned out skin

## 2023-05-30 NOTE — HISTORY OF PRESENT ILLNESS
[FreeTextEntry1] : 88 year old male with ESRD currently on hemodialysis via left upper extremity radial cephalic  AV fistula presents to the office today for routine follow-up.  Patient denies any issues with cannulation or excessive bleeding.\par Patient with small scab over the distal aspect of the AV fistula after recent access over thinned out skin which patient now instructs dialysis to minimize cannulation in thinned areas.

## 2023-05-30 NOTE — DISCUSSION/SUMMARY
[FreeTextEntry1] : 88 year old male with ESRD currenty on hemodialysis via left upper extremity radial cephalic  AV fistula \par \par Duplex shows patent AV fistula with moderate stenosis at the proximal forearm and a flow rate of 1600\par \par Patient advised to avoid area for future access\par \par Patient to follow-up in 3 -4  months with repeat duplex

## 2023-06-06 NOTE — H&P ADULT - RS GEN PE MLT RESP DETAILS PC
patient complains of migraine headaches x couple of days airway patent/breath sounds equal/good air movement/respirations non-labored/clear to auscultation bilaterally/no chest wall tenderness/no rales/no rhonchi/no wheezes

## 2023-06-16 ENCOUNTER — APPOINTMENT (OUTPATIENT)
Dept: ENDOVASCULAR SURGERY | Facility: CLINIC | Age: 88
End: 2023-06-16
Payer: MEDICARE

## 2023-06-16 ENCOUNTER — RESULT REVIEW (OUTPATIENT)
Age: 88
End: 2023-06-16

## 2023-06-16 VITALS
RESPIRATION RATE: 16 BRPM | TEMPERATURE: 97.6 F | SYSTOLIC BLOOD PRESSURE: 160 MMHG | DIASTOLIC BLOOD PRESSURE: 84 MMHG | WEIGHT: 157 LBS | BODY MASS INDEX: 24.64 KG/M2 | HEIGHT: 67 IN | OXYGEN SATURATION: 100 % | HEART RATE: 69 BPM

## 2023-06-16 LAB
HEMOLYSIS INDEX: 29 — SIGNIFICANT CHANGE UP
POTASSIUM SERPL-MCNC: 5.6 MMOL/L — HIGH (ref 3.5–5.3)
POTASSIUM SERPL-SCNC: 5.6 MMOL/L — HIGH (ref 3.5–5.3)

## 2023-06-16 PROCEDURE — 77001 FLUOROGUIDE FOR VEIN DEVICE: CPT

## 2023-06-16 PROCEDURE — 36558 INSERT TUNNELED CV CATH: CPT

## 2023-06-16 PROCEDURE — 76937 US GUIDE VASCULAR ACCESS: CPT

## 2023-06-16 RX ORDER — SODIUM ZIRCONIUM CYCLOSILICATE 10 G/10G
10 POWDER, FOR SUSPENSION ORAL AS DIRECTED
Qty: 12 | Refills: 1 | Status: DISCONTINUED | COMMUNITY
Start: 2021-02-12 | End: 2023-06-16

## 2023-06-16 RX ORDER — ASPIRIN 81 MG/1
81 TABLET, CHEWABLE ORAL
Refills: 0 | Status: DISCONTINUED | COMMUNITY
Start: 2018-07-12 | End: 2023-06-16

## 2023-06-16 RX ORDER — FERROUS SULFATE 325(65) MG
325 (65 FE) TABLET ORAL DAILY
Refills: 0 | Status: DISCONTINUED | COMMUNITY
Start: 2018-07-12 | End: 2023-06-16

## 2023-06-16 NOTE — ASSESSMENT
[Other: _____] : [unfilled] [FreeTextEntry1] : referred from dialysis for non healing ulcer of the left arm fistula, , plan for tunneled catheter insertion

## 2023-06-16 NOTE — HISTORY OF PRESENT ILLNESS
[] : left radiocephalic fistula [FreeTextEntry1] : 5/7/2020 Dr Adams [FreeTextEntry4] : wed 6/14/2023 [FreeTextEntry5] : 9am [FreeTextEntry6] : Dr Munoz

## 2023-06-16 NOTE — PROCEDURE
[Resume diet] : resume diet [FreeTextEntry1] : tunneled catheter insertion right chest [D/C IV on discharge] : D/C IV on discharge [Site check for bleeding/hematoma] : Site check for bleeding/hematoma [Vital signs on admission the q 15 mins x2] : Vital signs on admission the q 15 mins x2

## 2023-06-29 ENCOUNTER — APPOINTMENT (OUTPATIENT)
Dept: VASCULAR SURGERY | Facility: CLINIC | Age: 88
End: 2023-06-29
Payer: MEDICARE

## 2023-06-29 VITALS
HEIGHT: 67 IN | DIASTOLIC BLOOD PRESSURE: 62 MMHG | SYSTOLIC BLOOD PRESSURE: 113 MMHG | BODY MASS INDEX: 24.64 KG/M2 | HEART RATE: 65 BPM | WEIGHT: 157 LBS

## 2023-06-29 DIAGNOSIS — T82.898A OTHER SPECIFIED COMPLICATION OF VASCULAR PROSTHETIC DEVICES, IMPLANTS AND GRAFTS, INITIAL ENCOUNTER: ICD-10-CM

## 2023-06-29 PROCEDURE — 99212 OFFICE O/P EST SF 10 MIN: CPT

## 2023-06-29 NOTE — PHYSICAL EXAM
[Thrill] : thrill [Hand well perfused] : hand well perfused [2+] : left 2+ [Normal] : coordination grossly intact, no focal deficits [Redness surrounding] : no redness surrounding [Drainage] : no drainage [Pulsatile Thrill] : no pulsatile thrill [Aneurysm] : no aneurysm [Bleeding] : no bleeding [Ulcer] : no ulcer [de-identified] : Intact

## 2023-06-29 NOTE — HISTORY OF PRESENT ILLNESS
[FreeTextEntry1] : 88 year old male with ESRD s/p left upper extremity radiocephalic  AV fistula currently receiving hemodialysis in the right sided tunneled catheter presents to the office today for follow-up. Patient reports small scab over the distal aspect of the AV fistula has now healed.  Patient without any issues at this time.  Denies fever or chills.

## 2023-06-29 NOTE — DISCUSSION/SUMMARY
[FreeTextEntry1] : 88 year old male with ESRD s/p left upper extremity radiocephalic  AV fistula currently receiving hemodialysis in the right sided tunneled catheter \par \par Patient scheduled to return back for follow-up in August with duplex of AV fistula at which time decision will be made regarding discontinuation of right-sided tunneled catheter.

## 2023-07-07 NOTE — ED ADULT NURSE NOTE - PRO INTERPRETER NEED 2
Physical Therapy Daily Treatment Note     Name: Fei Phillips   Clinic Number: [id number]    Therapy Diagnosis:   Encounter Diagnoses   Name Primary?    Decreased range of motion of right elbow Yes    Muscle weakness of right upper extremity     Impaired motor control      Physician: Denisha Pardo*    Visit Date: 7/7/2023  Physician Orders: PT Eval and Treat  Medical Diagnosis from Referral: S53.441A (ICD-10-CM) - Tear of ulnar collateral ligament of right elbow, initial encounter  Evaluation Date: 5/22/2023  Authorization Period Expiration: 5/8/2024  Plan of Care Expiration: 06/01/2024  Visit # / Visits authorized: 8 / 20 + Eval    Time In: 0900  Time Out: 0959  Total Billable Time: 59 minutes    Precautions: Standard    DOS: 5/15/2023     PROCEDURES PERFORMED:   1. Right elbow open ulnar collateral ligament repair with internal brace fixation (CPT 09460)  2. Right elbow open ulnar nerve decompression with anterior subcutaneous transposition (CPT 16977)     Phase 1 -Maximum Protection Phase (0-10 days)   Reduce Inflammation   Immobilization in hinged brace at 90 degrees of elbow flexion   Ice and modalities to reduce pain and inflammation   Begin passive, progressing to active wrist and hand range of motion   Begin hand strengthening   Phase 2- Progressive Stretching and Active Range of Motion (10 days to 6 weeks)   Week 2-3 Brace setting  degrees   Active elbow flexion and extension  degrees   Increase Intensity of wrist and hand strengthening   Begin rotator cuff strengthening avoiding valgus stress   Scapular strengthening exercises   Proprioception drills emphasizing neuromuscular control   Week 3-4 Brace setting  degrees   Increase range settings, 5 degrees of extension and 10 degrees of flexion per week progressing to full by week 6   Continue with gradual progression in ROM as outlined in week 2   Week 4-5 Brace setting  degrees   Begin light biceps and triceps  "strengthening   Continue with progressive rotator cuff and scapular strengthening avoiding valgus stress   Week 5-6 Brace setting 5-130 degrees   Phase 3 -Strengthening Phase (Weeks 6-10)   Week 6-8 Discontinue brace   Modalities as needed   Restore full elbow ROM with terminal stretching   Resisted biceps, wrist, and hand strengthening   Proprioception and neuromuscular control drills   Manual resistance and PNF patterns with proximal stabilization   Week 8-10 Continue with terminal stretches   Advance rotator cuff and scapular strengthening program     Subjective     Pt reports: He has had no issues weaning from his brace     He was compliant with home exercise program.    Pain: 0/10  Location: R elbow     Objective     Daily Measurements:     R elbow PROM: 0-130      Daily Treatment       Antonio received therapeutic exercises to develop strength, endurance, ROM, and flexibility for 21 minutes including:  OTB ER At Side 3x12  BTB IR At Side 3x12  BTB Lat Band Walk <-> 40 ft 3x   Eccentric OH Lat Pull down 10# 3x12    Antonio received the following manual therapy techniques:  were applied to for 00 minutes, including:      Antonio participated in dynamic functional therapeutic activities to improve functional performance for 8  minutes, including:  SA Landmine Press  w/Bar 3x12 ea.      Not performed:  Reverse Lunge to step up 12' 2 x 10 per leg with weight vest   8" Anterior SLSD 3x12 ea.   Lateral Sled Push 2 x LOT + 90#   Single Leg Balance on Airex with pitching balance point 2 x 10   Safety Squat Bar 1 x 10 @ 125#, 2 x 10 @ 165#       Antonio participated in neuromuscular re-education activities to improve: Coordination, Kinesthetic, Sense, Proprioception, Posture, and Motor Control for 30 minutes. The following activities were included:  OTB ER 3 x 15   Prone T 3 x 15 x 1#   Prone Y 3 x 15 x 1#   ER Walkouts @ 90/90 OTB 3x12  BTB Lat Band Walks w/8# Ball Press <-> 3x25 ft    NP  Prone 90/90 ER in locked brace 3x12 "   Prone subscap re-ed IR 3x12       Home Exercises and Patient Education Provided     Education provided:   - Post-op precautions   - Updated HEP    Written Home Exercises Provided: yes.  Exercises were reviewed and Antonio was able to demonstrate them prior to the end of the session.  Antonio demonstrated good  understanding of the education provided.     See EMR under patient instructions for exercises given.     Assessment     Antonio with full elbow extension today beginning of session and no notable limitations in shoulder ROM or soft tissue flexibility. Progressed shoulder girdle strengthening with isotonic strengthening at side and isometric holds with step backs for rotator cuff. Intro'd eccentric lat pulls with emphasis on strengthening through the full range. All progressions performed with no adverse response. Updated HEP accordingly. Antonio did ask about adding shoulder exercises at the gym. I instructed him to focus on chest presses, front and lateral raises, and OH pressing with use of machines as opposed to barbell and dumbbells and focus on endurance rather than heavy strengthening. Explicitly instructed to hold from flys.     Antonio Is progressing well towards his goals.     Pt will continue to benefit from skilled outpatient physical therapy to address the deficits listed in the problem list box on initial evaluation, provide pt/family education and to maximize pt's level of independence in the home and community environment. Pt prognosis is Excellent.     Pt's spiritual, cultural and educational needs considered and pt agreeable to plan of care and goals.    Anticipated barriers to physical therapy: None    Goals:  Short Term Goals: 8 weeks  1. Pt will be compliant with HEP 50% of prescribed amount.   2. The pt to demo improvement in R elbow ROM to full and equal to uninvolved side  3.  Pt will tolerate being out of brace and ability to perform all ADL's and self care tasks including dressing, grooming, and  feeding with his right arm.   4. Pt will improve FOTO score to meet or exceed MCID.   5. Pt will demo 4/5 MMT for SA/LT/MT     Long Term Goals: 56 weeks   Pt will be compliant with % of prescribed amount.   Pt will perform functional performance testing within age/gender matched norms to include but not limited to PSET, UE Y-balance, LE Y-balance testing, 110% LSI and 70% ER/IR ratio for shoulder girdle testing with HHD  Pt will complete an UE plyometric program and interval return to throwing program  Pt will complete a return to pitching program pain free and without adverse response.   Pt will complete an interval return to hitting program without adverse response.   The pt will report full participation in ADLs and IADLs without restrictions related to R elbow.     Plan     Outpatient Physical Therapy 2 times weekly for 56 weeks to include the following interventions: Electrical Stimulation IFC/TENS/NMES, Manual Therapy, Moist Heat/ Ice, Neuromuscular Re-ed, Patient Education, Self Care, Therapeutic Activities, and Therapeutic Exercise.     Conrad Vergara, PT , DPT, SCS                   English

## 2023-07-13 ENCOUNTER — APPOINTMENT (OUTPATIENT)
Dept: ENDOVASCULAR SURGERY | Facility: CLINIC | Age: 88
End: 2023-07-13
Payer: MEDICARE

## 2023-07-13 PROCEDURE — 36589 REMOVAL TUNNELED CV CATH: CPT

## 2023-08-31 ENCOUNTER — APPOINTMENT (OUTPATIENT)
Dept: VASCULAR SURGERY | Facility: CLINIC | Age: 88
End: 2023-08-31
Payer: MEDICARE

## 2023-08-31 PROCEDURE — 93990 DOPPLER FLOW TESTING: CPT

## 2023-08-31 PROCEDURE — 99213 OFFICE O/P EST LOW 20 MIN: CPT

## 2023-08-31 NOTE — HISTORY OF PRESENT ILLNESS
[FreeTextEntry1] : 88 year old male with ESRD s/p left upper extremity radiocephalic  AV fistula currently receiving hemodialysis  presents to the office today for follow-up. Patient reports small scab over the distal aspect of the AV fistula has now healed.  Patient without any issues at this time.  Denies fever or chills.

## 2023-08-31 NOTE — PHYSICAL EXAM
[Redness surrounding] : no redness surrounding [Drainage] : no drainage [Thrill] : thrill [Pulsatile Thrill] : no pulsatile thrill [Aneurysm] : no aneurysm [Bleeding] : no bleeding [Hand well perfused] : hand well perfused [Ulcer] : no ulcer [2+] : left 2+ [Normal] : coordination grossly intact, no focal deficits [de-identified] : Intact

## 2023-08-31 NOTE — DISCUSSION/SUMMARY
[FreeTextEntry1] : 88 year old male with ESRD s/p left upper extremity radiocephalic  AV fistula currently receiving hemodialysis in the right sided tunneled catheter   In the office today patient underwent a duplex study which shows a well-functioning fistula.  The patient states they are using the fistula without any difficulty.  He will follow-up in 3 to 4 months with a repeat duplex.

## 2023-10-20 ENCOUNTER — APPOINTMENT (OUTPATIENT)
Dept: ENDOVASCULAR SURGERY | Facility: CLINIC | Age: 88
End: 2023-10-20

## 2023-10-24 ENCOUNTER — APPOINTMENT (OUTPATIENT)
Dept: VASCULAR SURGERY | Facility: CLINIC | Age: 88
End: 2023-10-24
Payer: MEDICARE

## 2023-10-24 PROCEDURE — 93990 DOPPLER FLOW TESTING: CPT

## 2023-10-24 PROCEDURE — 99213 OFFICE O/P EST LOW 20 MIN: CPT

## 2023-10-26 ENCOUNTER — EMERGENCY (EMERGENCY)
Facility: HOSPITAL | Age: 88
LOS: 1 days | Discharge: HOME | End: 2023-10-26
Attending: EMERGENCY MEDICINE | Admitting: EMERGENCY MEDICINE
Payer: MEDICARE

## 2023-10-26 VITALS
TEMPERATURE: 98 F | DIASTOLIC BLOOD PRESSURE: 69 MMHG | OXYGEN SATURATION: 98 % | HEART RATE: 66 BPM | SYSTOLIC BLOOD PRESSURE: 158 MMHG | RESPIRATION RATE: 20 BRPM

## 2023-10-26 VITALS
TEMPERATURE: 98 F | SYSTOLIC BLOOD PRESSURE: 140 MMHG | OXYGEN SATURATION: 99 % | RESPIRATION RATE: 18 BRPM | HEART RATE: 66 BPM | DIASTOLIC BLOOD PRESSURE: 55 MMHG

## 2023-10-26 DIAGNOSIS — I77.0 ARTERIOVENOUS FISTULA, ACQUIRED: Chronic | ICD-10-CM

## 2023-10-26 LAB
ALBUMIN SERPL ELPH-MCNC: 4 G/DL — SIGNIFICANT CHANGE UP (ref 3.3–5)
ALBUMIN SERPL ELPH-MCNC: 4 G/DL — SIGNIFICANT CHANGE UP (ref 3.3–5)
ALP SERPL-CCNC: 92 U/L — SIGNIFICANT CHANGE UP (ref 40–120)
ALP SERPL-CCNC: 92 U/L — SIGNIFICANT CHANGE UP (ref 40–120)
ALT FLD-CCNC: 8 U/L — SIGNIFICANT CHANGE UP (ref 4–41)
ALT FLD-CCNC: 8 U/L — SIGNIFICANT CHANGE UP (ref 4–41)
ANION GAP SERPL CALC-SCNC: 10 MMOL/L — SIGNIFICANT CHANGE UP (ref 7–14)
ANION GAP SERPL CALC-SCNC: 10 MMOL/L — SIGNIFICANT CHANGE UP (ref 7–14)
AST SERPL-CCNC: 17 U/L — SIGNIFICANT CHANGE UP (ref 4–40)
AST SERPL-CCNC: 17 U/L — SIGNIFICANT CHANGE UP (ref 4–40)
B PERT DNA SPEC QL NAA+PROBE: SIGNIFICANT CHANGE UP
B PERT DNA SPEC QL NAA+PROBE: SIGNIFICANT CHANGE UP
B PERT+PARAPERT DNA PNL SPEC NAA+PROBE: SIGNIFICANT CHANGE UP
B PERT+PARAPERT DNA PNL SPEC NAA+PROBE: SIGNIFICANT CHANGE UP
BASE EXCESS BLDV CALC-SCNC: 6.6 MMOL/L — HIGH (ref -2–3)
BASE EXCESS BLDV CALC-SCNC: 6.6 MMOL/L — HIGH (ref -2–3)
BASOPHILS # BLD AUTO: 0.02 K/UL — SIGNIFICANT CHANGE UP (ref 0–0.2)
BASOPHILS # BLD AUTO: 0.02 K/UL — SIGNIFICANT CHANGE UP (ref 0–0.2)
BASOPHILS NFR BLD AUTO: 0.4 % — SIGNIFICANT CHANGE UP (ref 0–2)
BASOPHILS NFR BLD AUTO: 0.4 % — SIGNIFICANT CHANGE UP (ref 0–2)
BILIRUB SERPL-MCNC: 0.3 MG/DL — SIGNIFICANT CHANGE UP (ref 0.2–1.2)
BILIRUB SERPL-MCNC: 0.3 MG/DL — SIGNIFICANT CHANGE UP (ref 0.2–1.2)
BLOOD GAS VENOUS COMPREHENSIVE RESULT: SIGNIFICANT CHANGE UP
BLOOD GAS VENOUS COMPREHENSIVE RESULT: SIGNIFICANT CHANGE UP
BORDETELLA PARAPERTUSSIS (RAPRVP): SIGNIFICANT CHANGE UP
BORDETELLA PARAPERTUSSIS (RAPRVP): SIGNIFICANT CHANGE UP
BUN SERPL-MCNC: 30 MG/DL — HIGH (ref 7–23)
BUN SERPL-MCNC: 30 MG/DL — HIGH (ref 7–23)
C PNEUM DNA SPEC QL NAA+PROBE: SIGNIFICANT CHANGE UP
C PNEUM DNA SPEC QL NAA+PROBE: SIGNIFICANT CHANGE UP
CALCIUM SERPL-MCNC: 9.8 MG/DL — SIGNIFICANT CHANGE UP (ref 8.4–10.5)
CALCIUM SERPL-MCNC: 9.8 MG/DL — SIGNIFICANT CHANGE UP (ref 8.4–10.5)
CHLORIDE BLDV-SCNC: 99 MMOL/L — SIGNIFICANT CHANGE UP (ref 96–108)
CHLORIDE BLDV-SCNC: 99 MMOL/L — SIGNIFICANT CHANGE UP (ref 96–108)
CHLORIDE SERPL-SCNC: 100 MMOL/L — SIGNIFICANT CHANGE UP (ref 98–107)
CHLORIDE SERPL-SCNC: 100 MMOL/L — SIGNIFICANT CHANGE UP (ref 98–107)
CO2 BLDV-SCNC: 35.3 MMOL/L — HIGH (ref 22–26)
CO2 BLDV-SCNC: 35.3 MMOL/L — HIGH (ref 22–26)
CO2 SERPL-SCNC: 30 MMOL/L — SIGNIFICANT CHANGE UP (ref 22–31)
CO2 SERPL-SCNC: 30 MMOL/L — SIGNIFICANT CHANGE UP (ref 22–31)
CREAT SERPL-MCNC: 9.43 MG/DL — HIGH (ref 0.5–1.3)
CREAT SERPL-MCNC: 9.43 MG/DL — HIGH (ref 0.5–1.3)
EGFR: 5 ML/MIN/1.73M2 — LOW
EGFR: 5 ML/MIN/1.73M2 — LOW
EOSINOPHIL # BLD AUTO: 0.4 K/UL — SIGNIFICANT CHANGE UP (ref 0–0.5)
EOSINOPHIL # BLD AUTO: 0.4 K/UL — SIGNIFICANT CHANGE UP (ref 0–0.5)
EOSINOPHIL NFR BLD AUTO: 7.6 % — HIGH (ref 0–6)
EOSINOPHIL NFR BLD AUTO: 7.6 % — HIGH (ref 0–6)
FLUAV SUBTYP SPEC NAA+PROBE: SIGNIFICANT CHANGE UP
FLUAV SUBTYP SPEC NAA+PROBE: SIGNIFICANT CHANGE UP
FLUBV RNA SPEC QL NAA+PROBE: SIGNIFICANT CHANGE UP
FLUBV RNA SPEC QL NAA+PROBE: SIGNIFICANT CHANGE UP
GAS PNL BLDV: 139 MMOL/L — SIGNIFICANT CHANGE UP (ref 136–145)
GAS PNL BLDV: 139 MMOL/L — SIGNIFICANT CHANGE UP (ref 136–145)
GLUCOSE BLDV-MCNC: 87 MG/DL — SIGNIFICANT CHANGE UP (ref 70–99)
GLUCOSE BLDV-MCNC: 87 MG/DL — SIGNIFICANT CHANGE UP (ref 70–99)
GLUCOSE SERPL-MCNC: 92 MG/DL — SIGNIFICANT CHANGE UP (ref 70–99)
GLUCOSE SERPL-MCNC: 92 MG/DL — SIGNIFICANT CHANGE UP (ref 70–99)
HADV DNA SPEC QL NAA+PROBE: SIGNIFICANT CHANGE UP
HADV DNA SPEC QL NAA+PROBE: SIGNIFICANT CHANGE UP
HCO3 BLDV-SCNC: 34 MMOL/L — HIGH (ref 22–29)
HCO3 BLDV-SCNC: 34 MMOL/L — HIGH (ref 22–29)
HCOV 229E RNA SPEC QL NAA+PROBE: SIGNIFICANT CHANGE UP
HCOV 229E RNA SPEC QL NAA+PROBE: SIGNIFICANT CHANGE UP
HCOV HKU1 RNA SPEC QL NAA+PROBE: SIGNIFICANT CHANGE UP
HCOV HKU1 RNA SPEC QL NAA+PROBE: SIGNIFICANT CHANGE UP
HCOV NL63 RNA SPEC QL NAA+PROBE: SIGNIFICANT CHANGE UP
HCOV NL63 RNA SPEC QL NAA+PROBE: SIGNIFICANT CHANGE UP
HCOV OC43 RNA SPEC QL NAA+PROBE: SIGNIFICANT CHANGE UP
HCOV OC43 RNA SPEC QL NAA+PROBE: SIGNIFICANT CHANGE UP
HCT VFR BLD CALC: 40 % — SIGNIFICANT CHANGE UP (ref 39–50)
HCT VFR BLD CALC: 40 % — SIGNIFICANT CHANGE UP (ref 39–50)
HCT VFR BLDA CALC: 36 % — LOW (ref 39–51)
HCT VFR BLDA CALC: 36 % — LOW (ref 39–51)
HGB BLD CALC-MCNC: 12.1 G/DL — LOW (ref 12.6–17.4)
HGB BLD CALC-MCNC: 12.1 G/DL — LOW (ref 12.6–17.4)
HGB BLD-MCNC: 11.8 G/DL — LOW (ref 13–17)
HGB BLD-MCNC: 11.8 G/DL — LOW (ref 13–17)
HMPV RNA SPEC QL NAA+PROBE: SIGNIFICANT CHANGE UP
HMPV RNA SPEC QL NAA+PROBE: SIGNIFICANT CHANGE UP
HPIV1 RNA SPEC QL NAA+PROBE: SIGNIFICANT CHANGE UP
HPIV1 RNA SPEC QL NAA+PROBE: SIGNIFICANT CHANGE UP
HPIV2 RNA SPEC QL NAA+PROBE: SIGNIFICANT CHANGE UP
HPIV2 RNA SPEC QL NAA+PROBE: SIGNIFICANT CHANGE UP
HPIV3 RNA SPEC QL NAA+PROBE: SIGNIFICANT CHANGE UP
HPIV3 RNA SPEC QL NAA+PROBE: SIGNIFICANT CHANGE UP
HPIV4 RNA SPEC QL NAA+PROBE: SIGNIFICANT CHANGE UP
HPIV4 RNA SPEC QL NAA+PROBE: SIGNIFICANT CHANGE UP
IANC: 2.74 K/UL — SIGNIFICANT CHANGE UP (ref 1.8–7.4)
IANC: 2.74 K/UL — SIGNIFICANT CHANGE UP (ref 1.8–7.4)
IMM GRANULOCYTES NFR BLD AUTO: 0.2 % — SIGNIFICANT CHANGE UP (ref 0–0.9)
IMM GRANULOCYTES NFR BLD AUTO: 0.2 % — SIGNIFICANT CHANGE UP (ref 0–0.9)
LACTATE BLDV-MCNC: 1.2 MMOL/L — SIGNIFICANT CHANGE UP (ref 0.5–2)
LACTATE BLDV-MCNC: 1.2 MMOL/L — SIGNIFICANT CHANGE UP (ref 0.5–2)
LYMPHOCYTES # BLD AUTO: 1.36 K/UL — SIGNIFICANT CHANGE UP (ref 1–3.3)
LYMPHOCYTES # BLD AUTO: 1.36 K/UL — SIGNIFICANT CHANGE UP (ref 1–3.3)
LYMPHOCYTES # BLD AUTO: 25.8 % — SIGNIFICANT CHANGE UP (ref 13–44)
LYMPHOCYTES # BLD AUTO: 25.8 % — SIGNIFICANT CHANGE UP (ref 13–44)
M PNEUMO DNA SPEC QL NAA+PROBE: SIGNIFICANT CHANGE UP
M PNEUMO DNA SPEC QL NAA+PROBE: SIGNIFICANT CHANGE UP
MCHC RBC-ENTMCNC: 26 PG — LOW (ref 27–34)
MCHC RBC-ENTMCNC: 26 PG — LOW (ref 27–34)
MCHC RBC-ENTMCNC: 29.5 GM/DL — LOW (ref 32–36)
MCHC RBC-ENTMCNC: 29.5 GM/DL — LOW (ref 32–36)
MCV RBC AUTO: 88.3 FL — SIGNIFICANT CHANGE UP (ref 80–100)
MCV RBC AUTO: 88.3 FL — SIGNIFICANT CHANGE UP (ref 80–100)
MONOCYTES # BLD AUTO: 0.75 K/UL — SIGNIFICANT CHANGE UP (ref 0–0.9)
MONOCYTES # BLD AUTO: 0.75 K/UL — SIGNIFICANT CHANGE UP (ref 0–0.9)
MONOCYTES NFR BLD AUTO: 14.2 % — HIGH (ref 2–14)
MONOCYTES NFR BLD AUTO: 14.2 % — HIGH (ref 2–14)
NEUTROPHILS # BLD AUTO: 2.74 K/UL — SIGNIFICANT CHANGE UP (ref 1.8–7.4)
NEUTROPHILS # BLD AUTO: 2.74 K/UL — SIGNIFICANT CHANGE UP (ref 1.8–7.4)
NEUTROPHILS NFR BLD AUTO: 51.8 % — SIGNIFICANT CHANGE UP (ref 43–77)
NEUTROPHILS NFR BLD AUTO: 51.8 % — SIGNIFICANT CHANGE UP (ref 43–77)
NRBC # BLD: 0 /100 WBCS — SIGNIFICANT CHANGE UP (ref 0–0)
NRBC # BLD: 0 /100 WBCS — SIGNIFICANT CHANGE UP (ref 0–0)
NRBC # FLD: 0 K/UL — SIGNIFICANT CHANGE UP (ref 0–0)
NRBC # FLD: 0 K/UL — SIGNIFICANT CHANGE UP (ref 0–0)
NT-PROBNP SERPL-SCNC: HIGH PG/ML
NT-PROBNP SERPL-SCNC: HIGH PG/ML
PCO2 BLDV: 58 MMHG — HIGH (ref 42–55)
PCO2 BLDV: 58 MMHG — HIGH (ref 42–55)
PH BLDV: 7.37 — SIGNIFICANT CHANGE UP (ref 7.32–7.43)
PH BLDV: 7.37 — SIGNIFICANT CHANGE UP (ref 7.32–7.43)
PLATELET # BLD AUTO: 205 K/UL — SIGNIFICANT CHANGE UP (ref 150–400)
PLATELET # BLD AUTO: 205 K/UL — SIGNIFICANT CHANGE UP (ref 150–400)
PO2 BLDV: 25 MMHG — SIGNIFICANT CHANGE UP (ref 25–45)
PO2 BLDV: 25 MMHG — SIGNIFICANT CHANGE UP (ref 25–45)
POTASSIUM BLDV-SCNC: 5.2 MMOL/L — HIGH (ref 3.5–5.1)
POTASSIUM BLDV-SCNC: 5.2 MMOL/L — HIGH (ref 3.5–5.1)
POTASSIUM SERPL-MCNC: 5 MMOL/L — SIGNIFICANT CHANGE UP (ref 3.5–5.3)
POTASSIUM SERPL-MCNC: 5 MMOL/L — SIGNIFICANT CHANGE UP (ref 3.5–5.3)
POTASSIUM SERPL-SCNC: 5 MMOL/L — SIGNIFICANT CHANGE UP (ref 3.5–5.3)
POTASSIUM SERPL-SCNC: 5 MMOL/L — SIGNIFICANT CHANGE UP (ref 3.5–5.3)
PROT SERPL-MCNC: 7.6 G/DL — SIGNIFICANT CHANGE UP (ref 6–8.3)
PROT SERPL-MCNC: 7.6 G/DL — SIGNIFICANT CHANGE UP (ref 6–8.3)
RAPID RVP RESULT: SIGNIFICANT CHANGE UP
RAPID RVP RESULT: SIGNIFICANT CHANGE UP
RBC # BLD: 4.53 M/UL — SIGNIFICANT CHANGE UP (ref 4.2–5.8)
RBC # BLD: 4.53 M/UL — SIGNIFICANT CHANGE UP (ref 4.2–5.8)
RBC # FLD: 14.9 % — HIGH (ref 10.3–14.5)
RBC # FLD: 14.9 % — HIGH (ref 10.3–14.5)
RSV RNA SPEC QL NAA+PROBE: SIGNIFICANT CHANGE UP
RSV RNA SPEC QL NAA+PROBE: SIGNIFICANT CHANGE UP
RV+EV RNA SPEC QL NAA+PROBE: SIGNIFICANT CHANGE UP
RV+EV RNA SPEC QL NAA+PROBE: SIGNIFICANT CHANGE UP
SAO2 % BLDV: 25.6 % — LOW (ref 67–88)
SAO2 % BLDV: 25.6 % — LOW (ref 67–88)
SARS-COV-2 RNA SPEC QL NAA+PROBE: SIGNIFICANT CHANGE UP
SARS-COV-2 RNA SPEC QL NAA+PROBE: SIGNIFICANT CHANGE UP
SODIUM SERPL-SCNC: 140 MMOL/L — SIGNIFICANT CHANGE UP (ref 135–145)
SODIUM SERPL-SCNC: 140 MMOL/L — SIGNIFICANT CHANGE UP (ref 135–145)
TROPONIN T, HIGH SENSITIVITY RESULT: 70 NG/L — CRITICAL HIGH
TROPONIN T, HIGH SENSITIVITY RESULT: 70 NG/L — CRITICAL HIGH
TROPONIN T, HIGH SENSITIVITY RESULT: 79 NG/L — CRITICAL HIGH
TROPONIN T, HIGH SENSITIVITY RESULT: 79 NG/L — CRITICAL HIGH
WBC # BLD: 5.28 K/UL — SIGNIFICANT CHANGE UP (ref 3.8–10.5)
WBC # BLD: 5.28 K/UL — SIGNIFICANT CHANGE UP (ref 3.8–10.5)
WBC # FLD AUTO: 5.28 K/UL — SIGNIFICANT CHANGE UP (ref 3.8–10.5)
WBC # FLD AUTO: 5.28 K/UL — SIGNIFICANT CHANGE UP (ref 3.8–10.5)

## 2023-10-26 PROCEDURE — 93010 ELECTROCARDIOGRAM REPORT: CPT

## 2023-10-26 PROCEDURE — 99285 EMERGENCY DEPT VISIT HI MDM: CPT

## 2023-10-26 PROCEDURE — 71046 X-RAY EXAM CHEST 2 VIEWS: CPT | Mod: 26

## 2023-10-26 NOTE — ED ADULT TRIAGE NOTE - CHIEF COMPLAINT QUOTE
Pt st" When I lay down at night I cough and I get so tired, I saw a MD on Sunday gave me pills the cough got better but still feel very tired. I went back to [facility] and told to go to ER r/o pneumonia vs CHF "  hx ESRD on Diaylsis. MWF, DM2, Htn

## 2023-10-26 NOTE — ED PROVIDER NOTE - PROGRESS NOTE DETAILS
Osmani Gonzales MD  CXR showed small pleural effusion and atelectasis. Labs nonactionable. Patient reports feeling well. Is laying back in bed comfortably. Patient offered inpatient HD but patient requests to follow up with his scheduled outpatient HD tomorrow. Stable for DC with home with PCP and HD follow up and strict return precautions

## 2023-10-26 NOTE — ED PROVIDER NOTE - SHIFT CHANGE DETAILS
MAR: Patient signed out to Dr. Costa pending reassessment for disposition.  HD stable at time of signout.

## 2023-10-26 NOTE — ED ADULT NURSE NOTE - CHIEF COMPLAINT QUOTE
Pt st" When I lay down at night I cough and I get so tired, I saw a MD on Sunday gave me pills the cough got better but still feel very tired. I went back to [facility] and told to go to ER r/o pneumonia vs CHF "  hx ESRD on Diaylsis. MWF, DM2, Htn

## 2023-10-26 NOTE — ED PROVIDER NOTE - ATTENDING CONTRIBUTION TO CARE
Brief HPI:   88-year-old male past medical history of type 2 diabetes, hypertension, ESRD on HD (Monday Wednesday Friday, last HD 1 day ago) presents with shortness of breath for approximately 5 days.  Patient states he was having cough, dyspnea on exertion, and difficulty lying flat.  During my interview, patient states he is feeling significantly better in terms of his dyspnea.  Denies fever, chills, chest pain, hemoptysis, nausea, vomiting, leg swelling.  Patient is anuric.    Vitals:   Reviewed    Exam:    GEN:  Non-toxic appearing, non-distressed, speaking full sentences, non-diaphoretic, AAOx3  HEENT:  NCAT, neck supple, EOMI, PERRLA, sclera anicteric, no conjunctival pallor or injection, no stridor, normal voice, no tonsillar exudate, uvula midline  CV:  regular rhythm and rate, s1/s2 audible, no murmurs, rubs or gallops, peripheral pulses 2+ and symmetric  PULM:  non-labored respirations, Diminished breath sounds at bilateral lung bases  ABD:  non distended, non-tender, no rebound, no guarding, negative Whatley's sign, bowel sounds normal, no cvat  MSK:  no gross deformity, non-tender extremities and joints, range of motion grossly normal appearing, no extremity edema, extremities warm and well perfused   NEURO:  AAOx3, CN II-XII intact, motor 5/5 in upper and lower extremities bilaterally, sensation grossly intact in extremities and trunk, finger to nose testing wnl, no nystagmus, negative Romberg, no pronator drift, no gait deficit  SKIN:  warm, dry, no rash or vesicles     A/P:   88-year-old male past medical history of type 2 diabetes, hypertension, ESRD on HD (Monday Wednesday Friday, last HD 1 day ago) presents with shortness of breath for approximately 5 days.  Reports significant improvement in symptoms upon arrival to the ED.  Vital signs stable, no hypoxemia.  EKG nonischemic.  Possible hypovolemia given history of ESRD.  No apparent infectious etiology, patient is afebrile.  Will send labs, chest x-ray.  Disposition pending.

## 2023-10-26 NOTE — ED PROVIDER NOTE - NSFOLLOWUPINSTRUCTIONS_ED_ALL_ED_FT
1. You presented to the emergency department for: shortness of breath. You were found to have some fluid in the lungs. There was not found to be an indication for hospital admission. You should follow up with your scheduled dialysis session to help clear the fluid.    2. Your evaluation in the emergency department included a physician evaluation. Your work-up did not reveal any findings indicating the need for admission to the hospital or any emergent interventions at this time.     3. If needed, to arrange an appointment with a primary care provider please call: [phone]    4. Please continue taking your regular medications as prescribed.     5. PLEASE RETURN TO THE EMERGENCY DEPARTMENT IMMEDIATELY IF you develop any fevers not responding to over the counter medications, uncontrollable nausea and vomiting, an inability to tolerate eating and drinking, difficulty breathing, chest pain, a severe increase in your symptoms or pain, or any other new symptoms that concern you.

## 2023-10-26 NOTE — ED PROVIDER NOTE - OBJECTIVE STATEMENT
This is a 88-year-old male past medical history of type 2 diabetes, hypertension, ESRD Monday Wednesday Friday anuric, presenting with shortness of breath. For past 5 or so days patient has reported dyspnea on exertion and orthopnea as well as cough.  Patient presented to  prescribed a medication however he does not know the name of the medication.  His cough has since improved as well as the orthopnea, he does continue to report PARKER.  He last attended his HD session yesterday without issue.  He denies fever, chills, chest pain, abdominal, nausea, vomiting, lower extremity pain or swelling, recent travel.

## 2023-10-26 NOTE — ED ADULT NURSE REASSESSMENT NOTE - NS ED NURSE REASSESS COMMENT FT1
Received report from Day RN Bo Browne: Pt A&Ox4, ambulatory, appears to be resting comfortably, NAD, CM in progress. Awaiting for XR results. No other complaints at this moment, Safety precautions implemented as per protocol, awaiting further MD orders, plan of care ongoing.

## 2023-10-26 NOTE — ED PROVIDER NOTE - PATIENT PORTAL LINK FT
You can access the FollowMyHealth Patient Portal offered by [facility] by registering at the following website: [email or web address]. By joining [facility]’s FollowMyHealth portal, you will also be able to view your health information using other applications (apps) compatible with our system.

## 2023-10-26 NOTE — ED PROVIDER NOTE - PHYSICAL EXAMINATION
GENERAL: well appearing in no acute distress, non-toxic appearing  HEAD: normocephalic, atraumatic  HEENT: normal conjunctiva, oral mucosa moist, neck supple, no JVD  CARDIAC: regular rate and rhythm, normal S1S2, no appreciable murmurs, 2+ pulses in UE/LE b/l  PULM: normal breath sounds, clear to ascultation bilaterally, no rales, rhonchi, wheezing  GI: abdomen nondistended, soft, nontender, no guarding, rebound tenderness  NEURO: no focal motor or sensory deficits, normal speech, PERRLA, EOMI,AAOx3  MSK: no peripheral edema, no calf tenderness b/l  SKIN: well-perfused, extremities warm, no visible rashes  PSYCH: appropriate mood and affect

## 2023-10-26 NOTE — ED PROVIDER NOTE - CLINICAL SUMMARY MEDICAL DECISION MAKING FREE TEXT BOX
88-year-old male past medical history of type 2 diabetes, hypertension, ESRD Monday Wednesday Friday anuric, presenting with shortness of breath. For past 5 or so days patient has reported dyspnea on exertion and orthopnea as well as cough.  Patient presented to  prescribed a medication however he does not know the name of the medication.  His cough has since improved as well as the orthopnea, he does continue to report PARKER.  He last attended his HD session yesterday without issue.  He denies fever, chills, chest pain, abdominal, nausea, vomiting, lower extremity pain or swelling, recent travel.    Patient presents afebrile and hemodynamically stable, saturating 100% on room air.  Patient laying at a 45 degrees with out discomfort.  Lungs clear, normal heart sounds,  soft nontender abdomen, no lower extremity swelling or tenderness.  Patient had previous echo in 2022 that showed EF 50%.  Patient does not appear clinically volume overloaded, has not had recent fever.  Symptoms may be due to a viral symptomology versus pneumonia versus CHF versus volume overload.  Will evaluate with labs including BNP, CXR, RVP and reassess.

## 2023-10-26 NOTE — ED ADULT NURSE NOTE - OBJECTIVE STATEMENT
Pt reports being sent in by his MD to be check for possible PNA or CHF due to orthopnea. Pt also reports cough when lying down, and feeling tired.

## 2023-10-26 NOTE — ED ADULT NURSE NOTE - NSFALLHARMRISKINTERV_ED_ALL_ED

## 2023-11-05 ENCOUNTER — INPATIENT (INPATIENT)
Facility: HOSPITAL | Age: 88
LOS: 2 days | Discharge: HOME | End: 2023-11-08
Attending: STUDENT IN AN ORGANIZED HEALTH CARE EDUCATION/TRAINING PROGRAM | Admitting: STUDENT IN AN ORGANIZED HEALTH CARE EDUCATION/TRAINING PROGRAM
Payer: MEDICARE

## 2023-11-05 VITALS
SYSTOLIC BLOOD PRESSURE: 152 MMHG | DIASTOLIC BLOOD PRESSURE: 64 MMHG | TEMPERATURE: 98 F | RESPIRATION RATE: 18 BRPM | HEART RATE: 69 BPM | OXYGEN SATURATION: 95 %

## 2023-11-05 DIAGNOSIS — E78.5 HYPERLIPIDEMIA, UNSPECIFIED: ICD-10-CM

## 2023-11-05 DIAGNOSIS — J96.01 ACUTE RESPIRATORY FAILURE WITH HYPOXIA: ICD-10-CM

## 2023-11-05 DIAGNOSIS — N18.6 END STAGE RENAL DISEASE: ICD-10-CM

## 2023-11-05 DIAGNOSIS — E87.5 HYPERKALEMIA: ICD-10-CM

## 2023-11-05 DIAGNOSIS — I50.31 ACUTE DIASTOLIC (CONGESTIVE) HEART FAILURE: ICD-10-CM

## 2023-11-05 DIAGNOSIS — I77.0 ARTERIOVENOUS FISTULA, ACQUIRED: Chronic | ICD-10-CM

## 2023-11-05 DIAGNOSIS — Z29.9 ENCOUNTER FOR PROPHYLACTIC MEASURES, UNSPECIFIED: ICD-10-CM

## 2023-11-05 DIAGNOSIS — R94.31 ABNORMAL ELECTROCARDIOGRAM [ECG] [EKG]: ICD-10-CM

## 2023-11-05 DIAGNOSIS — R06.02 SHORTNESS OF BREATH: ICD-10-CM

## 2023-11-05 DIAGNOSIS — E11.9 TYPE 2 DIABETES MELLITUS WITHOUT COMPLICATIONS: ICD-10-CM

## 2023-11-05 DIAGNOSIS — I10 ESSENTIAL (PRIMARY) HYPERTENSION: ICD-10-CM

## 2023-11-05 LAB
ALBUMIN SERPL ELPH-MCNC: 4.1 G/DL — SIGNIFICANT CHANGE UP (ref 3.3–5)
ALBUMIN SERPL ELPH-MCNC: 4.1 G/DL — SIGNIFICANT CHANGE UP (ref 3.3–5)
ALP SERPL-CCNC: 86 U/L — SIGNIFICANT CHANGE UP (ref 40–120)
ALP SERPL-CCNC: 86 U/L — SIGNIFICANT CHANGE UP (ref 40–120)
ALT FLD-CCNC: 10 U/L — SIGNIFICANT CHANGE UP (ref 4–41)
ALT FLD-CCNC: 10 U/L — SIGNIFICANT CHANGE UP (ref 4–41)
ANION GAP SERPL CALC-SCNC: 14 MMOL/L — SIGNIFICANT CHANGE UP (ref 7–14)
ANION GAP SERPL CALC-SCNC: 14 MMOL/L — SIGNIFICANT CHANGE UP (ref 7–14)
AST SERPL-CCNC: 22 U/L — SIGNIFICANT CHANGE UP (ref 4–40)
AST SERPL-CCNC: 22 U/L — SIGNIFICANT CHANGE UP (ref 4–40)
BASE EXCESS BLDV CALC-SCNC: 5.7 MMOL/L — HIGH (ref -2–3)
BASE EXCESS BLDV CALC-SCNC: 5.7 MMOL/L — HIGH (ref -2–3)
BASOPHILS # BLD AUTO: 0.02 K/UL — SIGNIFICANT CHANGE UP (ref 0–0.2)
BASOPHILS # BLD AUTO: 0.02 K/UL — SIGNIFICANT CHANGE UP (ref 0–0.2)
BASOPHILS NFR BLD AUTO: 0.3 % — SIGNIFICANT CHANGE UP (ref 0–2)
BASOPHILS NFR BLD AUTO: 0.3 % — SIGNIFICANT CHANGE UP (ref 0–2)
BILIRUB SERPL-MCNC: 0.4 MG/DL — SIGNIFICANT CHANGE UP (ref 0.2–1.2)
BILIRUB SERPL-MCNC: 0.4 MG/DL — SIGNIFICANT CHANGE UP (ref 0.2–1.2)
BLOOD GAS VENOUS COMPREHENSIVE RESULT: SIGNIFICANT CHANGE UP
BLOOD GAS VENOUS COMPREHENSIVE RESULT: SIGNIFICANT CHANGE UP
BUN SERPL-MCNC: 43 MG/DL — HIGH (ref 7–23)
BUN SERPL-MCNC: 43 MG/DL — HIGH (ref 7–23)
CALCIUM SERPL-MCNC: 9.9 MG/DL — SIGNIFICANT CHANGE UP (ref 8.4–10.5)
CALCIUM SERPL-MCNC: 9.9 MG/DL — SIGNIFICANT CHANGE UP (ref 8.4–10.5)
CHLORIDE BLDV-SCNC: 104 MMOL/L — SIGNIFICANT CHANGE UP (ref 96–108)
CHLORIDE BLDV-SCNC: 104 MMOL/L — SIGNIFICANT CHANGE UP (ref 96–108)
CHLORIDE SERPL-SCNC: 102 MMOL/L — SIGNIFICANT CHANGE UP (ref 98–107)
CHLORIDE SERPL-SCNC: 102 MMOL/L — SIGNIFICANT CHANGE UP (ref 98–107)
CO2 BLDV-SCNC: 34.1 MMOL/L — HIGH (ref 22–26)
CO2 BLDV-SCNC: 34.1 MMOL/L — HIGH (ref 22–26)
CO2 SERPL-SCNC: 27 MMOL/L — SIGNIFICANT CHANGE UP (ref 22–31)
CO2 SERPL-SCNC: 27 MMOL/L — SIGNIFICANT CHANGE UP (ref 22–31)
CREAT SERPL-MCNC: 10.94 MG/DL — HIGH (ref 0.5–1.3)
CREAT SERPL-MCNC: 10.94 MG/DL — HIGH (ref 0.5–1.3)
EGFR: 4 ML/MIN/1.73M2 — LOW
EGFR: 4 ML/MIN/1.73M2 — LOW
EOSINOPHIL # BLD AUTO: 0.29 K/UL — SIGNIFICANT CHANGE UP (ref 0–0.5)
EOSINOPHIL # BLD AUTO: 0.29 K/UL — SIGNIFICANT CHANGE UP (ref 0–0.5)
EOSINOPHIL NFR BLD AUTO: 4.9 % — SIGNIFICANT CHANGE UP (ref 0–6)
EOSINOPHIL NFR BLD AUTO: 4.9 % — SIGNIFICANT CHANGE UP (ref 0–6)
GAS PNL BLDV: 138 MMOL/L — SIGNIFICANT CHANGE UP (ref 136–145)
GAS PNL BLDV: 138 MMOL/L — SIGNIFICANT CHANGE UP (ref 136–145)
GAS PNL BLDV: SIGNIFICANT CHANGE UP
GAS PNL BLDV: SIGNIFICANT CHANGE UP
GLUCOSE BLDC GLUCOMTR-MCNC: 81 MG/DL — SIGNIFICANT CHANGE UP (ref 70–99)
GLUCOSE BLDC GLUCOMTR-MCNC: 81 MG/DL — SIGNIFICANT CHANGE UP (ref 70–99)
GLUCOSE BLDC GLUCOMTR-MCNC: 91 MG/DL — SIGNIFICANT CHANGE UP (ref 70–99)
GLUCOSE BLDC GLUCOMTR-MCNC: 91 MG/DL — SIGNIFICANT CHANGE UP (ref 70–99)
GLUCOSE BLDC GLUCOMTR-MCNC: 92 MG/DL — SIGNIFICANT CHANGE UP (ref 70–99)
GLUCOSE BLDC GLUCOMTR-MCNC: 92 MG/DL — SIGNIFICANT CHANGE UP (ref 70–99)
GLUCOSE BLDV-MCNC: 106 MG/DL — HIGH (ref 70–99)
GLUCOSE BLDV-MCNC: 106 MG/DL — HIGH (ref 70–99)
GLUCOSE SERPL-MCNC: 107 MG/DL — HIGH (ref 70–99)
GLUCOSE SERPL-MCNC: 107 MG/DL — HIGH (ref 70–99)
HCO3 BLDV-SCNC: 32 MMOL/L — HIGH (ref 22–29)
HCO3 BLDV-SCNC: 32 MMOL/L — HIGH (ref 22–29)
HCT VFR BLD CALC: 39.2 % — SIGNIFICANT CHANGE UP (ref 39–50)
HCT VFR BLD CALC: 39.2 % — SIGNIFICANT CHANGE UP (ref 39–50)
HCT VFR BLDA CALC: 37 % — LOW (ref 39–51)
HCT VFR BLDA CALC: 37 % — LOW (ref 39–51)
HGB BLD CALC-MCNC: 12.2 G/DL — LOW (ref 12.6–17.4)
HGB BLD CALC-MCNC: 12.2 G/DL — LOW (ref 12.6–17.4)
HGB BLD-MCNC: 11.8 G/DL — LOW (ref 13–17)
HGB BLD-MCNC: 11.8 G/DL — LOW (ref 13–17)
IANC: 3.76 K/UL — SIGNIFICANT CHANGE UP (ref 1.8–7.4)
IANC: 3.76 K/UL — SIGNIFICANT CHANGE UP (ref 1.8–7.4)
IMM GRANULOCYTES NFR BLD AUTO: 0.3 % — SIGNIFICANT CHANGE UP (ref 0–0.9)
IMM GRANULOCYTES NFR BLD AUTO: 0.3 % — SIGNIFICANT CHANGE UP (ref 0–0.9)
LACTATE BLDV-MCNC: 1.4 MMOL/L — SIGNIFICANT CHANGE UP (ref 0.5–2)
LACTATE BLDV-MCNC: 1.4 MMOL/L — SIGNIFICANT CHANGE UP (ref 0.5–2)
LYMPHOCYTES # BLD AUTO: 1.23 K/UL — SIGNIFICANT CHANGE UP (ref 1–3.3)
LYMPHOCYTES # BLD AUTO: 1.23 K/UL — SIGNIFICANT CHANGE UP (ref 1–3.3)
LYMPHOCYTES # BLD AUTO: 20.8 % — SIGNIFICANT CHANGE UP (ref 13–44)
LYMPHOCYTES # BLD AUTO: 20.8 % — SIGNIFICANT CHANGE UP (ref 13–44)
MCHC RBC-ENTMCNC: 26.3 PG — LOW (ref 27–34)
MCHC RBC-ENTMCNC: 26.3 PG — LOW (ref 27–34)
MCHC RBC-ENTMCNC: 30.1 GM/DL — LOW (ref 32–36)
MCHC RBC-ENTMCNC: 30.1 GM/DL — LOW (ref 32–36)
MCV RBC AUTO: 87.5 FL — SIGNIFICANT CHANGE UP (ref 80–100)
MCV RBC AUTO: 87.5 FL — SIGNIFICANT CHANGE UP (ref 80–100)
MONOCYTES # BLD AUTO: 0.59 K/UL — SIGNIFICANT CHANGE UP (ref 0–0.9)
MONOCYTES # BLD AUTO: 0.59 K/UL — SIGNIFICANT CHANGE UP (ref 0–0.9)
MONOCYTES NFR BLD AUTO: 10 % — SIGNIFICANT CHANGE UP (ref 2–14)
MONOCYTES NFR BLD AUTO: 10 % — SIGNIFICANT CHANGE UP (ref 2–14)
NEUTROPHILS # BLD AUTO: 3.76 K/UL — SIGNIFICANT CHANGE UP (ref 1.8–7.4)
NEUTROPHILS # BLD AUTO: 3.76 K/UL — SIGNIFICANT CHANGE UP (ref 1.8–7.4)
NEUTROPHILS NFR BLD AUTO: 63.7 % — SIGNIFICANT CHANGE UP (ref 43–77)
NEUTROPHILS NFR BLD AUTO: 63.7 % — SIGNIFICANT CHANGE UP (ref 43–77)
NRBC # BLD: 0 /100 WBCS — SIGNIFICANT CHANGE UP (ref 0–0)
NRBC # BLD: 0 /100 WBCS — SIGNIFICANT CHANGE UP (ref 0–0)
NRBC # FLD: 0 K/UL — SIGNIFICANT CHANGE UP (ref 0–0)
NRBC # FLD: 0 K/UL — SIGNIFICANT CHANGE UP (ref 0–0)
NT-PROBNP SERPL-SCNC: SIGNIFICANT CHANGE UP PG/ML
NT-PROBNP SERPL-SCNC: SIGNIFICANT CHANGE UP PG/ML
PCO2 BLDV: 56 MMHG — HIGH (ref 42–55)
PCO2 BLDV: 56 MMHG — HIGH (ref 42–55)
PH BLDV: 7.37 — SIGNIFICANT CHANGE UP (ref 7.32–7.43)
PH BLDV: 7.37 — SIGNIFICANT CHANGE UP (ref 7.32–7.43)
PLATELET # BLD AUTO: 142 K/UL — LOW (ref 150–400)
PLATELET # BLD AUTO: 142 K/UL — LOW (ref 150–400)
PO2 BLDV: 30 MMHG — SIGNIFICANT CHANGE UP (ref 25–45)
PO2 BLDV: 30 MMHG — SIGNIFICANT CHANGE UP (ref 25–45)
POTASSIUM BLDV-SCNC: 5.4 MMOL/L — HIGH (ref 3.5–5.1)
POTASSIUM BLDV-SCNC: 5.4 MMOL/L — HIGH (ref 3.5–5.1)
POTASSIUM SERPL-MCNC: 5.7 MMOL/L — HIGH (ref 3.5–5.3)
POTASSIUM SERPL-MCNC: 5.7 MMOL/L — HIGH (ref 3.5–5.3)
POTASSIUM SERPL-SCNC: 5.7 MMOL/L — HIGH (ref 3.5–5.3)
POTASSIUM SERPL-SCNC: 5.7 MMOL/L — HIGH (ref 3.5–5.3)
PROT SERPL-MCNC: 7.4 G/DL — SIGNIFICANT CHANGE UP (ref 6–8.3)
PROT SERPL-MCNC: 7.4 G/DL — SIGNIFICANT CHANGE UP (ref 6–8.3)
RBC # BLD: 4.48 M/UL — SIGNIFICANT CHANGE UP (ref 4.2–5.8)
RBC # BLD: 4.48 M/UL — SIGNIFICANT CHANGE UP (ref 4.2–5.8)
RBC # FLD: 15.9 % — HIGH (ref 10.3–14.5)
RBC # FLD: 15.9 % — HIGH (ref 10.3–14.5)
SAO2 % BLDV: 46 % — LOW (ref 67–88)
SAO2 % BLDV: 46 % — LOW (ref 67–88)
SODIUM SERPL-SCNC: 143 MMOL/L — SIGNIFICANT CHANGE UP (ref 135–145)
SODIUM SERPL-SCNC: 143 MMOL/L — SIGNIFICANT CHANGE UP (ref 135–145)
TROPONIN T, HIGH SENSITIVITY RESULT: 69 NG/L — CRITICAL HIGH
TROPONIN T, HIGH SENSITIVITY RESULT: 69 NG/L — CRITICAL HIGH
WBC # BLD: 5.91 K/UL — SIGNIFICANT CHANGE UP (ref 3.8–10.5)
WBC # BLD: 5.91 K/UL — SIGNIFICANT CHANGE UP (ref 3.8–10.5)
WBC # FLD AUTO: 5.91 K/UL — SIGNIFICANT CHANGE UP (ref 3.8–10.5)
WBC # FLD AUTO: 5.91 K/UL — SIGNIFICANT CHANGE UP (ref 3.8–10.5)

## 2023-11-05 PROCEDURE — 99285 EMERGENCY DEPT VISIT HI MDM: CPT

## 2023-11-05 PROCEDURE — 71046 X-RAY EXAM CHEST 2 VIEWS: CPT | Mod: 26

## 2023-11-05 PROCEDURE — 99223 1ST HOSP IP/OBS HIGH 75: CPT

## 2023-11-05 RX ORDER — SODIUM CHLORIDE 9 MG/ML
1000 INJECTION, SOLUTION INTRAVENOUS
Refills: 0 | Status: DISCONTINUED | OUTPATIENT
Start: 2023-11-05 | End: 2023-11-08

## 2023-11-05 RX ORDER — GLUCAGON INJECTION, SOLUTION 0.5 MG/.1ML
1 INJECTION, SOLUTION SUBCUTANEOUS ONCE
Refills: 0 | Status: DISCONTINUED | OUTPATIENT
Start: 2023-11-05 | End: 2023-11-08

## 2023-11-05 RX ORDER — SIMVASTATIN 20 MG/1
1 TABLET, FILM COATED ORAL
Qty: 0 | Refills: 0 | DISCHARGE

## 2023-11-05 RX ORDER — SITAGLIPTIN 50 MG/1
1 TABLET, FILM COATED ORAL
Refills: 0 | DISCHARGE

## 2023-11-05 RX ORDER — DEXTROSE 50 % IN WATER 50 %
12.5 SYRINGE (ML) INTRAVENOUS ONCE
Refills: 0 | Status: DISCONTINUED | OUTPATIENT
Start: 2023-11-05 | End: 2023-11-08

## 2023-11-05 RX ORDER — DEXTROSE 50 % IN WATER 50 %
15 SYRINGE (ML) INTRAVENOUS ONCE
Refills: 0 | Status: DISCONTINUED | OUTPATIENT
Start: 2023-11-05 | End: 2023-11-08

## 2023-11-05 RX ORDER — POLYETHYLENE GLYCOL 3350 17 G/17G
17 POWDER, FOR SOLUTION ORAL
Qty: 0 | Refills: 0 | DISCHARGE

## 2023-11-05 RX ORDER — CALCIUM GLUCONATE 100 MG/ML
1 VIAL (ML) INTRAVENOUS ONCE
Refills: 0 | Status: COMPLETED | OUTPATIENT
Start: 2023-11-05 | End: 2023-11-05

## 2023-11-05 RX ORDER — FOLIC ACID 0.8 MG
1 TABLET ORAL
Qty: 0 | Refills: 0 | DISCHARGE

## 2023-11-05 RX ORDER — ATENOLOL 25 MG/1
50 TABLET ORAL DAILY
Refills: 0 | Status: DISCONTINUED | OUTPATIENT
Start: 2023-11-06 | End: 2023-11-08

## 2023-11-05 RX ORDER — SIMVASTATIN 20 MG/1
20 TABLET, FILM COATED ORAL AT BEDTIME
Refills: 0 | Status: DISCONTINUED | OUTPATIENT
Start: 2023-11-05 | End: 2023-11-08

## 2023-11-05 RX ORDER — SENNA PLUS 8.6 MG/1
1 TABLET ORAL
Qty: 0 | Refills: 0 | DISCHARGE

## 2023-11-05 RX ORDER — CHLORHEXIDINE GLUCONATE 213 G/1000ML
1 SOLUTION TOPICAL DAILY
Refills: 0 | Status: DISCONTINUED | OUTPATIENT
Start: 2023-11-05 | End: 2023-11-08

## 2023-11-05 RX ORDER — SODIUM ZIRCONIUM CYCLOSILICATE 10 G/10G
10 POWDER, FOR SUSPENSION ORAL ONCE
Refills: 0 | Status: COMPLETED | OUTPATIENT
Start: 2023-11-05 | End: 2023-11-05

## 2023-11-05 RX ORDER — INSULIN LISPRO 100/ML
VIAL (ML) SUBCUTANEOUS AT BEDTIME
Refills: 0 | Status: DISCONTINUED | OUTPATIENT
Start: 2023-11-05 | End: 2023-11-08

## 2023-11-05 RX ORDER — CALCIUM ACETATE 667 MG
667 TABLET ORAL
Refills: 0 | Status: DISCONTINUED | OUTPATIENT
Start: 2023-11-05 | End: 2023-11-08

## 2023-11-05 RX ORDER — INSULIN LISPRO 100/ML
VIAL (ML) SUBCUTANEOUS
Refills: 0 | Status: DISCONTINUED | OUTPATIENT
Start: 2023-11-05 | End: 2023-11-08

## 2023-11-05 RX ORDER — AMLODIPINE BESYLATE 2.5 MG/1
10 TABLET ORAL DAILY
Refills: 0 | Status: DISCONTINUED | OUTPATIENT
Start: 2023-11-06 | End: 2023-11-08

## 2023-11-05 RX ORDER — SENNA PLUS 8.6 MG/1
1 TABLET ORAL AT BEDTIME
Refills: 0 | Status: DISCONTINUED | OUTPATIENT
Start: 2023-11-05 | End: 2023-11-08

## 2023-11-05 RX ORDER — DEXTROSE 50 % IN WATER 50 %
25 SYRINGE (ML) INTRAVENOUS ONCE
Refills: 0 | Status: DISCONTINUED | OUTPATIENT
Start: 2023-11-05 | End: 2023-11-08

## 2023-11-05 RX ORDER — ATENOLOL 25 MG/1
1 TABLET ORAL
Qty: 0 | Refills: 0 | DISCHARGE

## 2023-11-05 RX ORDER — SODIUM CHLORIDE 9 MG/ML
100 INJECTION INTRAMUSCULAR; INTRAVENOUS; SUBCUTANEOUS
Refills: 0 | Status: DISCONTINUED | OUTPATIENT
Start: 2023-11-05 | End: 2023-11-08

## 2023-11-05 RX ADMIN — SODIUM ZIRCONIUM CYCLOSILICATE 10 GRAM(S): 10 POWDER, FOR SUSPENSION ORAL at 15:47

## 2023-11-05 RX ADMIN — Medication 100 GRAM(S): at 15:46

## 2023-11-05 RX ADMIN — Medication 100 MILLIGRAM(S): at 23:26

## 2023-11-05 RX ADMIN — SIMVASTATIN 20 MILLIGRAM(S): 20 TABLET, FILM COATED ORAL at 22:23

## 2023-11-05 NOTE — CONSULT NOTE ADULT - PROBLEM SELECTOR RECOMMENDATION 9
Pt with ESRD on HD. Pt receives HD TIW on MWF schedule via LUE AVF at [facility] under the supervision of Dr. Abreu. Pt is on dialysis due to long-standing HTN and DM2. Does not make urine. Last HD was on 11/3/23. Pt normally gets 2-2.5L of UF. Pt clinically stable although very fluid overloaded on exam. Labs reviewed. Plan for HD today once pt is admitted. HD consent obtained and placed in the chart. Recommend evaluation of AVF due to moderate thrill and mild scabbing. Monitor BP and labs.

## 2023-11-05 NOTE — H&P ADULT - HISTORY OF PRESENT ILLNESS
88 -year-old male with NIDDM2, HTN, HLD, ESRD on HD MWF via LUE forearm AVF (last HD Friday, completed session), presenting from home with 1.5weeks of dry cough, shortness of breath, orthopnea. He reports some occasional wheezing and some anterior chest pain that is associated only with coughing. He notes possibly drinking too much fluids of recent. He denies any history of VTE. He notes recent travel, drove to [place] approximately 1 month ago, denies any hormone use. He denies any fevers or chills.     In the ED VS: 97.8  68-97  149-168/64-77  18-22  %RA, received calcium gluconate 1g IVPB x1, sodium zirconium cyclosilicate 10g PO x1 88 -year-old male with NIDDM2, HTN, HLD, ESRD on HD MWF via LUE forearm AVF (last HD Friday, completed session), presenting from home with 1.5weeks of dry cough, shortness of breath, orthopnea. He reports some occasional wheezing and some anterior chest pain that is associated only with coughing. He was prescribed benzonatate on 10/22 without relief.  He notes possibly drinking too much fluids of recent. He denies any history of VTE. He notes recent travel, drove to [place] approximately 1 month ago, denies any hormone use. He denies any fevers or chills. No recent sick contacts     In the ED VS: 97.8  68-97  149-168/64-77  18-22  %RA, received calcium gluconate 1g IVPB x1, sodium zirconium cyclosilicate 10g PO x1

## 2023-11-05 NOTE — ED PROVIDER NOTE - CARE PLAN
1 Principal Discharge DX:	Acute diastolic heart failure  Secondary Diagnosis:	ESRD (end stage renal disease)

## 2023-11-05 NOTE — H&P ADULT - NSHPREVIEWOFSYSTEMS_GEN_ALL_CORE
REVIEW OF SYSTEMS:    CONSTITUTIONAL: No weakness, fevers or chills  EYES/ENT: No visual changes; No dysphagia; No sore throat; (+) intermittent rhinorrhea; No sinus pain/pressure  NECK: No pain or stiffness  RESPIRATORY: (+) cough, wheezing; No hemoptysis; (+) shortness of breath  CARDIOVASCULAR: No chest pain or palpitations; No lower extremity edema; (+) orthopnea   GASTROINTESTINAL: No abdominal or epigastric pain. No nausea, vomiting, or hematemesis; No diarrhea or constipation. No melena or hematochezia.  GENITOURINARY: Anuric   NEUROLOGICAL: No numbness, paresthesias, or weakness; No HA; No LH/dizziness  MSK: ambulates without aid; No falls  SKIN: No itching, burning, rashes, or lesions   All other review of systems is negative unless indicated above. REVIEW OF SYSTEMS:    CONSTITUTIONAL: No weakness, fevers or chills  EYES/ENT: No visual changes; No dysphagia; No sore throat; (+) intermittent rhinorrhea; No sinus pain/pressure  NECK: No pain or stiffness  RESPIRATORY: (+) cough, wheezing; No hemoptysis; (+) shortness of breath  CARDIOVASCULAR: No chest pain or palpitations; No lower extremity edema; (+) orthopnea   GASTROINTESTINAL: No abdominal or epigastric pain. No nausea, vomiting, or hematemesis; No diarrhea or constipation. No melena or hematochezia.  GENITOURINARY: Anuric   NEUROLOGICAL: No numbness, paresthesias, or weakness; No HA; No LH/dizziness  MSK: ambulates without aid; No falls  SKIN: No itching, burning, rashes; (+)scar overlying inferior aspect of fistula that reportedly falls off on its own intermittently   All other review of systems is negative unless indicated above.

## 2023-11-05 NOTE — H&P ADULT - NSHPLABSRESULTS_GEN_ALL_CORE
11.8   5.91  )-----------( 142      ( 05 Nov 2023 10:30 )             39.2     143  |  102  |  43<H>  ----------------------------<  107<H>     11-05  5.7<H>   |  27  |  10.94<H>    Ca    9.9      05 Nov 2023 10:30    TPro  7.4  /  Alb  4.1  /  TBili  0.4  /  DBili  x   /  AST  22  /  ALT  10  /  AlkPhos  86  11-05    10:25 - VBG - pH: 7.37  | pCO2: 56    | pO2: 30    | Lactate: 1.4      hs Troponin, T - 69 ng/L (11-05-23 @ 10:30)    Pro-BNP: >50445 (11-05-23 @ 10:30)    < from: Xray Chest 2 Views PA/Lat (11.05.23 @ 11:16) >  The heart is normal in size. Diffuse interstitial markings, similar compared to prior. There is no pneumothorax or pleural effusion. No acute bony abnormality.   IMPRESSION: Diffuse interstitial markings similar to compared to prior likely representing chronic pulmonary vascular changes, however superimposed acute pulmonary interstitial venous congestion cannot be excluded.  < end of copied text >    EKG personally reviewed and interpreted - NSR 72bpm, upright T in aVR, TWI in I, aVL, biphasic T in II, III, aVF, V6, QTc 492ms (aside from increased QT, grossly similar to prior) 11.8   5.91  )-----------( 142      ( 05 Nov 2023 10:30 )             39.2     143  |  102  |  43<H>  ----------------------------<  107<H>     11-05  5.7<H>   |  27  |  10.94<H>    Ca    9.9      05 Nov 2023 10:30    TPro  7.4  /  Alb  4.1  /  TBili  0.4  /  DBili  x   /  AST  22  /  ALT  10  /  AlkPhos  86  11-05    10:25 - VBG - pH: 7.37  | pCO2: 56    | pO2: 30    | Lactate: 1.4      hs Troponin, T - 69 ng/L (11-05-23 @ 10:30)    Pro-BNP: >26101 (11-05-23 @ 10:30)    < from: Xray Chest 2 Views PA/Lat (11.05.23 @ 11:16) >  The heart is normal in size. Diffuse interstitial markings, similar compared to prior. There is no pneumothorax or pleural effusion. No acute bony abnormality.   IMPRESSION: Diffuse interstitial markings similar to compared to prior likely representing chronic pulmonary vascular changes, however superimposed acute pulmonary interstitial venous congestion cannot be excluded.  < end of copied text >    EKG personally reviewed and interpreted - NSR 72bpm, upright T in aVR, TWI in I, aVL, biphasic T in II, III, aVF, V6, QTc 492ms (aside from increased QT, and isolated new Q in II, grossly similar to prior)

## 2023-11-05 NOTE — ED ADULT NURSE REASSESSMENT NOTE - NS ED NURSE REASSESS COMMENT FT1
pt remains alert, oriented x3. denies c/o ch pains. reports upon coughing breathing becomes more difficult. awaiting admission, dialysis. will continue to monitor. nasal carlos remains in use.

## 2023-11-05 NOTE — ED PROVIDER NOTE - CLINICAL SUMMARY MEDICAL DECISION MAKING FREE TEXT BOX
Layla: Dialysis (last 2 days ago). Dc'd a few days ago for volume overload. Returns SOB, cough. Concern for volume overload. Check CBC, CMP, BNP, trop, EKG, CXR. Doesn't make urine. Admit for dialysis. Layla: Dialysis (last 2 days ago). Dc'd a few days ago for volume overload. Returns SOB, cough. Concern for volume overload. Check CBC, CMP, BNP, trop, EKG, CXR. Doesn't make urine. Admit for dialysis.      Patient presents emergency department complaining of cough, shortness of breath, chest pain.  Patient is hemodynamically stable afebrile on presentation.  Patient physical exam significant for Rales in the bilateral lower lung fields.  Patient does not have swelling of the lower extremities.  Patient has bilateral pleural effusions on ultrasound.  We will obtain CBC, CMP, chest x-ray, proBNP to evaluate for any acute pathology.  EKG and troponin were also obtained.  Patient likely be admitted for dialysis and removal of fluid.  Patient is agreeable with plan.

## 2023-11-05 NOTE — ED ADULT NURSE NOTE - COVID-19 RESULT
Patient : Yon Mccann Age: 44 year old Sex: male   MRN: [id number] Encounter Date: 4/24/2021      History     Chief Complaint   Patient presents with   • Chest Pain Adult     HPI  9:15 PM Yon Mccann is a 44 year old male who presents to the ED for evaluation of chest pain and headache which began today at 12:00. The patient began feeling off this afternoon and states his pain is brought on with exertion and deep breathing. He is experiencing a left sided HA radiating to the right side. The patient has been taking Oxybutynin and states he has also recently been feeling drowsy, dizzy, and issues with his balance. The patient denies fevers, cough, sore throat, loss of taste or smell, diarrhea, nausea, vomiting, congestion, runny nose, constipation, or any other associated sx. There are no further modifying factors at this time.      PCP: Verify Pcp      No Known Allergies    Discharge Medication List as of 4/25/2021 12:47 AM      Prior to Admission Medications    Details   oxybutynin (DITROPAN-XL) 10 MG 24 hr tablet Take 1 tablet by mouth daily.Eprescribe, Disp-90 tablet, R-3             No past medical history on file.    No past surgical history on file.    No family history on file.    Social History     Tobacco Use   • Smoking status: Never Smoker   • Smokeless tobacco: Never Used   Substance Use Topics   • Alcohol use: Not Currently   • Drug use: Not Currently       E-cigarette/Vaping     E-Cigarette/Vaping Substances & Devices       Review of Systems   Constitutional: Positive for fatigue. Negative for chills.   HENT: Negative for congestion and sore throat.    Respiratory: Negative for cough, chest tightness, shortness of breath and wheezing.    Cardiovascular: Positive for chest pain.   Gastrointestinal: Negative for abdominal pain, diarrhea, nausea and vomiting.   Genitourinary: Negative for difficulty urinating, dysuria, flank pain and hematuria.   Musculoskeletal: Negative for back pain  and neck pain.   Allergic/Immunologic: Negative for food allergies.   Neurological: Positive for dizziness, weakness and headaches.        Positive for difficulty balancing   Psychiatric/Behavioral: Negative for confusion and suicidal ideas.   All other systems reviewed and are negative.      Physical Exam     ED Triage Vitals [04/24/21 2101]   ED Triage Vitals Group      Temp 97.6 °F (36.4 °C)      Heart Rate 80      Resp 18      /76      SpO2 98 %      EtCO2 mmHg       Height       Weight       Weight Scale Used       BMI (Calculated)       IBW/kg (Calculated)        Physical Exam   Constitutional: He is oriented to person, place, and time. He appears well-developed and well-nourished. No distress.   HENT:   Head: Normocephalic and atraumatic.   Mouth/Throat: Oropharynx is clear and moist.   Eyes: Pupils are equal, round, and reactive to light. EOM are normal. Right eye exhibits no discharge. Left eye exhibits no discharge.   Neck: No JVD present. No tracheal deviation present.   Cardiovascular: Normal rate, normal heart sounds and intact distal pulses.   No murmur heard.  Pulmonary/Chest: Effort normal and breath sounds normal. No respiratory distress.   Abdominal: Bowel sounds are normal. He exhibits no distension. There is no abdominal tenderness. There is no rebound and no guarding.   Musculoskeletal:         General: Normal range of motion.      Cervical back: Normal range of motion.   Neurological: He is alert and oriented to person, place, and time. No cranial nerve deficit. GCS eye subscore is 4. GCS verbal subscore is 5. GCS motor subscore is 6.   No finger to nose dysmetria.  No pronator drift.  Normal sensation throughout all extremities.   Normal strength throughout all extremities.   No aphasia, no dysarthria.   Visual fields intact to confrontation.     Skin: Skin is warm and dry. No rash noted. No erythema.   Psychiatric: He has a normal mood and affect. His behavior is normal. Thought content  normal.   Nursing note and vitals reviewed.      ED Course     Procedures    Lab Results     Results for orders placed or performed during the hospital encounter of 04/24/21   Comprehensive Metabolic Panel   Result Value Ref Range    Fasting Status      Sodium 141 135 - 145 mmol/L    Potassium 3.8 3.4 - 5.1 mmol/L    Chloride 105 98 - 107 mmol/L    Carbon Dioxide 32 21 - 32 mmol/L    Anion Gap 8 (L) 10 - 20 mmol/L    Glucose 103 (H) 65 - 99 mg/dL    BUN 17 6 - 20 mg/dL    Creatinine 1.00 0.67 - 1.17 mg/dL    Glomerular Filtration Rate >90 >90 mL/min/1.73m2    BUN/ Creatinine Ratio 17 7 - 25    Calcium 8.7 8.4 - 10.2 mg/dL    Bilirubin, Total 0.3 0.2 - 1.0 mg/dL    GOT/AST 14 <=37 Units/L    GPT/ALT 21 <64 Units/L    Alkaline Phosphatase 82 45 - 117 Units/L    Albumin 4.0 3.6 - 5.1 g/dL    Protein, Total 7.9 6.4 - 8.2 g/dL    Globulin 3.9 2.0 - 4.0 g/dL    A/G Ratio 1.0 1.0 - 2.4   D Dimer, Quantitative   Result Value Ref Range    D Dimer, Quantitative <0.19 <0.57 mg/L (FEU)   CBC with Automated Differential (performable only)   Result Value Ref Range    WBC 6.4 4.2 - 11.0 K/mcL    RBC 4.79 4.50 - 5.90 mil/mcL    HGB 14.2 13.0 - 17.0 g/dL    HCT 41.7 39.0 - 51.0 %    MCV 87.1 78.0 - 100.0 fl    MCH 29.6 26.0 - 34.0 pg    MCHC 34.1 32.0 - 36.5 g/dL    RDW-CV 12.5 11.0 - 15.0 %    RDW-SD 39.7 39.0 - 50.0 fL     140 - 450 K/mcL    NRBC 0 <=0 /100 WBC    Neutrophil, Percent 73 %    Lymphocytes, Percent 17 %    Mono, Percent 8 %    Eosinophils, Percent 2 %    Basophils, Percent 0 %    Immature Granulocytes 0 %    Absolute Neutrophils 4.6 1.8 - 7.7 K/mcL    Absolute Lymphocytes 1.1 1.0 - 4.8 K/mcL    Absolute Monocytes 0.5 0.3 - 0.9 K/mcL    Absolute Eosinophils  0.2 0.0 - 0.5 K/mcL    Absolute Basophils 0.0 0.0 - 0.3 K/mcL    Absolute Immmature Granulocytes 0.0 0.0 - 0.2 K/mcL   Gold Top   Result Value Ref Range    Extra Tube Hold for Add Ons    ISTAT8 VENOUS  POINT OF CARE   Result Value Ref Range    BUN - POINT  OF CARE 18 6 - 20 mg/dL    SODIUM - POINT OF CARE 141 135 - 145 mmol/L    POTASSIUM - POINT OF CARE 3.8 3.4 - 5.1 mmol/L    CHLORIDE - POINT OF CARE 101 98 - 107 mmol/L    TCO2 - POINT OF CARE 32 (H) 19 - 24 mmol/L    ANION GAP - POINT OF CARE 13 10 - 20 mmol/L    HEMATOCRIT - POINT OF CARE 43.0 39.0 - 51.0 %    HEMOGLOBIN - POINT OF CARE 14.6 13.0 - 17.0 g/dL    GLUCOSE - POINT OF CARE 103 (H) 70 - 99 mg/dL    CALCIUM, IONIZED - POINT OF CARE 1.24 1.15 - 1.29 mmol/L    Creatinine 1.10 0.67 - 1.17 mg/dL    Glomerular Filtration Rate >90 >90 mL/min/1.73m2   TROPONIN I  POINT OF CARE   Result Value Ref Range    TROPONIN I - POINT OF CARE <0.10 <0.10 ng/mL   TROPONIN I  POINT OF CARE   Result Value Ref Range    TROPONIN I - POINT OF CARE <0.10 <0.10 ng/mL       EKG Results       Radiology Results     Imaging Results          CT HEAD WO CONTRAST (Final result)  Result time 04/24/21 22:09:22    Final result                 Impression:    IMPRESSION:  Negative head CT without contrast.                  Narrative:    EXAM: CT HEAD WO CONTRAST    CLINICAL INFORMATION: Left-sided headache. Rule out cerebral hemorrhage.    COMPARISON:  None available.    TECHNIQUE:  Routine noncontrast head CT spanning cranial vertex through  foramen magnum.   Coronal and sagittal reformats.    FINDINGS:      The ventricular system is normal in size and configuration.     No acute intracranial mass, mass effect or midline shift is present.      No acute intracranial hemorrhage noted.       No abnormal extra-axial masses or fluid collections are noted.    The calvarium is intact.                               XR Chest AP or PA (Final result)  Result time 04/24/21 21:59:24    Final result                 Impression:    IMPRESSION:    1. Increased interstitial changes favored to represent an element of  pulmonary vascular congestion                       Narrative:    EXAM: XR CHEST AP OR PA    INDICATION: chest pain.    COMPARISON:  11/8/2019.    FINDINGS:      Portable AP view.  The cardiomediastinal silhouette is normal.  Vessels  appear mildly congested.  The lungs are clear. No effusions, consolidation,  or pneumothorax.                                ED Medication Orders (From admission, onward)    Ordered Start     Status Ordering Provider    04/24/21 2123 04/24/21 2124  sodium chloride (NORMAL SALINE) 0.9 % bolus 500 mL  ONCE      Last MAR action: Completed NICHOLAS CASTRO    04/24/21 2123 04/24/21 2124  metoCLOPramide (REGLAN) injection 10 mg  ONCE      Last MAR action: Given LORENAEUGENIEN    04/24/21 2123 04/24/21 2124  diphenhydrAMINE (BENADRYL) injection 25 mg  ONCE      Last MAR action: Given LORENANICHOLAS    04/24/21 2123 04/24/21 2124  aspirin chewable 324 mg  ONCE      Last MAR action: Given NICHOLAS CASTRO               [facility]  Vitals  Vitals:    04/24/21 2330 04/25/21 0000 04/25/21 0030 04/25/21 0050   BP: 111/67 110/64 117/67 108/69   BP Location: LUE - Left upper extremity LUE - Left upper extremity LUE - Left upper extremity LUE - Left upper extremity   Patient Position: Semi-Hall's Semi-Hall's Semi-Hall's Semi-Ahll's   Pulse: 62 60 60 62   Resp: 18 18 18 20   Temp:       TempSrc:       SpO2: 100% 100% 100% 100%       ED Course    MDM  Critical Care time spent on this patient outside of billable procedures:  None  ED Diagnosis        Final diagnosis    Chest pain, unspecified type                 Patient care signed out to Dr. Lopez at the end of my shift. Sign-out included relevant details of history, physical, and work-up to date. Will follow up on labs, imaging, and plan of care.        I have reviewed the information recorded by the scribe for accuracy and agree with its contents.    ____________________________________________________________________    Ashok Rausch acting as a scribe for Dr. Nicholas Castro  Dictation #[id number]  Scribe: Ashok Castro,   04/30/21 1326     NEGATIVE

## 2023-11-05 NOTE — CONSULT NOTE ADULT - PROBLEM SELECTOR RECOMMENDATION 2
Pt with hyperkalemia in the setting of ESRD. Serum potassium elevated at 5.7 although mildly hemolyzed. HD as above. Monitor serum potassium.     Discussed with attending on call.   If you have any questions, please feel free to contact me.  Gopi Burgos MD  Nephrology Fellow  [id number] / [employer] Teams (Preferred)  (After 4pm or on weekends, please call the on-call Fellow)

## 2023-11-05 NOTE — ED ADULT NURSE REASSESSMENT NOTE - NS ED NURSE REASSESS COMMENT FT1
Report received from day RN Felicitas. Pt A&ox4, ambulatory, on 2L NC. VSS. Respirations even and unlabored, chest rise and fall equal b/l. Right 22g patent, no redness or swelling to area. Left arm precautions maintained. Pt safety maintained.

## 2023-11-05 NOTE — ED ADULT NURSE REASSESSMENT NOTE - NS ED NURSE REASSESS COMMENT FT1
pt remains alert , oriented x3. noted with episodes coughing- pt becomes short of breath. pulse ox to 89% placed nasal carlos. pulse ox 97 percent. will continue to monitor

## 2023-11-05 NOTE — H&P ADULT - VTE RISK ASSESSMENT
VTE Assessment already completed for this visit Rhomboid Transposition Flap Text: The defect edges were debeveled with a #15 scalpel blade.  Given the location of the defect and the proximity to free margins a rhomboid transposition flap was deemed most appropriate.  Using a sterile surgical marker, an appropriate rhomboid flap was drawn incorporating the defect.    The area thus outlined was incised deep to adipose tissue with a #15 scalpel blade.  The skin margins were undermined to an appropriate distance in all directions utilizing iris scissors.

## 2023-11-05 NOTE — PATIENT PROFILE ADULT - FALL HARM RISK - HARM RISK INTERVENTIONS

## 2023-11-05 NOTE — ED ADULT NURSE REASSESSMENT NOTE - NS ED NURSE REASSESS COMMENT FT1
pt remains alert ,oriented x3. returns from x ray. awaits nephrology consult.  denies difficulty breathing  at rest. will continue to monitor Fluconazole Counseling:  Patient counseled regarding adverse effects of fluconazole including but not limited to headache, diarrhea, nausea, upset stomach, liver function test abnormalities, taste disturbance, and stomach pain.  There is a rare possibility of liver failure that can occur when taking fluconazole.  The patient understands that monitoring of LFTs and kidney function test may be required, especially at baseline. The patient verbalized understanding of the proper use and possible adverse effects of fluconazole.  All of the patient's questions and concerns were addressed.

## 2023-11-05 NOTE — H&P ADULT - PROBLEM SELECTOR PLAN 2
monitor on tele for now  repeat EKG in AM  trop stable from prior, no chest pain  repeat trop in AM    new Q in V2 on EKG  no chest pain  trop stable in comparison to prior (elevated in setting of ESRD)  repeat trop with AM labs  monitor on tele for now

## 2023-11-05 NOTE — ED PROVIDER NOTE - ATTENDING CONTRIBUTION TO CARE
I performed a face-to-face evaluation of the patient and performed a history and physical examination. I agree with the history and physical examination. If this was a PA visit, I personally saw the patient with the PA and performed a substantive portion of the visit including all aspects of the medical decision making.    Dialysis (last 2 days ago). Dc'd a few days ago for volume overload. Returns SOB, cough. Concern for volume overload. Check CBC, CMP, BNP, trop, EKG, CXR. Doesn't make urine. Admit for dialysis.

## 2023-11-05 NOTE — H&P ADULT - NSHPSOCIALHISTORY_GEN_ALL_CORE
Lives with son and daughter  Denies tobacco, alcohol, or illicit drug use   Retired - worked for [employer] and for the school [employer]

## 2023-11-05 NOTE — H&P ADULT - NSHPADDITIONALINFOADULT_GEN_ALL_CORE
Addendum: Elevated d-dimer on AM labs, coags added onto labs; CBC pending, procal also slightly elevated, will check CTPA, will need to discuss with nephro re need for repeat HD post contrast (patient reportedly anuric). Also added LE dopplers for eval. Pending HD this AM. RVP pending collection, provider to RN placed for collection. Pending AM labs may benefit from empiric a/c pending CTPA/LE duplex results, will need discuss with patient this AM after rest of labs result. Addendum: Elevated d-dimer on AM labs, coags added onto labs; CBC pending, procal also slightly elevated, will check CTPA, will need to discuss with nephro re need for repeat HD post contrast (patient reportedly anuric). Also added LE dopplers for eval. Pending HD this AM. RVP pending collection, provider to RN placed for collection. Pending AM labs (eval plt) may benefit from empiric a/c pending CTPA/LE duplex results, will need discuss with patient this AM after rest of labs result. Addendum: Elevated d-dimer on AM labs, coags added onto labs; CBC pending, procal also slightly elevated, will check CTPA, will need to discuss with nephro re need for repeat HD post contrast (patient reportedly anuric). Also added LE dopplers for eval. Pending HD this AM. RVP pending collection, provider to RN placed for collection. Pending AM labs (eval plt) would benefit from empiric heparin gtt pending CTPA/LE duplex results, will need to discuss with patient this AM after rest of labs result. Addendum: Elevated d-dimer on AM labs, coags added onto labs; CBC pending, procal also slightly elevated, will check CTPA, will need to discuss with nephro re need for repeat HD post contrast (patient reportedly anuric). Also added LE dopplers for eval. Pending HD this AM. RVP pending collection, provider to RN placed for collection. Pending AM labs (eval plt/coags) would benefit from empiric heparin gtt pending CTPA/LE duplex results. Discussed results with patient, no history brain bleeds/GIB, easy bleeding/bruising, gum bleeding, or epistaxis, no melena or hematochezia; discussed plans for imaging (CTPA/duplex) and to start on hep gtt pending AM labs while awaiting results of imaging, patient agreeable with plan.

## 2023-11-05 NOTE — ED PROVIDER NOTE - PHYSICAL EXAMINATION
Physical Exam:  Gen: NAD, AOx3, non-toxic appearing, able to ambulate without assistance  Head: NCAT  HEENT: EOMI, PEERLA, normal conjunctiva, tongue midline, oral mucosa moist  Lung: no respiratory distress, bilateral rales, B/L, speaking in full sentences  CV: RRR  Abd: soft, NT, ND, no guarding, no rigidity, no rebound tenderness, no CVA tenderness   MSK: no visible deformities, ROM normal in UE/LE, no back pain  Neuro: No focal sensory or motor deficits  Skin: Warm, well perfused, no rash, no leg swelling  Psych: normal affect, calm

## 2023-11-05 NOTE — ED ADULT TRIAGE NOTE - CHIEF COMPLAINT QUOTE
pt c/o cp sob and cough  states was seen about a week ago for the same/ pt thinks  chest pain is from cough

## 2023-11-05 NOTE — H&P ADULT - ASSESSMENT
88 -year-old male with NIDDM2, HTN, HLD, ESRD on HD MWF via LUE forearm AVF (last HD Friday, completed session), presenting from home with 1.5weeks of dry cough, shortness of breath, orthopnea.

## 2023-11-05 NOTE — ED ADULT NURSE NOTE - NSFALLHARMRISKINTERV_ED_ALL_ED

## 2023-11-05 NOTE — ED PROVIDER NOTE - OBJECTIVE STATEMENT
Patient is a 88-year-old male with a past medical history of ESRD on dialysis Monday Wednesday Friday, hypertension, hyperlipidemia who presents emergency department complaining of chest pain, cough, shortness of breath x1-1/2-week.  Patient was recently evaluated in the emergency department and was recommended that he stay in the hospital for fluid removal.  However, the patient requested that he follow-up with his outpatient dialysis center.  Patient is presenting the emergency department with similar symptoms.  Patient states that he is unable to lay down due to a cough and shortness of breath.  Patient denies any fevers, chills, recent travel.  Patient does not make urine.  Patient states that he has been compliant with his dialysis sessions.

## 2023-11-05 NOTE — H&P ADULT - NSHPPHYSICALEXAM_GEN_ALL_CORE
Vital Signs Last 24 Hrs  T(C): 36.1 (05 Nov 2023 19:00), Max: 36.6 (05 Nov 2023 17:24)  T(F): 96.9 (05 Nov 2023 19:00), Max: 97.8 (05 Nov 2023 17:24)  HR: 69 (05 Nov 2023 19:00) (68 - 97)  BP: 178/71 (05 Nov 2023 19:00) (149/69 - 178/71)  BP(mean): 97 (05 Nov 2023 14:54) (97 - 97)  RR: 20 (05 Nov 2023 19:00) (18 - 22)  SpO2: 98% (05 Nov 2023 19:00) (95% - 100%)    Parameters below as of 05 Nov 2023 19:00  Patient On (Oxygen Delivery Method): nasal cannula  O2 Flow (L/min): 2    PHYSICAL EXAM:  GENERAL: NAD  HEAD:  Atraumatic, Normocephalic  EYES: PERRL, conjunctiva and sclera clear  NECK: Supple, No JVD  CHEST/LUNG: scattered course breath sounds b/l bases; scattered expiratory wheeze L anterior chest; No rhonchi; normal work of breathing, speaking in full sentences; dry cough on exam  HEART: Regular rate and rhythm; No murmurs, rubs, or gallops, (+)S1, S2  ABDOMEN: Soft, Nontender, Nondistended; Normal Bowel sounds   EXTREMITIES:  2+ Peripheral Pulses, No clubbing, cyanosis, or edema  PSYCH: normal mood and affect, A&Ox3  NEUROLOGY: no focal neuro deficits  SKIN: Reports scab on fistula intermittently that falls off on its own; scarring Vital Signs Last 24 Hrs  T(C): 36.1 (05 Nov 2023 19:00), Max: 36.6 (05 Nov 2023 17:24)  T(F): 96.9 (05 Nov 2023 19:00), Max: 97.8 (05 Nov 2023 17:24)  HR: 69 (05 Nov 2023 19:00) (68 - 97)  BP: 178/71 (05 Nov 2023 19:00) (149/69 - 178/71)  BP(mean): 97 (05 Nov 2023 14:54) (97 - 97)  RR: 20 (05 Nov 2023 19:00) (18 - 22)  SpO2: 98% (05 Nov 2023 19:00) (95% - 100%)    Parameters below as of 05 Nov 2023 19:00  Patient On (Oxygen Delivery Method): nasal cannula  O2 Flow (L/min): 2    PHYSICAL EXAM:  GENERAL: NAD  HEAD:  Atraumatic, Normocephalic  EYES: PERRL, conjunctiva and sclera clear  NECK: Supple, No JVD  CHEST/LUNG: scattered course breath sounds b/l bases; scattered expiratory wheeze L anterior chest; No rhonchi; normal work of breathing, speaking in full sentences; dry cough on exam  HEART: Regular rate and rhythm; No murmurs, rubs, or gallops, (+)S1, S2  ABDOMEN: Soft, Nontender, Nondistended; Normal Bowel sounds   EXTREMITIES:  2+ Peripheral Pulses, No clubbing, cyanosis, or edema  PSYCH: normal mood and affect, A&Ox3  NEUROLOGY: no focal neuro deficits  SKIN: Scab on inferior part of fistula without discharge or surrounding erythema; scarring overlying rest of fistula Vital Signs Last 24 Hrs  T(C): 36.1 (05 Nov 2023 19:00), Max: 36.6 (05 Nov 2023 17:24)  T(F): 96.9 (05 Nov 2023 19:00), Max: 97.8 (05 Nov 2023 17:24)  HR: 69 (05 Nov 2023 19:00) (68 - 97)  BP: 178/71 (05 Nov 2023 19:00) (149/69 - 178/71)  BP(mean): 97 (05 Nov 2023 14:54) (97 - 97)  RR: 20 (05 Nov 2023 19:00) (18 - 22)  SpO2: 98% (05 Nov 2023 19:00) (95% - 100%)    Parameters below as of 05 Nov 2023 19:00  Patient On (Oxygen Delivery Method): nasal cannula  O2 Flow (L/min): 2    PHYSICAL EXAM:  GENERAL: NAD  HEAD:  Atraumatic, Normocephalic  EYES: PERRL, conjunctiva and sclera clear  ENT: dry MM, dry lips  NECK: Supple, No JVD  CHEST/LUNG: scattered course breath sounds b/l bases; scattered expiratory wheeze L anterior chest; No rhonchi; normal work of breathing, speaking in full sentences; dry cough on exam  HEART: Regular rate and rhythm; No murmurs, rubs, or gallops, (+)S1, S2  ABDOMEN: Soft, Nontender, Nondistended; Normal Bowel sounds   EXTREMITIES:  2+ Peripheral Pulses, No clubbing, cyanosis, or edema  PSYCH: normal mood and affect, A&Ox3  NEUROLOGY: no focal neuro deficits  SKIN: Scab on inferior part of fistula without discharge or surrounding erythema; scarring overlying rest of fistula

## 2023-11-05 NOTE — ED ADULT NURSE NOTE - OBJECTIVE STATEMENT
pt to rm 7 alert, oriented x3. reports difficulty breathing with cough x past 10 days. states no chest pain at present. reports ch pains intermittent upon coughing. . mds at bedside= pt to be admitted for c/o difficulty breathing. respirations non labored at rest.  placed c monitor- rsr. pt with l av fistula- warm to touch with positive thrill ,bruit. last dialysis friday- iv access,labs sent. will continue to monitor

## 2023-11-05 NOTE — CONSULT NOTE ADULT - SUBJECTIVE AND OBJECTIVE BOX
[facility] DIVISION OF KIDNEY DISEASES AND HYPERTENSION -- [phone]  -- INITIAL CONSULT NOTE  --------------------------------------------------------------------------------  HPI: 88M with PMH of HTN, HLD, DM2, and ESRD on HD who presents to the ED complaining of difficulty breathing and cough. Nephrology consulted for management of ESRD/HD.    Pt receives HD TIW on MWF schedule via LUE AVF at [facility] under the supervision of Dr. Abreu. Pt is on dialysis due to long-standing HTN and DM2. Does not make urine. Last HD was on 11/3/23. Pt normally gets 2-2.5L of UF.     Pt seen and evaluated in the ED with wife present. Pt complaining of shortness of breath, cough, and difficulty sleeping because of breathing difficulty for the past week.     PAST HISTORY  --------------------------------------------------------------------------------  PAST MEDICAL & SURGICAL HISTORY:  Diabetes  HTN (hypertension)  HD MWF  ESRD (end stage renal disease)  HLD (hyperlipidemia)  AVF (arteriovenous fistula)    FAMILY HISTORY:  FH: diabetes mellitus (Mother)    PAST SOCIAL HISTORY:  No history of smoking    ALLERGIES & MEDICATIONS  --------------------------------------------------------------------------------  Allergies  No Known Allergies    Intolerances    Standing Inpatient Medicationschlorhexidine 2% Cloths 1 Application(s) Topical daily    PRN Inpatient Medicationssodium chloride 0.9% Bolus. 100 milliLiter(s) IV Bolus every 5 minutes PRN    REVIEW OF SYSTEMS  --------------------------------------------------------------------------------  Gen: No fevers/chills  Skin: No rashes  Head/Eyes/Ears: No HA  Respiratory: +dyspnea and cough  CV: No chest pain  GI: No abdominal pain, diarrhea  MSK: +mild leg swelling  Heme: No easy bruising or bleeding  Psych: No significant depression    All other systems were reviewed and are negative, except as noted.    VITALS/PHYSICAL EXAM  --------------------------------------------------------------------------------  T(C): 36.4 (11-05-23 @ 11:40), Max: 36.4 (11-05-23 @ 09:29)  HR: 78 (11-05-23 @ 12:53) (68 - 78)  BP: 156/71 (11-05-23 @ 12:53) (149/69 - 163/77)  RR: 22 (11-05-23 @ 12:53) (18 - 22)  SpO2: 95% (11-05-23 @ 12:53) (95% - 100%)  Wt(kg): --    Physical Exam:  Gen: NAD  HEENT: MMM  Pulm: Scattered rales B/L  CV: S1S2  Abd: Soft, +BS   Ext: trace B/L LE edema  Neuro: Awake  Skin: Warm and dry  Vascular access: LUE AVF, skin intact, moderate thrill felt, mild scabbing    LABS/STUDIES  --------------------------------------------------------------------------------              11.8   5.91  >-----------<  142      [11-05-23 @ 10:30]              39.2     143  |  102  |  43  ----------------------------<  107      [11-05-23 @ 10:30]  5.7   |  27  |  10.94        Ca     9.9     [11-05-23 @ 10:30]    TPro  7.4  /  Alb  4.1  /  TBili  0.4  /  DBili  x   /  AST  22  /  ALT  10  /  AlkPhos  86  [11-05-23 @ 10:30]    Creatinine Trend:  SCr 10.94 [11-05 @ 10:30]  SCr 9.43 [10-26 @ 20:00]    PTH -- (Ca --)      [01-26-23 @ 06:22]   102  Vitamin D (25OH) 58.8      [01-26-23 @ 06:22]

## 2023-11-05 NOTE — H&P ADULT - PROBLEM SELECTOR PLAN 4
plan as above  c/w calcium acetate w/meals  call VSx in AM for eval of fistula/?need for imaging plan as above  c/w calcium acetate w/meals  call VSx in AM for eval of fistula/?need for imaging    #anemia  likely secondary to AOCKD  at goal  monitor/trend

## 2023-11-06 LAB
A1C WITH ESTIMATED AVERAGE GLUCOSE RESULT: 5.2 % — SIGNIFICANT CHANGE UP (ref 4–5.6)
A1C WITH ESTIMATED AVERAGE GLUCOSE RESULT: 5.2 % — SIGNIFICANT CHANGE UP (ref 4–5.6)
ANION GAP SERPL CALC-SCNC: 20 MMOL/L — HIGH (ref 7–14)
ANION GAP SERPL CALC-SCNC: 20 MMOL/L — HIGH (ref 7–14)
APTT BLD: 36.8 SEC — HIGH (ref 24.5–35.6)
APTT BLD: 36.8 SEC — HIGH (ref 24.5–35.6)
BASE EXCESS BLDV CALC-SCNC: 3.4 MMOL/L — HIGH (ref -2–3)
BASE EXCESS BLDV CALC-SCNC: 3.4 MMOL/L — HIGH (ref -2–3)
BLOOD GAS VENOUS COMPREHENSIVE RESULT: SIGNIFICANT CHANGE UP
BLOOD GAS VENOUS COMPREHENSIVE RESULT: SIGNIFICANT CHANGE UP
BUN SERPL-MCNC: 54 MG/DL — HIGH (ref 7–23)
BUN SERPL-MCNC: 54 MG/DL — HIGH (ref 7–23)
CALCIUM SERPL-MCNC: 10 MG/DL — SIGNIFICANT CHANGE UP (ref 8.4–10.5)
CALCIUM SERPL-MCNC: 10 MG/DL — SIGNIFICANT CHANGE UP (ref 8.4–10.5)
CHLORIDE BLDV-SCNC: 104 MMOL/L — SIGNIFICANT CHANGE UP (ref 96–108)
CHLORIDE BLDV-SCNC: 104 MMOL/L — SIGNIFICANT CHANGE UP (ref 96–108)
CHLORIDE SERPL-SCNC: 101 MMOL/L — SIGNIFICANT CHANGE UP (ref 98–107)
CHLORIDE SERPL-SCNC: 101 MMOL/L — SIGNIFICANT CHANGE UP (ref 98–107)
CO2 BLDV-SCNC: 30.6 MMOL/L — HIGH (ref 22–26)
CO2 BLDV-SCNC: 30.6 MMOL/L — HIGH (ref 22–26)
CO2 SERPL-SCNC: 20 MMOL/L — LOW (ref 22–31)
CO2 SERPL-SCNC: 20 MMOL/L — LOW (ref 22–31)
CREAT SERPL-MCNC: 12.16 MG/DL — HIGH (ref 0.5–1.3)
CREAT SERPL-MCNC: 12.16 MG/DL — HIGH (ref 0.5–1.3)
D DIMER BLD IA.RAPID-MCNC: HIGH NG/ML DDU
D DIMER BLD IA.RAPID-MCNC: HIGH NG/ML DDU
EGFR: 4 ML/MIN/1.73M2 — LOW
EGFR: 4 ML/MIN/1.73M2 — LOW
ESTIMATED AVERAGE GLUCOSE: 103 — SIGNIFICANT CHANGE UP
ESTIMATED AVERAGE GLUCOSE: 103 — SIGNIFICANT CHANGE UP
GAS PNL BLDV: 140 MMOL/L — SIGNIFICANT CHANGE UP (ref 136–145)
GAS PNL BLDV: 140 MMOL/L — SIGNIFICANT CHANGE UP (ref 136–145)
GLUCOSE BLDC GLUCOMTR-MCNC: 110 MG/DL — HIGH (ref 70–99)
GLUCOSE BLDC GLUCOMTR-MCNC: 110 MG/DL — HIGH (ref 70–99)
GLUCOSE BLDC GLUCOMTR-MCNC: 113 MG/DL — HIGH (ref 70–99)
GLUCOSE BLDC GLUCOMTR-MCNC: 113 MG/DL — HIGH (ref 70–99)
GLUCOSE BLDC GLUCOMTR-MCNC: 120 MG/DL — HIGH (ref 70–99)
GLUCOSE BLDC GLUCOMTR-MCNC: 120 MG/DL — HIGH (ref 70–99)
GLUCOSE BLDC GLUCOMTR-MCNC: 82 MG/DL — SIGNIFICANT CHANGE UP (ref 70–99)
GLUCOSE BLDC GLUCOMTR-MCNC: 82 MG/DL — SIGNIFICANT CHANGE UP (ref 70–99)
GLUCOSE BLDC GLUCOMTR-MCNC: 83 MG/DL — SIGNIFICANT CHANGE UP (ref 70–99)
GLUCOSE BLDC GLUCOMTR-MCNC: 83 MG/DL — SIGNIFICANT CHANGE UP (ref 70–99)
GLUCOSE BLDC GLUCOMTR-MCNC: 90 MG/DL — SIGNIFICANT CHANGE UP (ref 70–99)
GLUCOSE BLDC GLUCOMTR-MCNC: 90 MG/DL — SIGNIFICANT CHANGE UP (ref 70–99)
GLUCOSE BLDV-MCNC: 86 MG/DL — SIGNIFICANT CHANGE UP (ref 70–99)
GLUCOSE BLDV-MCNC: 86 MG/DL — SIGNIFICANT CHANGE UP (ref 70–99)
GLUCOSE SERPL-MCNC: 81 MG/DL — SIGNIFICANT CHANGE UP (ref 70–99)
GLUCOSE SERPL-MCNC: 81 MG/DL — SIGNIFICANT CHANGE UP (ref 70–99)
HCO3 BLDV-SCNC: 29 MMOL/L — SIGNIFICANT CHANGE UP (ref 22–29)
HCO3 BLDV-SCNC: 29 MMOL/L — SIGNIFICANT CHANGE UP (ref 22–29)
HCT VFR BLD CALC: 43.1 % — SIGNIFICANT CHANGE UP (ref 39–50)
HCT VFR BLD CALC: 43.1 % — SIGNIFICANT CHANGE UP (ref 39–50)
HCT VFR BLDA CALC: 37 % — LOW (ref 39–51)
HCT VFR BLDA CALC: 37 % — LOW (ref 39–51)
HGB BLD CALC-MCNC: 12.2 G/DL — LOW (ref 12.6–17.4)
HGB BLD CALC-MCNC: 12.2 G/DL — LOW (ref 12.6–17.4)
HGB BLD-MCNC: 12.7 G/DL — LOW (ref 13–17)
HGB BLD-MCNC: 12.7 G/DL — LOW (ref 13–17)
INR BLD: 1 RATIO — SIGNIFICANT CHANGE UP (ref 0.85–1.18)
INR BLD: 1 RATIO — SIGNIFICANT CHANGE UP (ref 0.85–1.18)
LACTATE BLDV-MCNC: 1.6 MMOL/L — SIGNIFICANT CHANGE UP (ref 0.5–2)
LACTATE BLDV-MCNC: 1.6 MMOL/L — SIGNIFICANT CHANGE UP (ref 0.5–2)
MAGNESIUM SERPL-MCNC: 2.3 MG/DL — SIGNIFICANT CHANGE UP (ref 1.6–2.6)
MAGNESIUM SERPL-MCNC: 2.3 MG/DL — SIGNIFICANT CHANGE UP (ref 1.6–2.6)
MCHC RBC-ENTMCNC: 26.4 PG — LOW (ref 27–34)
MCHC RBC-ENTMCNC: 26.4 PG — LOW (ref 27–34)
MCHC RBC-ENTMCNC: 29.5 GM/DL — LOW (ref 32–36)
MCHC RBC-ENTMCNC: 29.5 GM/DL — LOW (ref 32–36)
MCV RBC AUTO: 89.6 FL — SIGNIFICANT CHANGE UP (ref 80–100)
MCV RBC AUTO: 89.6 FL — SIGNIFICANT CHANGE UP (ref 80–100)
MRSA PCR RESULT.: SIGNIFICANT CHANGE UP
MRSA PCR RESULT.: SIGNIFICANT CHANGE UP
NRBC # BLD: 0 /100 WBCS — SIGNIFICANT CHANGE UP (ref 0–0)
NRBC # BLD: 0 /100 WBCS — SIGNIFICANT CHANGE UP (ref 0–0)
NRBC # FLD: 0 K/UL — SIGNIFICANT CHANGE UP (ref 0–0)
NRBC # FLD: 0 K/UL — SIGNIFICANT CHANGE UP (ref 0–0)
PCO2 BLDV: 48 MMHG — SIGNIFICANT CHANGE UP (ref 42–55)
PCO2 BLDV: 48 MMHG — SIGNIFICANT CHANGE UP (ref 42–55)
PH BLDV: 7.39 — SIGNIFICANT CHANGE UP (ref 7.32–7.43)
PH BLDV: 7.39 — SIGNIFICANT CHANGE UP (ref 7.32–7.43)
PHOSPHATE SERPL-MCNC: 4.4 MG/DL — SIGNIFICANT CHANGE UP (ref 2.5–4.5)
PHOSPHATE SERPL-MCNC: 4.4 MG/DL — SIGNIFICANT CHANGE UP (ref 2.5–4.5)
PLATELET # BLD AUTO: 113 K/UL — LOW (ref 150–400)
PLATELET # BLD AUTO: 113 K/UL — LOW (ref 150–400)
PO2 BLDV: 54 MMHG — HIGH (ref 25–45)
PO2 BLDV: 54 MMHG — HIGH (ref 25–45)
POTASSIUM BLDV-SCNC: 5.9 MMOL/L — HIGH (ref 3.5–5.1)
POTASSIUM BLDV-SCNC: 5.9 MMOL/L — HIGH (ref 3.5–5.1)
POTASSIUM SERPL-MCNC: 5.4 MMOL/L — HIGH (ref 3.5–5.3)
POTASSIUM SERPL-MCNC: 5.4 MMOL/L — HIGH (ref 3.5–5.3)
POTASSIUM SERPL-SCNC: 5.4 MMOL/L — HIGH (ref 3.5–5.3)
POTASSIUM SERPL-SCNC: 5.4 MMOL/L — HIGH (ref 3.5–5.3)
PROCALCITONIN SERPL-MCNC: 0.55 NG/ML — HIGH (ref 0.02–0.1)
PROCALCITONIN SERPL-MCNC: 0.55 NG/ML — HIGH (ref 0.02–0.1)
PROTHROM AB SERPL-ACNC: 11.3 SEC — SIGNIFICANT CHANGE UP (ref 9.5–13)
PROTHROM AB SERPL-ACNC: 11.3 SEC — SIGNIFICANT CHANGE UP (ref 9.5–13)
RBC # BLD: 4.81 M/UL — SIGNIFICANT CHANGE UP (ref 4.2–5.8)
RBC # BLD: 4.81 M/UL — SIGNIFICANT CHANGE UP (ref 4.2–5.8)
RBC # FLD: 16.3 % — HIGH (ref 10.3–14.5)
RBC # FLD: 16.3 % — HIGH (ref 10.3–14.5)
S AUREUS DNA NOSE QL NAA+PROBE: SIGNIFICANT CHANGE UP
S AUREUS DNA NOSE QL NAA+PROBE: SIGNIFICANT CHANGE UP
SAO2 % BLDV: 81.3 % — SIGNIFICANT CHANGE UP (ref 67–88)
SAO2 % BLDV: 81.3 % — SIGNIFICANT CHANGE UP (ref 67–88)
SODIUM SERPL-SCNC: 141 MMOL/L — SIGNIFICANT CHANGE UP (ref 135–145)
SODIUM SERPL-SCNC: 141 MMOL/L — SIGNIFICANT CHANGE UP (ref 135–145)
TROPONIN T, HIGH SENSITIVITY RESULT: 69 NG/L — CRITICAL HIGH
TROPONIN T, HIGH SENSITIVITY RESULT: 69 NG/L — CRITICAL HIGH
WBC # BLD: 4.73 K/UL — SIGNIFICANT CHANGE UP (ref 3.8–10.5)
WBC # BLD: 4.73 K/UL — SIGNIFICANT CHANGE UP (ref 3.8–10.5)
WBC # FLD AUTO: 4.73 K/UL — SIGNIFICANT CHANGE UP (ref 3.8–10.5)
WBC # FLD AUTO: 4.73 K/UL — SIGNIFICANT CHANGE UP (ref 3.8–10.5)

## 2023-11-06 PROCEDURE — 93970 EXTREMITY STUDY: CPT | Mod: 26

## 2023-11-06 PROCEDURE — 99232 SBSQ HOSP IP/OBS MODERATE 35: CPT

## 2023-11-06 PROCEDURE — 99222 1ST HOSP IP/OBS MODERATE 55: CPT

## 2023-11-06 RX ORDER — HEPARIN SODIUM 5000 [USP'U]/ML
INJECTION INTRAVENOUS; SUBCUTANEOUS
Qty: 25000 | Refills: 0 | Status: DISCONTINUED | OUTPATIENT
Start: 2023-11-06 | End: 2023-11-07

## 2023-11-06 RX ORDER — IPRATROPIUM/ALBUTEROL SULFATE 18-103MCG
3 AEROSOL WITH ADAPTER (GRAM) INHALATION EVERY 6 HOURS
Refills: 0 | Status: DISCONTINUED | OUTPATIENT
Start: 2023-11-06 | End: 2023-11-08

## 2023-11-06 RX ADMIN — Medication 3 MILLILITER(S): at 21:10

## 2023-11-06 RX ADMIN — CHLORHEXIDINE GLUCONATE 1 APPLICATION(S): 213 SOLUTION TOPICAL at 12:05

## 2023-11-06 RX ADMIN — Medication 667 MILLIGRAM(S): at 18:30

## 2023-11-06 RX ADMIN — SIMVASTATIN 20 MILLIGRAM(S): 20 TABLET, FILM COATED ORAL at 20:48

## 2023-11-06 RX ADMIN — HEPARIN SODIUM 1100 UNIT(S)/HR: 5000 INJECTION INTRAVENOUS; SUBCUTANEOUS at 19:26

## 2023-11-06 RX ADMIN — HEPARIN SODIUM 1100 UNIT(S)/HR: 5000 INJECTION INTRAVENOUS; SUBCUTANEOUS at 17:15

## 2023-11-06 RX ADMIN — Medication 667 MILLIGRAM(S): at 12:05

## 2023-11-06 NOTE — CHART NOTE - NSCHARTNOTEFT_GEN_A_CORE
AM D-D[place] [id number]. Will order CTA chest to r/o PE and discuss with nephrology team.     Case discussed with attending Dr. Ela Sexton PA-C  Department of Medicine [id number]

## 2023-11-07 ENCOUNTER — TRANSCRIPTION ENCOUNTER (OUTPATIENT)
Age: 88
End: 2023-11-07

## 2023-11-07 DIAGNOSIS — D69.6 THROMBOCYTOPENIA, UNSPECIFIED: ICD-10-CM

## 2023-11-07 DIAGNOSIS — D64.9 ANEMIA, UNSPECIFIED: ICD-10-CM

## 2023-11-07 DIAGNOSIS — R93.89 ABNORMAL FINDINGS ON DIAGNOSTIC IMAGING OF OTHER SPECIFIED BODY STRUCTURES: ICD-10-CM

## 2023-11-07 LAB
ANION GAP SERPL CALC-SCNC: 17 MMOL/L — HIGH (ref 7–14)
ANION GAP SERPL CALC-SCNC: 17 MMOL/L — HIGH (ref 7–14)
APTT BLD: 32.1 SEC — SIGNIFICANT CHANGE UP (ref 24.5–35.6)
APTT BLD: 32.1 SEC — SIGNIFICANT CHANGE UP (ref 24.5–35.6)
APTT BLD: 38.9 SEC — HIGH (ref 24.5–35.6)
APTT BLD: 38.9 SEC — HIGH (ref 24.5–35.6)
BUN SERPL-MCNC: 35 MG/DL — HIGH (ref 7–23)
BUN SERPL-MCNC: 35 MG/DL — HIGH (ref 7–23)
CALCIUM SERPL-MCNC: 9.5 MG/DL — SIGNIFICANT CHANGE UP (ref 8.4–10.5)
CALCIUM SERPL-MCNC: 9.5 MG/DL — SIGNIFICANT CHANGE UP (ref 8.4–10.5)
CHLORIDE SERPL-SCNC: 97 MMOL/L — LOW (ref 98–107)
CHLORIDE SERPL-SCNC: 97 MMOL/L — LOW (ref 98–107)
CO2 SERPL-SCNC: 26 MMOL/L — SIGNIFICANT CHANGE UP (ref 22–31)
CO2 SERPL-SCNC: 26 MMOL/L — SIGNIFICANT CHANGE UP (ref 22–31)
CREAT SERPL-MCNC: 9.69 MG/DL — HIGH (ref 0.5–1.3)
CREAT SERPL-MCNC: 9.69 MG/DL — HIGH (ref 0.5–1.3)
D DIMER BLD IA.RAPID-MCNC: 287 NG/ML DDU — HIGH
D DIMER BLD IA.RAPID-MCNC: 287 NG/ML DDU — HIGH
EGFR: 5 ML/MIN/1.73M2 — LOW
EGFR: 5 ML/MIN/1.73M2 — LOW
GLUCOSE BLDC GLUCOMTR-MCNC: 122 MG/DL — HIGH (ref 70–99)
GLUCOSE BLDC GLUCOMTR-MCNC: 122 MG/DL — HIGH (ref 70–99)
GLUCOSE BLDC GLUCOMTR-MCNC: 126 MG/DL — HIGH (ref 70–99)
GLUCOSE BLDC GLUCOMTR-MCNC: 126 MG/DL — HIGH (ref 70–99)
GLUCOSE BLDC GLUCOMTR-MCNC: 82 MG/DL — SIGNIFICANT CHANGE UP (ref 70–99)
GLUCOSE BLDC GLUCOMTR-MCNC: 82 MG/DL — SIGNIFICANT CHANGE UP (ref 70–99)
GLUCOSE BLDC GLUCOMTR-MCNC: 91 MG/DL — SIGNIFICANT CHANGE UP (ref 70–99)
GLUCOSE BLDC GLUCOMTR-MCNC: 91 MG/DL — SIGNIFICANT CHANGE UP (ref 70–99)
GLUCOSE BLDC GLUCOMTR-MCNC: 94 MG/DL — SIGNIFICANT CHANGE UP (ref 70–99)
GLUCOSE BLDC GLUCOMTR-MCNC: 94 MG/DL — SIGNIFICANT CHANGE UP (ref 70–99)
GLUCOSE BLDC GLUCOMTR-MCNC: 96 MG/DL — SIGNIFICANT CHANGE UP (ref 70–99)
GLUCOSE BLDC GLUCOMTR-MCNC: 96 MG/DL — SIGNIFICANT CHANGE UP (ref 70–99)
GLUCOSE SERPL-MCNC: 82 MG/DL — SIGNIFICANT CHANGE UP (ref 70–99)
GLUCOSE SERPL-MCNC: 82 MG/DL — SIGNIFICANT CHANGE UP (ref 70–99)
HCT VFR BLD CALC: 36.4 % — LOW (ref 39–50)
HCT VFR BLD CALC: 36.4 % — LOW (ref 39–50)
HCT VFR BLD CALC: 36.5 % — LOW (ref 39–50)
HCT VFR BLD CALC: 36.5 % — LOW (ref 39–50)
HGB BLD-MCNC: 11.3 G/DL — LOW (ref 13–17)
HGB BLD-MCNC: 11.3 G/DL — LOW (ref 13–17)
HGB BLD-MCNC: 11.4 G/DL — LOW (ref 13–17)
HGB BLD-MCNC: 11.4 G/DL — LOW (ref 13–17)
MAGNESIUM SERPL-MCNC: 2.2 MG/DL — SIGNIFICANT CHANGE UP (ref 1.6–2.6)
MAGNESIUM SERPL-MCNC: 2.2 MG/DL — SIGNIFICANT CHANGE UP (ref 1.6–2.6)
MCHC RBC-ENTMCNC: 26.2 PG — LOW (ref 27–34)
MCHC RBC-ENTMCNC: 26.2 PG — LOW (ref 27–34)
MCHC RBC-ENTMCNC: 26.3 PG — LOW (ref 27–34)
MCHC RBC-ENTMCNC: 26.3 PG — LOW (ref 27–34)
MCHC RBC-ENTMCNC: 31 GM/DL — LOW (ref 32–36)
MCHC RBC-ENTMCNC: 31 GM/DL — LOW (ref 32–36)
MCHC RBC-ENTMCNC: 31.2 GM/DL — LOW (ref 32–36)
MCHC RBC-ENTMCNC: 31.2 GM/DL — LOW (ref 32–36)
MCV RBC AUTO: 84.3 FL — SIGNIFICANT CHANGE UP (ref 80–100)
MCV RBC AUTO: 84.3 FL — SIGNIFICANT CHANGE UP (ref 80–100)
MCV RBC AUTO: 84.5 FL — SIGNIFICANT CHANGE UP (ref 80–100)
MCV RBC AUTO: 84.5 FL — SIGNIFICANT CHANGE UP (ref 80–100)
NRBC # BLD: 0 /100 WBCS — SIGNIFICANT CHANGE UP (ref 0–0)
NRBC # FLD: 0 K/UL — SIGNIFICANT CHANGE UP (ref 0–0)
PHOSPHATE SERPL-MCNC: 4.6 MG/DL — HIGH (ref 2.5–4.5)
PHOSPHATE SERPL-MCNC: 4.6 MG/DL — HIGH (ref 2.5–4.5)
PLATELET # BLD AUTO: 136 K/UL — LOW (ref 150–400)
PLATELET # BLD AUTO: 136 K/UL — LOW (ref 150–400)
PLATELET # BLD AUTO: 137 K/UL — LOW (ref 150–400)
PLATELET # BLD AUTO: 137 K/UL — LOW (ref 150–400)
POTASSIUM SERPL-MCNC: 4 MMOL/L — SIGNIFICANT CHANGE UP (ref 3.5–5.3)
POTASSIUM SERPL-MCNC: 4 MMOL/L — SIGNIFICANT CHANGE UP (ref 3.5–5.3)
POTASSIUM SERPL-SCNC: 4 MMOL/L — SIGNIFICANT CHANGE UP (ref 3.5–5.3)
POTASSIUM SERPL-SCNC: 4 MMOL/L — SIGNIFICANT CHANGE UP (ref 3.5–5.3)
RBC # BLD: 4.31 M/UL — SIGNIFICANT CHANGE UP (ref 4.2–5.8)
RBC # BLD: 4.31 M/UL — SIGNIFICANT CHANGE UP (ref 4.2–5.8)
RBC # BLD: 4.33 M/UL — SIGNIFICANT CHANGE UP (ref 4.2–5.8)
RBC # BLD: 4.33 M/UL — SIGNIFICANT CHANGE UP (ref 4.2–5.8)
RBC # FLD: 15.6 % — HIGH (ref 10.3–14.5)
RBC # FLD: 15.6 % — HIGH (ref 10.3–14.5)
RBC # FLD: 15.8 % — HIGH (ref 10.3–14.5)
RBC # FLD: 15.8 % — HIGH (ref 10.3–14.5)
SODIUM SERPL-SCNC: 140 MMOL/L — SIGNIFICANT CHANGE UP (ref 135–145)
SODIUM SERPL-SCNC: 140 MMOL/L — SIGNIFICANT CHANGE UP (ref 135–145)
WBC # BLD: 4.66 K/UL — SIGNIFICANT CHANGE UP (ref 3.8–10.5)
WBC # BLD: 4.66 K/UL — SIGNIFICANT CHANGE UP (ref 3.8–10.5)
WBC # BLD: 4.97 K/UL — SIGNIFICANT CHANGE UP (ref 3.8–10.5)
WBC # BLD: 4.97 K/UL — SIGNIFICANT CHANGE UP (ref 3.8–10.5)
WBC # FLD AUTO: 4.66 K/UL — SIGNIFICANT CHANGE UP (ref 3.8–10.5)
WBC # FLD AUTO: 4.66 K/UL — SIGNIFICANT CHANGE UP (ref 3.8–10.5)
WBC # FLD AUTO: 4.97 K/UL — SIGNIFICANT CHANGE UP (ref 3.8–10.5)
WBC # FLD AUTO: 4.97 K/UL — SIGNIFICANT CHANGE UP (ref 3.8–10.5)

## 2023-11-07 PROCEDURE — 74176 CT ABD & PELVIS W/O CONTRAST: CPT | Mod: 26

## 2023-11-07 PROCEDURE — 99233 SBSQ HOSP IP/OBS HIGH 50: CPT | Mod: GC

## 2023-11-07 PROCEDURE — 99223 1ST HOSP IP/OBS HIGH 75: CPT | Mod: GC

## 2023-11-07 PROCEDURE — 71275 CT ANGIOGRAPHY CHEST: CPT | Mod: 26

## 2023-11-07 PROCEDURE — 99233 SBSQ HOSP IP/OBS HIGH 50: CPT

## 2023-11-07 RX ORDER — HEPARIN SODIUM 5000 [USP'U]/ML
5000 INJECTION INTRAVENOUS; SUBCUTANEOUS EVERY 12 HOURS
Refills: 0 | Status: DISCONTINUED | OUTPATIENT
Start: 2023-11-07 | End: 2023-11-08

## 2023-11-07 RX ADMIN — HEPARIN SODIUM 1400 UNIT(S)/HR: 5000 INJECTION INTRAVENOUS; SUBCUTANEOUS at 07:44

## 2023-11-07 RX ADMIN — Medication 3 MILLILITER(S): at 09:24

## 2023-11-07 RX ADMIN — HEPARIN SODIUM 1400 UNIT(S)/HR: 5000 INJECTION INTRAVENOUS; SUBCUTANEOUS at 01:05

## 2023-11-07 RX ADMIN — Medication 667 MILLIGRAM(S): at 18:12

## 2023-11-07 RX ADMIN — Medication 3 MILLILITER(S): at 22:39

## 2023-11-07 RX ADMIN — SENNA PLUS 1 TABLET(S): 8.6 TABLET ORAL at 21:59

## 2023-11-07 RX ADMIN — SIMVASTATIN 20 MILLIGRAM(S): 20 TABLET, FILM COATED ORAL at 22:00

## 2023-11-07 RX ADMIN — AMLODIPINE BESYLATE 10 MILLIGRAM(S): 2.5 TABLET ORAL at 05:21

## 2023-11-07 RX ADMIN — ATENOLOL 50 MILLIGRAM(S): 25 TABLET ORAL at 05:21

## 2023-11-07 RX ADMIN — HEPARIN SODIUM 5000 UNIT(S): 5000 INJECTION INTRAVENOUS; SUBCUTANEOUS at 19:12

## 2023-11-07 RX ADMIN — Medication 3 MILLILITER(S): at 04:38

## 2023-11-07 RX ADMIN — Medication 3 MILLILITER(S): at 16:00

## 2023-11-07 NOTE — DISCHARGE NOTE PROVIDER - NSDCFUSCHEDAPPT_GEN_ALL_CORE_FT
[facility]  VASCULAR [address]  Scheduled Appointment: 11/30/2023    [facility]  VASCULAR [address]  Scheduled Appointment: 01/30/2024    [facility]  [facility]  VASCULAR [address]  Scheduled Appointment: 01/30/2024

## 2023-11-07 NOTE — CONSULT NOTE ADULT - TIME-BASED
[age]F h/o HTN presenting s/p mechanical fall with laceration to forehead. Has full recollection of event, no LOC, ambualtory and got up under own strength. No neuro deficits appreciated. Pelvis stable and has been weight bearing. No other areas concerning for trauma/injury. Lac repair with dermabond, pt declined tetanus booster. Will get CT head and neck to r/o intracranial pathology. Stable ambulating with cane and daugther at bedside, comfortable taking home if w/u negative.

## 2023-11-07 NOTE — DISCHARGE NOTE PROVIDER - ATTENDING DISCHARGE PHYSICAL EXAMINATION:
GENERAL: NAD  HEAD:  Atraumatic, Normocephalic  EYES: EOMI,  conjunctiva and sclera clear  NECK: Supple  CHEST/LUNG: Coarse breath sounds B/L, normal respiratory effort   HEART: Regular rate and rhythm; Normal S1 S2, No murmurs, rubs, or gallops  ABDOMEN: Soft, Nontender, Nondistended; Bowel sounds present  EXTREMITIES:  2+ Peripheral Pulses, No edema. +LUE AVF  PSYCH: Awake and alert x 3  NEUROLOGY: non-focal  SKIN: Warm and dry

## 2023-11-07 NOTE — PROGRESS NOTE ADULT - PROBLEM SELECTOR PLAN 3
appreciate nephro recs  s/p sodium zirconium cyclosilicate in ED  c/w HD per renal
--CTA chest w/ possible colitis  --At this time pt w/o sx of colitis  --Will obtain CT A/P

## 2023-11-07 NOTE — CONSULT NOTE ADULT - ATTENDING COMMENTS
ESRD with dyspnea   pt was evaluated during HD session this am. BP and Blood flow during dialysis are acceptable   UF 2.5 L removed   will f/u on CTA if has Pulm edema and schedule for another UF session tomorrow   call vascular for fistula scabbing please
This is an 88-year-old male with significant past medical history of end-stage renal disease on dialysis who comes in for hypoxic respiratory failure likely secondary to pulmonary edema.  Pulmonary consulted for possible thoracentesis.    #Bilateral pleural effusion  #End-stage renal disease on dialysis    Patient seen and examined at bedside.  Does not appear under acute distress.  Has had dialysis for significant amount of time.  Ultrasound showed simple effusions bilaterally with left lung showing small pleural effusion and right side showing moderate pleural effusion with atelectatic lung within the frame.  There is no good window for thoracentesis.  Given the lack of symptoms and ultrasound, the risks is not outweigh the benefit at this time.  Patient can be reassessed as an outpatient if pleural effusion does not improve with dialysis.    Thank you for your consult.  Please call back if you have any further questions.

## 2023-11-07 NOTE — CONSULT NOTE ADULT - SUBJECTIVE AND OBJECTIVE BOX
CHIEF COMPLAINT: bilat pleural effusions    HPI:  88 -year-old male with NIDDM2, HTN, HLD, ESRD on HD MWF via LUE forearm AVF p/f SOB and admitted for hypervolemia 2/2 ESRD. Patient has since been having improvement with his dyspnea, requiring NC but now comfortable on RA. D-dime elevated and CTA ordered, showing diffuse GGOs primarily centrilobular suggestive of pulmonary edema, w/ bilateral pleural effusions - moderate R and small L. No PE present.    Patient improving and now on RA. Pulmonary consulted for pleural effusions.    PAST MEDICAL & SURGICAL HISTORY:  Diabetes      HTN (hypertension)  HD MWF      ESRD (end stage renal disease)      HLD (hyperlipidemia)      AVF (arteriovenous fistula)          FAMILY HISTORY:  FH: diabetes mellitus (Mother)        SOCIAL HISTORY:  Smoking: [ ] Never Smoked [ ] Former Smoker (__ packs x ___ years) [ ] Current Smoker  (__ packs x ___ years)  Substance Use: [ ] Never Used [ ] Used ____  EtOH Use:  Occupation:  Recent Travel:  Country of Birth:  Advance Directives:    Allergies    No Known Allergies    Intolerances        HOME MEDICATIONS:    REVIEW OF SYSTEMS:  Constitutional: [x] negative [ ] fevers [ ] chills [ ] weight loss [ ] weight gain  HEENT: [x] negative [ ] dry eyes [ ] eye irritation [ ] postnasal drip [ ] nasal congestion  CV: [x] negative  [ ] chest pain [ ] orthopnea [ ] palpitations [ ] murmur  Resp: [x] negative [ ] cough [ ] shortness of breath [ ] dyspnea [ ] wheezing [ ] sputum [ ] hemoptysis  GI: [x] negative [ ] nausea [ ] vomiting [ ] diarrhea [ ] constipation [ ] abd pain [ ] dysphagia   : [x] negative [ ] dysuria [ ] nocturia [ ] hematuria [ ] increased urinary frequency  Musculoskeletal: [x] negative [ ] back pain [ ] myalgias [ ] arthralgias [ ] fracture  Skin: [x] negative [ ] rash [ ] itch  Neurological: [x] negative [ ] headache [ ] dizziness [ ] syncope [ ] weakness [ ] numbness  Psychiatric: [x] negative [ ] anxiety [ ] depression  Endocrine: [x] negative [ ] diabetes [ ] thyroid problem  Hematologic/Lymphatic: [x] negative [ ] anemia [ ] bleeding problem  Allergic/Immunologic: [x] negative [ ] itchy eyes [ ] nasal discharge [ ] hives [ ] angioedema  [x] All other systems negative  [ ] Unable to assess ROS because ________    OBJECTIVE:  ICU Vital Signs Last 24 Hrs  T(C): 36.6 (07 Nov 2023 11:50), Max: 36.9 (06 Nov 2023 20:19)  T(F): 97.8 (07 Nov 2023 11:50), Max: 98.5 (06 Nov 2023 20:19)  HR: 77 (07 Nov 2023 11:50) (70 - 94)  BP: 145/70 (07 Nov 2023 11:50) (136/55 - 160/63)  BP(mean): --  ABP: --  ABP(mean): --  RR: 18 (07 Nov 2023 11:50) (18 - 18)  SpO2: 98% (07 Nov 2023 09:24) (90% - 98%)    O2 Parameters below as of 07 Nov 2023 11:50  Patient On (Oxygen Delivery Method): room air              11-06 @ 07:01  -  11-07 @ 07:00  --------------------------------------------------------  IN: 1105 mL / OUT: 3300 mL / NET: -2195 mL      CAPILLARY BLOOD GLUCOSE      POCT Blood Glucose.: 126 mg/dL (07 Nov 2023 13:14)      PHYSICAL EXAM:  General:   HEENT: MMM, PERRLA, no oropharyngeal or perioral lesions  Neck: supple, no LAD  Respiratory:   Cardiovascular: RRR, no MRG  Abdomen: NTND  Extremities: no LE edema, warm  Neurological: AOx3, no gross deficits    HOSPITAL MEDICATIONS:      amLODIPine   Tablet 10 milliGRAM(s) Oral daily  atenolol  Tablet 50 milliGRAM(s) Oral daily    dextrose 50% Injectable 25 Gram(s) IV Push once  dextrose 50% Injectable 12.5 Gram(s) IV Push once  dextrose 50% Injectable 25 Gram(s) IV Push once  dextrose Oral Gel 15 Gram(s) Oral once PRN  glucagon  Injectable 1 milliGRAM(s) IntraMuscular once  insulin lispro (ADMELOG) corrective regimen sliding scale   SubCutaneous three times a day before meals  insulin lispro (ADMELOG) corrective regimen sliding scale   SubCutaneous at bedtime  simvastatin 20 milliGRAM(s) Oral at bedtime    albuterol/ipratropium for Nebulization 3 milliLiter(s) Nebulizer every 6 hours      senna 1 Tablet(s) Oral at bedtime        calcium acetate 667 milliGRAM(s) Oral three times a day with meals  dextrose 5%. 1000 milliLiter(s) IV Continuous <Continuous>  dextrose 5%. 1000 milliLiter(s) IV Continuous <Continuous>  sodium chloride 0.9% Bolus. 100 milliLiter(s) IV Bolus every 5 minutes PRN      chlorhexidine 2% Cloths 1 Application(s) Topical daily        LABS:                        11.4   4.66  )-----------( 136      ( 07 Nov 2023 06:47 )             36.5     Hgb Trend: 11.4<--, 11.3<--, 12.7<--, 11.8<--  11-07    140  |  97<L>  |  35<H>  ----------------------------<  82  4.0   |  26  |  9.69<H>    Ca    9.5      07 Nov 2023 06:47  Phos  4.6     11-07  Mg     2.20     11-07      Creatinine Trend: 9.69<--, 12.16<--, 10.94<--, 9.43<--  PT/INR - ( 06 Nov 2023 12:47 )   PT: 11.3 sec;   INR: 1.00 ratio         PTT - ( 07 Nov 2023 06:47 )  PTT:38.9 sec  Urinalysis Basic - ( 07 Nov 2023 06:47 )    Color: x / Appearance: x / SG: x / pH: x  Gluc: 82 mg/dL / Ketone: x  / Bili: x / Urobili: x   Blood: x / Protein: x / Nitrite: x   Leuk Esterase: x / RBC: x / WBC x   Sq Epi: x / Non Sq Epi: x / Bacteria: x        Venous Blood Gas:  11-06 @ 04:00  7.39/48/54/29/81.3  VBG Lactate: 1.6      MICROBIOLOGY:     RADIOLOGY:  [x] Reviewed and interpreted by me    PULMONARY FUNCTION TESTS:    EKG: CHIEF COMPLAINT: bilat pleural effusions    HPI:  88 -year-old male with NIDDM2, HTN, HLD, ESRD on HD MWF via LUE forearm AVF p/f SOB and admitted for hypervolemia 2/2 ESRD. Patient has since been having improvement with his dyspnea, requiring NC but now comfortable on RA. D-dime elevated and CTA ordered, showing diffuse GGOs primarily centrilobular suggestive of pulmonary edema, w/ bilateral pleural effusions - moderate R and small L. No PE present.    Patient improving and now on RA, breathing comfortably. Pulmonary consulted for pleural effusions.    PAST MEDICAL & SURGICAL HISTORY:  Diabetes      HTN (hypertension)  HD MWF      ESRD (end stage renal disease)      HLD (hyperlipidemia)      AVF (arteriovenous fistula)          FAMILY HISTORY:  FH: diabetes mellitus (Mother)            Allergies    No Known Allergies    Intolerances        HOME MEDICATIONS:    REVIEW OF SYSTEMS:  Constitutional: [x] negative [ ] fevers [ ] chills [ ] weight loss [ ] weight gain  HEENT: [x] negative [ ] dry eyes [ ] eye irritation [ ] postnasal drip [ ] nasal congestion  CV: [x] negative  [ ] chest pain [ ] orthopnea [ ] palpitations [ ] murmur  Resp: [x] negative [ ] cough [ ] shortness of breath [ ] dyspnea [ ] wheezing [ ] sputum [ ] hemoptysis  GI: [x] negative [ ] nausea [ ] vomiting [ ] diarrhea [ ] constipation [ ] abd pain [ ] dysphagia   : [x] negative [ ] dysuria [ ] nocturia [ ] hematuria [ ] increased urinary frequency  Musculoskeletal: [x] negative [ ] back pain [ ] myalgias [ ] arthralgias [ ] fracture  Skin: [x] negative [ ] rash [ ] itch  Neurological: [x] negative [ ] headache [ ] dizziness [ ] syncope [ ] weakness [ ] numbness  Psychiatric: [x] negative [ ] anxiety [ ] depression  Endocrine: [x] negative [ ] diabetes [ ] thyroid problem  Hematologic/Lymphatic: [x] negative [ ] anemia [ ] bleeding problem  Allergic/Immunologic: [x] negative [ ] itchy eyes [ ] nasal discharge [ ] hives [ ] angioedema  [x] All other systems negative  [ ] Unable to assess ROS because ________    OBJECTIVE:  ICU Vital Signs Last 24 Hrs  T(C): 36.6 (07 Nov 2023 11:50), Max: 36.9 (06 Nov 2023 20:19)  T(F): 97.8 (07 Nov 2023 11:50), Max: 98.5 (06 Nov 2023 20:19)  HR: 77 (07 Nov 2023 11:50) (70 - 94)  BP: 145/70 (07 Nov 2023 11:50) (136/55 - 160/63)  BP(mean): --  ABP: --  ABP(mean): --  RR: 18 (07 Nov 2023 11:50) (18 - 18)  SpO2: 98% (07 Nov 2023 09:24) (90% - 98%)    O2 Parameters below as of 07 Nov 2023 11:50  Patient On (Oxygen Delivery Method): room air              11-06 @ 07:01  -  11-07 @ 07:00  --------------------------------------------------------  IN: 1105 mL / OUT: 3300 mL / NET: -2195 mL      CAPILLARY BLOOD GLUCOSE      POCT Blood Glucose.: 126 mg/dL (07 Nov 2023 13:14)      PHYSICAL EXAM:  General: NAD  HEENT: MMM, PERRLA, no oropharyngeal or perioral lesions  Neck: supple, no LAD  Respiratory: CTAB   Cardiovascular: RRR, no MRG  Abdomen: NTND  Extremities: no LE edema, warm, LUE fistula  Neurological: AOx3, no gross deficits    HOSPITAL MEDICATIONS:      amLODIPine   Tablet 10 milliGRAM(s) Oral daily  atenolol  Tablet 50 milliGRAM(s) Oral daily    dextrose 50% Injectable 25 Gram(s) IV Push once  dextrose 50% Injectable 12.5 Gram(s) IV Push once  dextrose 50% Injectable 25 Gram(s) IV Push once  dextrose Oral Gel 15 Gram(s) Oral once PRN  glucagon  Injectable 1 milliGRAM(s) IntraMuscular once  insulin lispro (ADMELOG) corrective regimen sliding scale   SubCutaneous three times a day before meals  insulin lispro (ADMELOG) corrective regimen sliding scale   SubCutaneous at bedtime  simvastatin 20 milliGRAM(s) Oral at bedtime    albuterol/ipratropium for Nebulization 3 milliLiter(s) Nebulizer every 6 hours      senna 1 Tablet(s) Oral at bedtime        calcium acetate 667 milliGRAM(s) Oral three times a day with meals  dextrose 5%. 1000 milliLiter(s) IV Continuous <Continuous>  dextrose 5%. 1000 milliLiter(s) IV Continuous <Continuous>  sodium chloride 0.9% Bolus. 100 milliLiter(s) IV Bolus every 5 minutes PRN      chlorhexidine 2% Cloths 1 Application(s) Topical daily        LABS:                        11.4   4.66  )-----------( 136      ( 07 Nov 2023 06:47 )             36.5     Hgb Trend: 11.4<--, 11.3<--, 12.7<--, 11.8<--  11-07    140  |  97<L>  |  35<H>  ----------------------------<  82  4.0   |  26  |  9.69<H>    Ca    9.5      07 Nov 2023 06:47  Phos  4.6     11-07  Mg     2.20     11-07      Creatinine Trend: 9.69<--, 12.16<--, 10.94<--, 9.43<--  PT/INR - ( 06 Nov 2023 12:47 )   PT: 11.3 sec;   INR: 1.00 ratio         PTT - ( 07 Nov 2023 06:47 )  PTT:38.9 sec  Urinalysis Basic - ( 07 Nov 2023 06:47 )    Color: x / Appearance: x / SG: x / pH: x  Gluc: 82 mg/dL / Ketone: x  / Bili: x / Urobili: x   Blood: x / Protein: x / Nitrite: x   Leuk Esterase: x / RBC: x / WBC x   Sq Epi: x / Non Sq Epi: x / Bacteria: x        Venous Blood Gas:  11-06 @ 04:00  7.39/48/54/29/81.3  VBG Lactate: 1.6      MICROBIOLOGY:     RADIOLOGY:  [x] Reviewed and interpreted by me    PULMONARY FUNCTION TESTS:    EKG:

## 2023-11-07 NOTE — DISCHARGE NOTE PROVIDER - NSDCMRMEDTOKEN_GEN_ALL_CORE_FT
amLODIPine 10 mg oral tablet: 1 tab(s) orally once a day  atenolol 50 mg oral tablet: 1 tab(s) orally once a day  calcium acetate 667 mg oral tablet: 1 tab(s) orally 3 times a day (with meals) with meals  Januvia 25 mg oral tablet: 1 tab(s) orally once a day  Senna 8.6 mg oral tablet: 1 tab(s) orally once a day (at bedtime)  simvastatin 20 mg oral tablet: 1 tab(s) orally once a day (at bedtime)   amLODIPine 5 mg oral tablet: 1 tab(s) orally once a day  atenolol 50 mg oral tablet: 1 tab(s) orally once a day  calcium acetate 667 mg oral tablet: 1 tab(s) orally 3 times a day (with meals) with meals  Januvia 25 mg oral tablet: 1 tab(s) orally once a day  Senna 8.6 mg oral tablet: 1 tab(s) orally once a day (at bedtime)  simvastatin 20 mg oral tablet: 1 tab(s) orally once a day (at bedtime)

## 2023-11-07 NOTE — CONSULT NOTE ADULT - TIME BILLING
Medical management as above, review of results/records, discussion with patient and primary team.  Encounter time excludes time spent teaching resident/fellow.

## 2023-11-07 NOTE — DISCHARGE NOTE PROVIDER - NSDCCPCAREPLAN_GEN_ALL_CORE_FT
PRINCIPAL DISCHARGE DIAGNOSIS  Diagnosis: Acute respiratory failure with hypoxia  Assessment and Plan of Treatment: You presented to the hospital with shortness of breath. This was thought to be due to excess fluid. CT chest showed some fluid in your lungs. You were continued on dialysis with improvement in your breathing. You experienced one episode of hypotension with dialysis. For this reason, your blood pressure medication (amlodipine) was decreased from 10mg daily to 5mg daily. Please continue to take your medications as prescribed following your discharge. Please return to the hospital should you experience any new or worsening symptoms       
not applicable (Male)

## 2023-11-07 NOTE — DIETITIAN INITIAL EVALUATION ADULT - ORAL INTAKE PTA/DIET HISTORY
Pt lives at home with son and daughter. Pt reports normally good appetite PTA, until excessive coughing began. Reports weight loss initially when he started HD in 2021. No supplements/Vitamins taken PTA.

## 2023-11-07 NOTE — DISCHARGE NOTE PROVIDER - DETAILS OF MALNUTRITION DIAGNOSIS/DIAGNOSES
This patient has been assessed with a concern for Malnutrition and was treated during this hospitalization for the following Nutrition diagnosis/diagnoses:     -  11/07/2023: Moderate protein-calorie malnutrition

## 2023-11-07 NOTE — CONSULT NOTE ADULT - ASSESSMENT
88 -year-old male with NIDDM2, HTN, HLD, ESRD on HD MWF via LUE forearm AVF admitted for hypoxic respiratory failure 2/2 volume overload improving with HD. Pulm c/f bilat pleural effusions.    #bilateral pleural effusions  #pulm edema  #ESRD HD    - POCUS showing 88 -year-old male with NIDDM2, HTN, HLD, ESRD on HD MWF via LUE forearm AVF admitted for hypoxic respiratory failure 2/2 volume overload improving with HD. Pulm c/f bilat pleural effusions.    #bilateral pleural effusions  #pulm edema  #ESRD HD    - POCUS showing  - patient improving and now on RA, and burden of pleural effusions not necessarily significant, would not proceed at this time with thoracentesis and instead manage conservatively with continued HD    NOTE INCOMPLETE* 88 -year-old male with NIDDM2, HTN, HLD, ESRD on HD MWF via LUE forearm AVF admitted for hypoxic respiratory failure 2/2 volume overload improving with HD. Pulm c/f bilat pleural effusions.    #bilateral pleural effusions  #pulm e  #ESRD HD    - POCUS showing  - patient improving and now on RA, and burden of pleural effusions not necessarily significant, would not proceed at this time with thoracentesis and instead manage conservatively with continued HD    NOTE INCOMPLETE* 88 -year-old male with NIDDM2, HTN, HLD, ESRD on HD MWF via LUE forearm AVF admitted for hypoxic respiratory failure 2/2 volume overload improving with HD. Pulm c/f bilat pleural effusions.    #bilateral pleural effusions  #pulm edema  #ESRD HD    - POCUS showing bilateral simple pleural effusions without tappable pocket  - as patient improving and now on room air without tappable pleural effusions, would not proceed at this time with thoracentesis and instead manage conservatively  - can be reassessed as an outpatient if pleural effusion does not improve with dialysis.    Pulmonary to sign-off at this time. Thank you for this consult. Please call back with any questions.     Joe Amaya MD  Fellow, Pulmonary-Critical Care

## 2023-11-07 NOTE — DISCHARGE NOTE PROVIDER - CARE PROVIDERS DIRECT ADDRESSES
,[email or web address]

## 2023-11-07 NOTE — PROGRESS NOTE ADULT - PROBLEM SELECTOR PLAN 4
plan as above  c/w calcium acetate w/meals  call VSx in AM for eval of fistula/?need for imaging    #anemia  likely secondary to AOCKD  at goal  monitor/trend    #thrombocytopenia  -unclear etiology, likely 2/2 ESRD monitor/trend
monitor on tele for now  trop stable, suspect 2/2 renal dysfunction  Serial EKGs

## 2023-11-07 NOTE — CONSULT NOTE ADULT - ASSESSMENT
ASSESSMENT: 88 year-old male with NIDDM2, HTN, HLD, ESRD on HD MWF via LUE forearm AVF (last HD Friday, completed session), presenting from home with 1.5 weeks of dry cough, shortness of breath, orthopnea. Vascular surgery consulted for evaluation of AVF. Patient noted to have scabbing over fistula site. There has been no difficulty accessing the fistula. Patient recently saw Dr. Adams in the office on 10/24.     RECS:  -No acute surgical intervention  -Continue to stick away from affected skin during dialysis   -Follow up outpatient with Dr. Adams   -Discussed with Dr. Adams     Vascular Surgery, [id number]

## 2023-11-07 NOTE — DIETITIAN INITIAL EVALUATION ADULT - PROBLEM SELECTOR PLAN 4
plan as above  c/w calcium acetate w/meals  call VSx in AM for eval of fistula/?need for imaging    #anemia  likely secondary to AOCKD  at goal  monitor/trend

## 2023-11-07 NOTE — PROGRESS NOTE ADULT - PROBLEM SELECTOR PLAN 7
hold sitagliptin while inpatient  FS QAC/HS w/ISS  A1C 5.2
c/w amlodipine and atenolol with hold parameters

## 2023-11-07 NOTE — DIETITIAN INITIAL EVALUATION ADULT - PERTINENT MEDS FT
MEDICATIONS  (STANDING):  albuterol/ipratropium for Nebulization 3 milliLiter(s) Nebulizer every 6 hours  amLODIPine   Tablet 10 milliGRAM(s) Oral daily  atenolol  Tablet 50 milliGRAM(s) Oral daily  calcium acetate 667 milliGRAM(s) Oral three times a day with meals  chlorhexidine 2% Cloths 1 Application(s) Topical daily  dextrose 5%. 1000 milliLiter(s) (100 mL/Hr) IV Continuous <Continuous>  dextrose 5%. 1000 milliLiter(s) (50 mL/Hr) IV Continuous <Continuous>  dextrose 50% Injectable 25 Gram(s) IV Push once  dextrose 50% Injectable 12.5 Gram(s) IV Push once  dextrose 50% Injectable 25 Gram(s) IV Push once  glucagon  Injectable 1 milliGRAM(s) IntraMuscular once  heparin   Injectable 5000 Unit(s) SubCutaneous every 12 hours  insulin lispro (ADMELOG) corrective regimen sliding scale   SubCutaneous three times a day before meals  insulin lispro (ADMELOG) corrective regimen sliding scale   SubCutaneous at bedtime  senna 1 Tablet(s) Oral at bedtime  simvastatin 20 milliGRAM(s) Oral at bedtime    MEDICATIONS  (PRN):  dextrose Oral Gel 15 Gram(s) Oral once PRN Blood Glucose LESS THAN 70 milliGRAM(s)/deciliter  sodium chloride 0.9% Bolus. 100 milliLiter(s) IV Bolus every 5 minutes PRN SBP LESS THAN or EQUAL to 90 mmHg

## 2023-11-07 NOTE — DIETITIAN INITIAL EVALUATION ADULT - OTHER INFO
Medical course: Per chart 88 year-old male with NIDDM2, HTN, HLD, ESRD on HD MWF via LUE forearm AVF (last HD Friday, completed session), presenting from home with 1.5weeks of dry cough, shortness of breath, orthopnea a/w AHRF.     Nutrition interview: Pt A&OX4. No recent episodes of nausea, vomiting, diarrhea or constipation, last BM noted on 11/5 per RN flowsheets. Denies any chewing/swallowing difficulties. No known food allergies. Stated UBW: 135lb, same as current weight. Intake is ~50% per Pt. Has never tried oral nutrition supplement, amenable to trial orgain. Feeding skills: independent.     Admitted with hyperkalemia, now within normal limits s/p lokelma and HD. Medication notable for calcium acetate for phosphate binder. Hx of DM, A1c 5.2%, POCT  mg/dL.     Educated Pt on increased needs while receiving HD. Declined education regarding high K+ and phos foods.

## 2023-11-07 NOTE — DISCHARGE NOTE PROVIDER - HOSPITAL COURSE
88 -year-old male with NIDDM2, HTN, HLD, ESRD on HD MWF via LUE forearm AVF (last HD 11/3, completed session), presenting from home with 1.5weeks of dry cough, shortness of breath, orthopnea a/w AHRF suspect 2/2 fluid overload. D-dimer elevated on admission, empirically started on heparin gtt 2/2 c/f PE. CTA Chest w/o PE but w/ mod right and small left pleural effusions. Heparin gtt discontinued. Continued on HD w/ improvement in SOB. Course c/b episode of hypotension w/ dialysis, amlodipine dec to 5mg QD. Hemodynamically stable for discharge.

## 2023-11-07 NOTE — DIETITIAN INITIAL EVALUATION ADULT - PERTINENT LABORATORY DATA
11-07    140  |  97<L>  |  35<H>  ----------------------------<  82  4.0   |  26  |  9.69<H>    Ca    9.5      07 Nov 2023 06:47  Phos  4.6     11-07  Mg     2.20     11-07    POCT Blood Glucose.: 94 mg/dL (11-07-23 @ 14:27)  A1C with Estimated Average Glucose Result: 5.2 % (11-06-23 @ 04:00)  A1C with Estimated Average Glucose Result: 5.2 % (01-26-23 @ 06:22)

## 2023-11-07 NOTE — DISCHARGE NOTE PROVIDER - NSDCQMSTROKE_NEU_ALL_CORE
06/20/17 1435   Rehab Treatment   Discipline physical therapist   Rehab Evaluation   Evaluation Not Performed (Pt admitted w/ vaginal bleeding from ECF w/ bed hold.  Possible plan for hysterctomy.  Plan to attempt PT evaluation after possible surgery.  )   Recommendation   PT - Next Appointment 06/21/17      No

## 2023-11-07 NOTE — CONSULT NOTE ADULT - SUBJECTIVE AND OBJECTIVE BOX
HISTORY OF PRESENT ILLNESS:  HPI: 88 year-old male with NIDDM2, HTN, HLD, ESRD on HD MWF via LUE forearm AVF (last HD Friday, completed session), presenting from home with 1.5 weeks of dry cough, shortness of breath, orthopnea. He reports some occasional wheezing and some anterior chest pain that is associated only with coughing. He was prescribed benzonatate on 10/22 without relief.  He notes possibly drinking too much fluids of recent. He denies any history of VTE. He notes recent travel, drove to [place] approximately 1 month ago, denies any hormone use. He denies any fevers or chills. No recent sick contacts.     Vascular surgery consulted for evaluation of AVF. Patient noted to have scabbing over fistula site. There has been no difficulty accessing the fistula. Patient recently saw Dr. Adams in the office on 10/24.       PAST MEDICAL HISTORY: Diabetes    HTN (hypertension)    ESRD (end stage renal disease)    HLD (hyperlipidemia)        PAST SURGICAL HISTORY: AVF (arteriovenous fistula)    FAMILY HISTORY: FH: diabetes mellitus (Mother)    ALLERGIES: No Known Allergies    VITAL SIGNS:  T(C): 36.4 (07 Nov 2023 14:05), Max: 36.9 (06 Nov 2023 20:19)  T(F): 97.6 (07 Nov 2023 14:05), Max: 98.5 (06 Nov 2023 20:19)  HR: 70 (07 Nov 2023 16:46) (70 - 94)  BP: 119/73 (07 Nov 2023 14:05) (119/73 - 160/63)  BP(mean): --  ABP: --  ABP(mean): --  RR: 16 (07 Nov 2023 14:05) (16 - 18)  SpO2: 98% (07 Nov 2023 16:46) (90% - 98%)    O2 Parameters below as of 07 Nov 2023 14:05  Patient On (Oxygen Delivery Method): room air      PHYSICAL EXAM:  Gen: NAD  Neuro: A&Ox3  Resp: no increased WOB, satting well on RA  Cardiac: appears well perfused, extremities warm  Abd: soft, nondistended  Extremities: LUE fistula with palpable thrill, scabbing noted along fistula

## 2023-11-07 NOTE — PROGRESS NOTE ADULT - TIME BILLING
reviewing laboratory data, consultants' recommendations, documentation in [place], performing medically appropriate examinations/evaluations, discussion with patient/family/RN/ACP/Residents and interdisciplinary staff (such as , social workers, etc), counseling patient/family/care giver, ordering medically appropriate medication, tests, or procedures

## 2023-11-07 NOTE — DISCHARGE NOTE PROVIDER - CARE PROVIDER_API CALL
Savage Adams  Vascular Surgery  [address], [address]  [place] [address]  Phone: [phone]  Fax: [phone]  Follow Up Time:     Taurus Feliciano  Internal Medicine  [address] [place] [address]  Phone: [phone]  Fax: [phone]  Follow Up Time:     Jorje Abreu  Nephrology  [address] [place] [address], [address]  [place] [address]  Phone: [phone]  Fax: [phone]  Follow Up Time:

## 2023-11-07 NOTE — DIETITIAN INITIAL EVALUATION ADULT - PROBLEM SELECTOR PLAN 1
unclear etiology, possibly related to fluid overload given elevated pro-BNP, report of possibly increased fluid intake of recent  echo 2022 without evidence of HF, no known history of prior heart disease  CXR as above  check RVP  check procal  repeat VBG with AM labs  check d-dimer with AM labs as per tele tech review, documented desat to 87% earlier (subsequently placed on NC), low concern for VTE (only risk factor is travel to [place] 1 month ago)  monitor on continuous pulse ox, O2 PRN

## 2023-11-07 NOTE — DIETITIAN INITIAL EVALUATION ADULT - ADD RECOMMEND
1) Recommend renal diet (d/c DASH/TLC, CSTCHO diet)   2)  to provide Orgain 1x/day (220 kcal, 16 gm pro)   3) Monitor weights, labs, BM's, skin integrity, p.o. intake.   4) Encourage PO intake and honor food preferences as able.   5) Obtain pre/post HD weights

## 2023-11-08 ENCOUNTER — TRANSCRIPTION ENCOUNTER (OUTPATIENT)
Age: 88
End: 2023-11-08

## 2023-11-08 VITALS
HEART RATE: 76 BPM | SYSTOLIC BLOOD PRESSURE: 119 MMHG | RESPIRATION RATE: 17 BRPM | TEMPERATURE: 98 F | DIASTOLIC BLOOD PRESSURE: 61 MMHG | OXYGEN SATURATION: 100 %

## 2023-11-08 LAB
ANION GAP SERPL CALC-SCNC: 22 MMOL/L — HIGH (ref 7–14)
ANION GAP SERPL CALC-SCNC: 22 MMOL/L — HIGH (ref 7–14)
BUN SERPL-MCNC: 53 MG/DL — HIGH (ref 7–23)
BUN SERPL-MCNC: 53 MG/DL — HIGH (ref 7–23)
CALCIUM SERPL-MCNC: 9.6 MG/DL — SIGNIFICANT CHANGE UP (ref 8.4–10.5)
CALCIUM SERPL-MCNC: 9.6 MG/DL — SIGNIFICANT CHANGE UP (ref 8.4–10.5)
CHLORIDE SERPL-SCNC: 97 MMOL/L — LOW (ref 98–107)
CHLORIDE SERPL-SCNC: 97 MMOL/L — LOW (ref 98–107)
CO2 SERPL-SCNC: 22 MMOL/L — SIGNIFICANT CHANGE UP (ref 22–31)
CO2 SERPL-SCNC: 22 MMOL/L — SIGNIFICANT CHANGE UP (ref 22–31)
CREAT SERPL-MCNC: 11.82 MG/DL — HIGH (ref 0.5–1.3)
CREAT SERPL-MCNC: 11.82 MG/DL — HIGH (ref 0.5–1.3)
EGFR: 4 ML/MIN/1.73M2 — LOW
EGFR: 4 ML/MIN/1.73M2 — LOW
GLUCOSE BLDC GLUCOMTR-MCNC: 102 MG/DL — HIGH (ref 70–99)
GLUCOSE BLDC GLUCOMTR-MCNC: 102 MG/DL — HIGH (ref 70–99)
GLUCOSE BLDC GLUCOMTR-MCNC: 79 MG/DL — SIGNIFICANT CHANGE UP (ref 70–99)
GLUCOSE BLDC GLUCOMTR-MCNC: 79 MG/DL — SIGNIFICANT CHANGE UP (ref 70–99)
GLUCOSE BLDC GLUCOMTR-MCNC: 87 MG/DL — SIGNIFICANT CHANGE UP (ref 70–99)
GLUCOSE BLDC GLUCOMTR-MCNC: 87 MG/DL — SIGNIFICANT CHANGE UP (ref 70–99)
GLUCOSE SERPL-MCNC: 71 MG/DL — SIGNIFICANT CHANGE UP (ref 70–99)
GLUCOSE SERPL-MCNC: 71 MG/DL — SIGNIFICANT CHANGE UP (ref 70–99)
HCT VFR BLD CALC: 37.6 % — LOW (ref 39–50)
HCT VFR BLD CALC: 37.6 % — LOW (ref 39–50)
HGB BLD-MCNC: 11.9 G/DL — LOW (ref 13–17)
HGB BLD-MCNC: 11.9 G/DL — LOW (ref 13–17)
MAGNESIUM SERPL-MCNC: 2.3 MG/DL — SIGNIFICANT CHANGE UP (ref 1.6–2.6)
MAGNESIUM SERPL-MCNC: 2.3 MG/DL — SIGNIFICANT CHANGE UP (ref 1.6–2.6)
MCHC RBC-ENTMCNC: 26.3 PG — LOW (ref 27–34)
MCHC RBC-ENTMCNC: 26.3 PG — LOW (ref 27–34)
MCHC RBC-ENTMCNC: 31.6 GM/DL — LOW (ref 32–36)
MCHC RBC-ENTMCNC: 31.6 GM/DL — LOW (ref 32–36)
MCV RBC AUTO: 83 FL — SIGNIFICANT CHANGE UP (ref 80–100)
MCV RBC AUTO: 83 FL — SIGNIFICANT CHANGE UP (ref 80–100)
NRBC # BLD: 0 /100 WBCS — SIGNIFICANT CHANGE UP (ref 0–0)
NRBC # BLD: 0 /100 WBCS — SIGNIFICANT CHANGE UP (ref 0–0)
NRBC # FLD: 0 K/UL — SIGNIFICANT CHANGE UP (ref 0–0)
NRBC # FLD: 0 K/UL — SIGNIFICANT CHANGE UP (ref 0–0)
PHOSPHATE SERPL-MCNC: 5.7 MG/DL — HIGH (ref 2.5–4.5)
PHOSPHATE SERPL-MCNC: 5.7 MG/DL — HIGH (ref 2.5–4.5)
PLATELET # BLD AUTO: 127 K/UL — LOW (ref 150–400)
PLATELET # BLD AUTO: 127 K/UL — LOW (ref 150–400)
POTASSIUM SERPL-MCNC: 4.2 MMOL/L — SIGNIFICANT CHANGE UP (ref 3.5–5.3)
POTASSIUM SERPL-MCNC: 4.2 MMOL/L — SIGNIFICANT CHANGE UP (ref 3.5–5.3)
POTASSIUM SERPL-SCNC: 4.2 MMOL/L — SIGNIFICANT CHANGE UP (ref 3.5–5.3)
POTASSIUM SERPL-SCNC: 4.2 MMOL/L — SIGNIFICANT CHANGE UP (ref 3.5–5.3)
RBC # BLD: 4.53 M/UL — SIGNIFICANT CHANGE UP (ref 4.2–5.8)
RBC # BLD: 4.53 M/UL — SIGNIFICANT CHANGE UP (ref 4.2–5.8)
RBC # FLD: 15.5 % — HIGH (ref 10.3–14.5)
RBC # FLD: 15.5 % — HIGH (ref 10.3–14.5)
SODIUM SERPL-SCNC: 141 MMOL/L — SIGNIFICANT CHANGE UP (ref 135–145)
SODIUM SERPL-SCNC: 141 MMOL/L — SIGNIFICANT CHANGE UP (ref 135–145)
WBC # BLD: 4.71 K/UL — SIGNIFICANT CHANGE UP (ref 3.8–10.5)
WBC # BLD: 4.71 K/UL — SIGNIFICANT CHANGE UP (ref 3.8–10.5)
WBC # FLD AUTO: 4.71 K/UL — SIGNIFICANT CHANGE UP (ref 3.8–10.5)
WBC # FLD AUTO: 4.71 K/UL — SIGNIFICANT CHANGE UP (ref 3.8–10.5)

## 2023-11-08 PROCEDURE — 99239 HOSP IP/OBS DSCHRG MGMT >30: CPT

## 2023-11-08 PROCEDURE — 90935 HEMODIALYSIS ONE EVALUATION: CPT | Mod: GC

## 2023-11-08 RX ORDER — AMLODIPINE BESYLATE 2.5 MG/1
1 TABLET ORAL
Qty: 30 | Refills: 0
Start: 2023-11-08 | End: 2023-12-07

## 2023-11-08 RX ORDER — AMLODIPINE BESYLATE 2.5 MG/1
5 TABLET ORAL DAILY
Refills: 0 | Status: DISCONTINUED | OUTPATIENT
Start: 2023-11-09 | End: 2023-11-08

## 2023-11-08 RX ORDER — AMLODIPINE BESYLATE 2.5 MG/1
1 TABLET ORAL
Qty: 0 | Refills: 0 | DISCHARGE

## 2023-11-08 RX ADMIN — HEPARIN SODIUM 5000 UNIT(S): 5000 INJECTION INTRAVENOUS; SUBCUTANEOUS at 05:45

## 2023-11-08 RX ADMIN — ATENOLOL 50 MILLIGRAM(S): 25 TABLET ORAL at 05:43

## 2023-11-08 RX ADMIN — Medication 667 MILLIGRAM(S): at 13:51

## 2023-11-08 RX ADMIN — AMLODIPINE BESYLATE 10 MILLIGRAM(S): 2.5 TABLET ORAL at 05:43

## 2023-11-08 RX ADMIN — Medication 3 MILLILITER(S): at 04:16

## 2023-11-08 RX ADMIN — Medication 667 MILLIGRAM(S): at 08:49

## 2023-11-08 RX ADMIN — Medication 667 MILLIGRAM(S): at 17:56

## 2023-11-08 RX ADMIN — Medication 3 MILLILITER(S): at 11:20

## 2023-11-08 RX ADMIN — HEPARIN SODIUM 5000 UNIT(S): 5000 INJECTION INTRAVENOUS; SUBCUTANEOUS at 17:56

## 2023-11-08 RX ADMIN — CHLORHEXIDINE GLUCONATE 1 APPLICATION(S): 213 SOLUTION TOPICAL at 13:54

## 2023-11-08 RX ADMIN — Medication 3 MILLILITER(S): at 16:18

## 2023-11-08 NOTE — PROGRESS NOTE ADULT - PROBLEM SELECTOR PLAN 1
--unclear etiology, possibly related to fluid overload given elevated pro-BNP  --echo 2022 without evidence of HF, no known history of prior heart disease  --CTA Chest w/o PE but w/ mod right and small left pleural effusions  --Pulm consulted per renal recs for possible thoracentesis   --d/c heparin gtt  --Duonebs Q6H  --Fluid removal w/ dialysis   --Weaned off NC, saturating well on RA
unclear etiology, possibly related to fluid overload given elevated pro-BNP  echo 2022 without evidence of HF, no known history of prior heart disease  CXR w/ Diffuse interstitial markings similar to compared to prior likely representing chronic pulmonary vascular changes, Unable to excluse acute pulmonary interstitial venous congestion   Procal 0.55  RVP negative  Resumed empirically on heparin gtt given elevated d-dimer 13,588  Duonebs Q6H  Fluid removal w/ dialysis   F/u CTA chest   monitor on continuous pulse ox, O2 PRN
Pt with ESRD on HD. Pt receives HD TIW on MWF schedule via LUE AVF at [facility] under the supervision of Dr. Abreu. Pt is on dialysis due to long-standing HTN and DM2. Does not make urine. Last HD was on 11/6/23 with 2L PUF on 11/7. Labs reviewed. Pt appears clinically stable but still has crackles/rales on exam. Plan for HD today. Vascular notes reviewed. Monitor BP and labs. Dose meds as per HD.
will plan for UF session today as with pulm edema

## 2023-11-08 NOTE — DISCHARGE NOTE NURSING/CASE MANAGEMENT/SOCIAL WORK - NSDCVIVACCINE_GEN_ALL_CORE_FT
Tdap; 30-May-2015 23:48; Jennifer Beard (CAYETANO); [employer]; [id number]; IntraMuscular; Deltoid Right.; 0.5 milliLiter(s); VIS (VIS Published: 09-May-2013, VIS Presented: 30-May-2015);

## 2023-11-08 NOTE — PROGRESS NOTE ADULT - ATTENDING COMMENTS
pt was evaluated during HD session this am. Blood flow during dialysis are acceptable   BP became low at end of HD   would decrease Amlodipine to 5mg   appreciate pulm eval   appreciate vascular input regarding scab on fistula

## 2023-11-08 NOTE — DISCHARGE NOTE NURSING/CASE MANAGEMENT/SOCIAL WORK - PATIENT PORTAL LINK FT
You can access the FollowMyHealth Patient Portal offered by [facility] by registering at the following website: [email or web address]. By joining [employer]’s FollowMyHealth portal, you will also be able to view your health information using other applications (apps) compatible with our system.

## 2023-11-08 NOTE — PROGRESS NOTE ADULT - ASSESSMENT
Pt with ESRD on HD
88 -year-old male with NIDDM2, HTN, HLD, ESRD on HD MWF via LUE forearm AVF (last HD Friday, completed session), presenting from home with 1.5weeks of dry cough, shortness of breath, orthopnea a/w AHRF
Pt with ESRD on HD TIW
88 -year-old male with NIDDM2, HTN, HLD, ESRD on HD MWF via LUE forearm AVF (last HD Friday, completed session), presenting from home with 1.5weeks of dry cough, shortness of breath, orthopnea.

## 2023-11-08 NOTE — PROGRESS NOTE ADULT - PROBLEM SELECTOR PROBLEM 1
ESRD on hemodialysis
ESRD on hemodialysis
Acute respiratory failure with hypoxia
Acute respiratory failure with hypoxia

## 2023-11-08 NOTE — PROGRESS NOTE ADULT - PROBLEM SELECTOR PLAN 2
resolved
Patient with anemia in the setting of ESRD. Hgb stable at 11.9. Monitor hemoglobin.
monitor on tele for now  repeat EKG   trop stable, suspect 2/2 renal dysfunction
c/w calcium acetate w/meals  --Vasc surg eval for LUE AVF given mod thrill/ scabbing  --c/w dialysis per renal

## 2023-11-08 NOTE — PROGRESS NOTE ADULT - SUBJECTIVE AND OBJECTIVE BOX
[facility] DIVISION OF KIDNEY DISEASES AND HYPERTENSION   FOLLOW UP NOTE  --------------------------------------------------------------------------------  HPI: 88M with PMH of HTN, HLD, DM2, and ESRD on HD who presents to the ED complaining of difficulty breathing and cough. Nephrology following for management of ESRD/HD.    24 hour events/subjective: Pt. was seen and examined today. Feeling "alright", anxious to go home. Underwent HD on 11/6 with 2L PUF on 11/7. Plan for HD again today.     PAST HISTORY  --------------------------------------------------------------------------------  No significant changes to PMH, PSH, FHx, SHx, unless otherwise noted    ALLERGIES & MEDICATIONS  --------------------------------------------------------------------------------  Allergies  No Known Allergies    Intolerances    Standing Inpatient Medications  albuterol/ipratropium for Nebulization 3 milliLiter(s) Nebulizer every 6 hours  amLODIPine   Tablet 10 milliGRAM(s) Oral daily  atenolol  Tablet 50 milliGRAM(s) Oral daily  calcium acetate 667 milliGRAM(s) Oral three times a day with meals  chlorhexidine 2% Cloths 1 Application(s) Topical daily  dextrose 5%. 1000 milliLiter(s) IV Continuous <Continuous>  dextrose 5%. 1000 milliLiter(s) IV Continuous <Continuous>  dextrose 50% Injectable 25 Gram(s) IV Push once  dextrose 50% Injectable 12.5 Gram(s) IV Push once  dextrose 50% Injectable 25 Gram(s) IV Push once  glucagon  Injectable 1 milliGRAM(s) IntraMuscular once  heparin   Injectable 5000 Unit(s) SubCutaneous every 12 hours  insulin lispro (ADMELOG) corrective regimen sliding scale   SubCutaneous three times a day before meals  insulin lispro (ADMELOG) corrective regimen sliding scale   SubCutaneous at bedtime  senna 1 Tablet(s) Oral at bedtime  simvastatin 20 milliGRAM(s) Oral at bedtime    PRN Inpatient Medications  dextrose Oral Gel 15 Gram(s) Oral once PRN  sodium chloride 0.9% Bolus. 100 milliLiter(s) IV Bolus every 5 minutes PRN    REVIEW OF SYSTEMS  --------------------------------------------------------------------------------  All other systems were reviewed and are negative, except as noted.    VITALS/PHYSICAL EXAM  --------------------------------------------------------------------------------  T(C): 36.8 (11-08-23 @ 04:01), Max: 37 (11-07-23 @ 22:00)  HR: 78 (11-08-23 @ 04:16) (70 - 82)  BP: 131/56 (11-08-23 @ 04:01) (119/73 - 148/63)  RR: 17 (11-08-23 @ 04:01) (16 - 18)  SpO2: 95% (11-08-23 @ 04:16) (95% - 99%)  Wt(kg): --    Weight (kg): 61.2 (11-06-23 @ 16:11)    11-07-23 @ 07:01  -  11-08-23 @ 07:00  --------------------------------------------------------  IN: 400 mL / OUT: 2400 mL / NET: -2000 mL    Physical Exam:  Gen: NAD  HEENT: MMM  Pulm: Scattered rales B/L  CV: S1S2  Abd: Soft, +BS   Ext: trace B/L LE edema  Neuro: Awake  Skin: Warm and dry  Vascular access: LUE AVF, skin intact, moderate thrill felt, mild scabbing    LABS/STUDIES  --------------------------------------------------------------------------------              11.9   4.71  >-----------<  127      [11-08-23 @ 06:00]              37.6     140  |  97  |  35  ----------------------------<  82      [11-07-23 @ 06:47]  4.0   |  26  |  9.69        Ca     9.5     [11-07-23 @ 06:47]      Mg     2.20     [11-07-23 @ 06:47]      Phos  4.6     [11-07-23 @ 06:47]      PT/INR: PT 11.3 , INR 1.00       [11-06-23 @ 12:47]  PTT: 38.9       [11-07-23 @ 06:47]    Creatinine Trend:  SCr 9.69 [11-07 @ 06:47]  SCr 12.16 [11-06 @ 06:01]  SCr 10.94 [11-05 @ 10:30]  SCr 9.43 [10-26 @ 20:00]
[facility] DIVISION OF KIDNEY DISEASES AND HYPERTENSION -- FOLLOW UP NOTE  TERRI Fellow pager # [phone]  Nephrology office # [phone]  Available on [employer] teams--> Tesfaye Herrera  -----------------------------------------------------------------------------  Chief Complaint:/subjective:  s/p CTA with fluid o/vl   still with some dyspnea     24 hour events:            ALLERGIES & MEDICATIONS  --------------------------------------------------------------------------------  Allergies    No Known Allergies    Intolerances      Standing Inpatient Medications  albuterol/ipratropium for Nebulization 3 milliLiter(s) Nebulizer every 6 hours  amLODIPine   Tablet 10 milliGRAM(s) Oral daily  atenolol  Tablet 50 milliGRAM(s) Oral daily  calcium acetate 667 milliGRAM(s) Oral three times a day with meals  chlorhexidine 2% Cloths 1 Application(s) Topical daily  dextrose 5%. 1000 milliLiter(s) IV Continuous <Continuous>  dextrose 5%. 1000 milliLiter(s) IV Continuous <Continuous>  dextrose 50% Injectable 25 Gram(s) IV Push once  dextrose 50% Injectable 12.5 Gram(s) IV Push once  dextrose 50% Injectable 25 Gram(s) IV Push once  glucagon  Injectable 1 milliGRAM(s) IntraMuscular once  insulin lispro (ADMELOG) corrective regimen sliding scale   SubCutaneous three times a day before meals  insulin lispro (ADMELOG) corrective regimen sliding scale   SubCutaneous at bedtime  senna 1 Tablet(s) Oral at bedtime  simvastatin 20 milliGRAM(s) Oral at bedtime    PRN Inpatient Medications  dextrose Oral Gel 15 Gram(s) Oral once PRN  sodium chloride 0.9% Bolus. 100 milliLiter(s) IV Bolus every 5 minutes PRN        VITALS/PHYSICAL EXAM  --------------------------------------------------------------------------------  T(C): 36.6 (11-07-23 @ 11:50), Max: 36.9 (11-06-23 @ 20:19)  HR: 77 (11-07-23 @ 11:50) (70 - 94)  BP: 145/70 (11-07-23 @ 11:50) (136/55 - 160/63)  RR: 18 (11-07-23 @ 11:50) (18 - 18)  SpO2: 98% (11-07-23 @ 09:24) (90% - 98%)  Wt(kg): --    Weight (kg): 61.2 (11-06-23 @ 16:11)      11-06-23 @ 07:01  -  11-07-23 @ 07:00  --------------------------------------------------------  IN: 1105 mL / OUT: 3300 mL / NET: -2195 mL      Physical Exam:  	Gen NAD  	HEENT: no JVD  	Pulm: CTABL   	CV: S1S2,  	Abd: Soft,   	Ext:   - edema B/L LE   	Neuro: Awake and alert  	Skin: Warm and dry               LABS/STUDIES  --------------------------------------------------------------------------------              11.4   4.66  >-----------<  136      [11-07-23 @ 06:47]              36.5     Hemoglobin: 11.4 g/dL (11-07-23 @ 06:47)  Hemoglobin: 11.3 g/dL (11-07-23 @ 00:15)    Platelet Count - Automated: 136 K/uL (11-07-23 @ 06:47)  Platelet Count - Automated: 137 K/uL (11-07-23 @ 00:15)    140  |  97  |  35  ----------------------------<  82      [11-07-23 @ 06:47]  4.0   |  26  |  9.69        Ca     9.5     [11-07-23 @ 06:47]      Mg     2.20     [11-07-23 @ 06:47]      Phos  4.6     [11-07-23 @ 06:47]      PT/INR: PT 11.3 , INR 1.00       [11-06-23 @ 12:47]  PTT: 38.9       [11-07-23 @ 06:47]      Creatinine: 9.69 mg/dL (11-07-23 @ 06:47)  Creatinine: 12.16 mg/dL (11-06-23 @ 06:01)  Creatinine: 10.94 mg/dL (11-05-23 @ 10:30)    SODIUM TREND:  Sodium 140 [11-07 @ 06:47]  Sodium 141 [11-06 @ 06:01]  Sodium 143 [11-05 @ 10:30]  Sodium 140 [10-26 @ 20:00]        SCr 9.69 [11-07 @ 06:47]  SCr 12.16 [11-06 @ 06:01]  SCr 10.94 [11-05 @ 10:30]  SCr 9.43 [10-26 @ 20:00]    Urinalysis - [11-07-23 @ 06:47]      Color  / Appearance  / SG  / pH       Gluc 82 / Ketone   / Bili  / Urobili        Blood  / Protein  / Leuk Est  / Nitrite       RBC  / WBC  / Hyaline  / Gran  / Sq Epi  / Non Sq Epi  / Bacteria       PTH -- (Ca --)      [01-26-23 @ 06:22]   102  Vitamin D (25OH) 58.8      [01-26-23 @ 06:22]        
Karla Fernandez        Patient is a 88y old  Male who presents with a chief complaint of cough/SOB/orthopnea (2023 20:29)      SUBJECTIVE / OVERNIGHT EVENTS: No acute overnight events. This morning pt doing well. States SOB is present but improving, otherwise w/o complaints    MEDICATIONS  (STANDING):  amLODIPine   Tablet 10 milliGRAM(s) Oral daily  atenolol  Tablet 50 milliGRAM(s) Oral daily  calcium acetate 667 milliGRAM(s) Oral three times a day with meals  chlorhexidine 2% Cloths 1 Application(s) Topical daily  dextrose 5%. 1000 milliLiter(s) (100 mL/Hr) IV Continuous <Continuous>  dextrose 5%. 1000 milliLiter(s) (50 mL/Hr) IV Continuous <Continuous>  dextrose 50% Injectable 25 Gram(s) IV Push once  dextrose 50% Injectable 12.5 Gram(s) IV Push once  dextrose 50% Injectable 25 Gram(s) IV Push once  glucagon  Injectable 1 milliGRAM(s) IntraMuscular once  heparin  Infusion.  Unit(s)/Hr (11 mL/Hr) IV Continuous <Continuous>  insulin lispro (ADMELOG) corrective regimen sliding scale   SubCutaneous three times a day before meals  insulin lispro (ADMELOG) corrective regimen sliding scale   SubCutaneous at bedtime  senna 1 Tablet(s) Oral at bedtime  simvastatin 20 milliGRAM(s) Oral at bedtime    MEDICATIONS  (PRN):  dextrose Oral Gel 15 Gram(s) Oral once PRN Blood Glucose LESS THAN 70 milliGRAM(s)/deciliter  sodium chloride 0.9% Bolus. 100 milliLiter(s) IV Bolus every 5 minutes PRN SBP LESS THAN or EQUAL to 90 mmHg    Allergies    No Known Allergies    Intolerances        Vital Signs Last 24 Hrs  T(C): 36.4 (2023 11:58), Max: 36.6 (2023 20:54)  T(F): 97.6 (2023 11:58), Max: 97.8 (2023 20:54)  HR: 66 (2023 11:58) (64 - 72)  BP: 148/60 (2023 11:58) (143/64 - 178/71)  BP(mean): --  RR: 18 (2023 11:58) (18 - 20)  SpO2: 92% (2023 10:46) (92% - 100%)    Parameters below as of 2023 11:58  Patient On (Oxygen Delivery Method): nasal cannula  O2 Flow (L/min): 3    Daily     Daily Weight in k.5 (2023 09:45)  CAPILLARY BLOOD GLUCOSE      POCT Blood Glucose.: 90 mg/dL (2023 12:01)  POCT Blood Glucose.: 113 mg/dL (2023 10:50)  POCT Blood Glucose.: 83 mg/dL (2023 08:56)  POCT Blood Glucose.: 82 mg/dL (2023 05:36)  POCT Blood Glucose.: 81 mg/dL (2023 22:20)  POCT Blood Glucose.: 91 mg/dL (2023 20:43)    I&O's Summary    2023 07:01  -  2023 07:00  --------------------------------------------------------  IN: 240 mL / OUT: 0 mL / NET: 240 mL    2023 07:01  -  2023 17:27  --------------------------------------------------------  IN: 800 mL / OUT: 3300 mL / NET: -2500 mL        PHYSICAL EXAM:  GENERAL: NAD  HEAD:  Atraumatic, Normocephalic  EYES: EOMI,  conjunctiva and sclera clear  NECK: Supple  CHEST/LUNG: Coarse breath sounds B/L, normal respiratory effort   HEART: Regular rate and rhythm; Normal S1 S2, No murmurs, rubs, or gallops  ABDOMEN: Soft, Nontender, Nondistended; Bowel sounds present  EXTREMITIES:  2+ Peripheral Pulses, No edema. +LUE AVF  PSYCH: Awake and alert  NEUROLOGY: non-focal  SKIN: Warm and dry    LABS:                        12.7   4.73  )-----------( 113      ( 2023 06:01 )             43.1     Hgb Trend: 12.7<--, 11.8<--  11-06    141  |  101  |  54<H>  ----------------------------<  81  5.4<H>   |  20<L>  |  12.16<H>    Ca    10.0      2023 06:01  Phos  4.4     11  Mg     2.30         TPro  7.4  /  Alb  4.1  /  TBili  0.4  /  DBili  x   /  AST  22  /  ALT  10  /  AlkPhos  86      Creatinine Trend: 12.16<--, 10.94<--, 9.43<--  LIVER FUNCTIONS - ( 2023 10:30 )  Alb: 4.1 g/dL / Pro: 7.4 g/dL / ALK PHOS: 86 U/L / ALT: 10 U/L / AST: 22 U/L / GGT: x           PT/INR - ( 2023 12:47 )   PT: 11.3 sec;   INR: 1.00 ratio         PTT - ( 2023 12:47 )  PTT:36.8 sec      Urinalysis Basic - ( 2023 06:01 )    Color: x / Appearance: x / SG: x / pH: x  Gluc: 81 mg/dL / Ketone: x  / Bili: x / Urobili: x   Blood: x / Protein: x / Nitrite: x   Leuk Esterase: x / RBC: x / WBC x   Sq Epi: x / Non Sq Epi: x / Bacteria: x        RADIOLOGY & ADDITIONAL TESTS:    Imaging Personally Reviewed.    Consultant(s) Notes Reviewed.    Care Discussed with Consultants/Other Providers.      
Karla Fernandez        Patient is a 88y old  Male who presents with a chief complaint of cough/SOB/orthopnea (2023 12:47)      SUBJECTIVE / OVERNIGHT EVENTS: No acute overnight events. This morning pt doing well. States he feels "a lot better" and SOB is improving.  Denies fevers, chills, nausea, vomiting, chest pain, abdominal pain, constipation, diarrhea.     MEDICATIONS  (STANDING):  albuterol/ipratropium for Nebulization 3 milliLiter(s) Nebulizer every 6 hours  amLODIPine   Tablet 10 milliGRAM(s) Oral daily  atenolol  Tablet 50 milliGRAM(s) Oral daily  calcium acetate 667 milliGRAM(s) Oral three times a day with meals  chlorhexidine 2% Cloths 1 Application(s) Topical daily  dextrose 5%. 1000 milliLiter(s) (100 mL/Hr) IV Continuous <Continuous>  dextrose 5%. 1000 milliLiter(s) (50 mL/Hr) IV Continuous <Continuous>  dextrose 50% Injectable 25 Gram(s) IV Push once  dextrose 50% Injectable 12.5 Gram(s) IV Push once  dextrose 50% Injectable 25 Gram(s) IV Push once  glucagon  Injectable 1 milliGRAM(s) IntraMuscular once  insulin lispro (ADMELOG) corrective regimen sliding scale   SubCutaneous three times a day before meals  insulin lispro (ADMELOG) corrective regimen sliding scale   SubCutaneous at bedtime  senna 1 Tablet(s) Oral at bedtime  simvastatin 20 milliGRAM(s) Oral at bedtime    MEDICATIONS  (PRN):  dextrose Oral Gel 15 Gram(s) Oral once PRN Blood Glucose LESS THAN 70 milliGRAM(s)/deciliter  sodium chloride 0.9% Bolus. 100 milliLiter(s) IV Bolus every 5 minutes PRN SBP LESS THAN or EQUAL to 90 mmHg    Allergies    No Known Allergies    Intolerances        Vital Signs Last 24 Hrs  T(C): 36.6 (2023 11:50), Max: 36.9 (2023 20:19)  T(F): 97.8 (2023 11:50), Max: 98.5 (2023 20:19)  HR: 77 (2023 11:50) (70 - 94)  BP: 145/70 (2023 11:50) (136/55 - 160/63)  BP(mean): --  RR: 18 (2023 11:50) (18 - 18)  SpO2: 98% (2023 09:24) (90% - 98%)    Parameters below as of 2023 11:50  Patient On (Oxygen Delivery Method): room air      Daily     Daily Weight in k.6 (2023 11:50)  CAPILLARY BLOOD GLUCOSE      POCT Blood Glucose.: 126 mg/dL (2023 13:14)  POCT Blood Glucose.: 96 mg/dL (2023 11:08)  POCT Blood Glucose.: 82 mg/dL (2023 07:57)  POCT Blood Glucose.: 120 mg/dL (2023 22:04)  POCT Blood Glucose.: 110 mg/dL (2023 17:24)    I&O's Summary    2023 07:01  -  2023 07:00  --------------------------------------------------------  IN: 1105 mL / OUT: 3300 mL / NET: -2195 mL        PHYSICAL EXAM:  GENERAL: NAD  HEAD:  Atraumatic, Normocephalic  EYES: EOMI,  conjunctiva and sclera clear  NECK: Supple  CHEST/LUNG: Coarse breath sounds B/L, normal respiratory effort   HEART: Regular rate and rhythm; Normal S1 S2, No murmurs, rubs, or gallops  ABDOMEN: Soft, Nontender, Nondistended; Bowel sounds present  EXTREMITIES:  2+ Peripheral Pulses, No edema. +LUE AVF  PSYCH: Awake and alert  NEUROLOGY: non-focal  SKIN: Warm and dry      LABS:                        11.4   4.66  )-----------( 136      ( 2023 06:47 )             36.5     Hgb Trend: 11.4<--, 11.3<--, 12.7<--, 11.8<--  1107    140  |  97<L>  |  35<H>  ----------------------------<  82  4.0   |  26  |  9.69<H>    Ca    9.5      2023 06:47  Phos  4.6     11-07  Mg     2.20     11-07      Creatinine Trend: 9.69<--, 12.16<--, 10.94<--, 9.43<--    PT/INR - ( 2023 12:47 )   PT: 11.3 sec;   INR: 1.00 ratio         PTT - ( 2023 06:47 )  PTT:38.9 sec      Urinalysis Basic - ( 2023 06:47 )    Color: x / Appearance: x / SG: x / pH: x  Gluc: 82 mg/dL / Ketone: x  / Bili: x / Urobili: x   Blood: x / Protein: x / Nitrite: x   Leuk Esterase: x / RBC: x / WBC x   Sq Epi: x / Non Sq Epi: x / Bacteria: x        RADIOLOGY & ADDITIONAL TESTS:    Imaging Personally Reviewed.    Consultant(s) Notes Reviewed.    Care Discussed with Consultants/Other Providers.

## 2023-11-30 ENCOUNTER — APPOINTMENT (OUTPATIENT)
Dept: VASCULAR SURGERY | Facility: CLINIC | Age: 88
End: 2023-11-30

## 2024-01-30 ENCOUNTER — APPOINTMENT (OUTPATIENT)
Dept: VASCULAR SURGERY | Facility: CLINIC | Age: 89
End: 2024-01-30
Payer: MEDICARE

## 2024-01-30 DIAGNOSIS — Z99.2 END STAGE RENAL DISEASE: ICD-10-CM

## 2024-01-30 DIAGNOSIS — I77.0 ARTERIOVENOUS FISTULA, ACQUIRED: ICD-10-CM

## 2024-01-30 DIAGNOSIS — N18.6 END STAGE RENAL DISEASE: ICD-10-CM

## 2024-01-30 PROCEDURE — 93990 DOPPLER FLOW TESTING: CPT

## 2024-01-30 PROCEDURE — 99213 OFFICE O/P EST LOW 20 MIN: CPT

## 2024-01-30 NOTE — HISTORY OF PRESENT ILLNESS
[FreeTextEntry1] : Patient is an 89-year-old male with ESRD currently on hemodialysis via left upper extremity AV fistula who presents to the office today for routine surveillance.  Patient denies any present issues with cannulation or prolonged bleeding. [] : left radiocephalic fistula

## 2024-01-30 NOTE — ED PROVIDER NOTE - WR ORDER NAME 1

## 2024-01-30 NOTE — DISCUSSION/SUMMARY
[FreeTextEntry1] : 89-year-old male with ESRD currently on hemodialysis via left radiocephalic AV fistula.  In the office today, patient underwent duplex which demonstrates well-functioning AV fistula with mild to moderate stenosis at the proximal forearm due to a thickened valve.  Given the patient is without any present issues, we will continue to monitor.  Patient to follow-up in 3 to 4 months with repeat duplex.

## 2024-01-30 NOTE — PHYSICAL EXAM
[Normal] : normal rate, regular rhythm, normal S1/S2, no murmur [Thrill] : thrill [Pulsatile Thrill] : no pulsatile thrill [Aneurysm] : aneurysm [Bleeding] : no bleeding [2+] : left 2+ [de-identified] : Intact

## 2024-02-05 NOTE — PHYSICAL THERAPY INITIAL EVALUATION ADULT - ADDITIONAL COMMENTS
awaiting bed, no change
Pt reports living in a home 3 steps to enter 2 steps inside.  Pt resides with his daughter and her child.  Pt does not use durable medical equipment.  Pt is independent with all activities.

## 2024-02-22 LAB
HEMOLYSIS INDEX: 6 — SIGNIFICANT CHANGE UP
POTASSIUM SERPL-MCNC: 5.3 MMOL/L — SIGNIFICANT CHANGE UP (ref 3.5–5.3)
POTASSIUM SERPL-SCNC: 5.3 MMOL/L — SIGNIFICANT CHANGE UP (ref 3.5–5.3)

## 2024-04-30 NOTE — ED PROVIDER NOTE - NSCAREINITIATED _GEN_ER
Attempted to see patient today for wound consult. Patient's family is at bedside. Patient is lying in bed with eyes closed and not responsive to staff member at this time. He is currently on comfort measures. Patient's family states that they did not want to disturb him at the present time. Offered to family to revisit patient if they wanted to have the skin assessment completed. They verbalized understanding.  Family further stated that patient had a pressure injury with his last admission, has since been in a rehab / nursing facility and now has returned. She states that he has been getting treatment but it didn't seem to be bothering or hurting him.   Recommending skin care / protection for comfort at present time.   Elva Stockton RN     José Luis Henderson(Resident)

## 2024-06-04 ENCOUNTER — APPOINTMENT (OUTPATIENT)
Dept: VASCULAR SURGERY | Facility: CLINIC | Age: 89
End: 2024-06-04
Payer: MEDICARE

## 2024-06-04 PROCEDURE — 93990 DOPPLER FLOW TESTING: CPT

## 2024-06-04 PROCEDURE — G0506: CPT

## 2024-06-04 PROCEDURE — 99214 OFFICE O/P EST MOD 30 MIN: CPT | Mod: 25

## 2024-06-04 NOTE — PHYSICAL EXAM
[Thrill] : thrill [Pulsatile Thrill] : no pulsatile thrill [Aneurysm] : aneurysm [Bleeding] : no bleeding [Hand well perfused] : hand well perfused [Foot well perfused] : foot well perfused [Warm Extremities] : warm extremities [2+] : left 2+ [Normal] : coordination grossly intact, no focal deficits [de-identified] : Intact

## 2024-06-04 NOTE — DISCUSSION/SUMMARY
[FreeTextEntry1] : 89-year-old male with ESRD currently on hemodialysis via left radiocephalic AV fistula.  In the office today, patient underwent duplex which demonstrates well-functioning AV fistula with mild to moderate stenosis at the proximal forearm due to a thickened valve. ulceration much improved Given the patient is without any present issues, we will continue to monitor.  Patient to follow-up in 3 to 4 months with repeat duplex.

## 2024-08-02 ENCOUNTER — APPOINTMENT (OUTPATIENT)
Dept: OPHTHALMOLOGY | Facility: CLINIC | Age: 89
End: 2024-08-02

## 2024-08-13 NOTE — ED ADULT NURSE NOTE - IN THE PAST 12 MONTHS HAVE YOU USED DRUGS OTHER THAN THOSE REQUIRED FOR MEDICAL REASON?
No [Alert] : alert [No Acute Distress] : no acute distress [Normocephalic] : normocephalic [EOMI Bilateral] : EOMI bilateral [Clear tympanic membranes with bony landmarks and light reflex present bilaterally] : clear tympanic membranes with bony landmarks and light reflex present bilaterally  [Pink Nasal Mucosa] : pink nasal mucosa [Nonerythematous Oropharynx] : nonerythematous oropharynx [Supple, full passive range of motion] : supple, full passive range of motion [No Palpable Masses] : no palpable masses [Clear to Auscultation Bilaterally] : clear to auscultation bilaterally [Regular Rate and Rhythm] : regular rate and rhythm [Normal S1, S2 audible] : normal S1, S2 audible [No Murmurs] : no murmurs [+2 Femoral Pulses] : +2 femoral pulses [Soft] : soft [NonTender] : non tender [Non Distended] : non distended [Normoactive Bowel Sounds] : normoactive bowel sounds [No Hepatomegaly] : no hepatomegaly [No Splenomegaly] : no splenomegaly [Michael: _____] : Michael [unfilled] [Bilateral descended testes] : bilateral descended testes [No Abnormal Lymph Nodes Palpated] : no abnormal lymph nodes palpated [Normal Muscle Tone] : normal muscle tone [No Gait Asymmetry] : no gait asymmetry [No pain or deformities with palpation of bone, muscles, joints] : no pain or deformities with palpation of bone, muscles, joints [Straight] : straight [+2 Patella DTR] : +2 patella DTR [Cranial Nerves Grossly Intact] : cranial nerves grossly intact [No Rash or Lesions] : no rash or lesions

## 2024-10-01 ENCOUNTER — RESULT REVIEW (OUTPATIENT)
Age: 89
End: 2024-10-01

## 2024-10-01 ENCOUNTER — OUTPATIENT (OUTPATIENT)
Dept: OUTPATIENT SERVICES | Facility: HOSPITAL | Age: 89
LOS: 1 days | End: 2024-10-01
Payer: MEDICARE

## 2024-10-01 DIAGNOSIS — I77.0 ARTERIOVENOUS FISTULA, ACQUIRED: Chronic | ICD-10-CM

## 2024-10-01 DIAGNOSIS — I50.9 HEART FAILURE, UNSPECIFIED: ICD-10-CM

## 2024-10-01 PROCEDURE — 93017 CV STRESS TEST TRACING ONLY: CPT

## 2024-10-01 PROCEDURE — 93350 STRESS TTE ONLY: CPT | Mod: 26

## 2024-10-01 PROCEDURE — 93018 CV STRESS TEST I&R ONLY: CPT

## 2024-10-01 PROCEDURE — 93016 CV STRESS TEST SUPVJ ONLY: CPT

## 2024-10-01 PROCEDURE — 93351 STRESS TTE COMPLETE: CPT

## 2024-10-08 ENCOUNTER — APPOINTMENT (OUTPATIENT)
Dept: VASCULAR SURGERY | Facility: CLINIC | Age: 89
End: 2024-10-08
Payer: MEDICARE

## 2024-10-08 VITALS
WEIGHT: 156 LBS | HEART RATE: 70 BPM | DIASTOLIC BLOOD PRESSURE: 68 MMHG | SYSTOLIC BLOOD PRESSURE: 119 MMHG | BODY MASS INDEX: 24.48 KG/M2 | HEIGHT: 67 IN

## 2024-10-08 PROCEDURE — 99213 OFFICE O/P EST LOW 20 MIN: CPT

## 2024-10-08 PROCEDURE — 93990 DOPPLER FLOW TESTING: CPT

## 2024-11-13 ENCOUNTER — APPOINTMENT (OUTPATIENT)
Dept: ENDOVASCULAR SURGERY | Facility: CLINIC | Age: 89
End: 2024-11-13
Payer: MEDICARE

## 2024-11-13 PROCEDURE — 99214 OFFICE O/P EST MOD 30 MIN: CPT

## 2024-11-13 PROCEDURE — G2211 COMPLEX E/M VISIT ADD ON: CPT

## 2025-01-31 RX ORDER — CALCIUM ACETATE 667 MG/1
667 CAPSULE ORAL 3 TIMES DAILY
Qty: 540 | Refills: 3 | Status: ACTIVE | COMMUNITY
Start: 2025-01-31 | End: 1900-01-01

## 2025-02-11 ENCOUNTER — APPOINTMENT (OUTPATIENT)
Dept: VASCULAR SURGERY | Facility: CLINIC | Age: 89
End: 2025-02-11
Payer: MEDICARE

## 2025-02-11 PROCEDURE — 93990 DOPPLER FLOW TESTING: CPT

## 2025-05-14 NOTE — ED ADULT TRIAGE NOTE - BP NONINVASIVE SYSTOLIC (MM HG)
Pt lying in bed. Assessment completed; no pain or distress reported. Call light within reach and bed lowered.     1700  VSS on RA and afebrile this shift.  Pain managed this shift. Good appetite and good UOP this shift. Repositions self independently in bed and ambulating around room. Free from injury or skin breakdown. Fall precautions maintained and call light in reach. POC updated questions answered and comments acknowledged.  Purposeful hourly rounding completed this shift.   152

## 2025-05-20 ENCOUNTER — APPOINTMENT (OUTPATIENT)
Dept: VASCULAR SURGERY | Facility: CLINIC | Age: 89
End: 2025-05-20
Payer: MEDICARE

## 2025-05-20 ENCOUNTER — NON-APPOINTMENT (OUTPATIENT)
Age: 89
End: 2025-05-20

## 2025-05-20 PROCEDURE — 93990 DOPPLER FLOW TESTING: CPT

## 2025-08-28 ENCOUNTER — APPOINTMENT (OUTPATIENT)
Dept: VASCULAR SURGERY | Facility: CLINIC | Age: 89
End: 2025-08-28
Payer: MEDICARE

## 2025-08-28 PROCEDURE — 93990 DOPPLER FLOW TESTING: CPT

## 2025-08-28 PROCEDURE — 99214 OFFICE O/P EST MOD 30 MIN: CPT
